# Patient Record
Sex: MALE | Race: WHITE | NOT HISPANIC OR LATINO | Employment: OTHER
[De-identification: names, ages, dates, MRNs, and addresses within clinical notes are randomized per-mention and may not be internally consistent; named-entity substitution may affect disease eponyms.]

---

## 2024-05-13 ENCOUNTER — OFFICE VISIT (OUTPATIENT)
Dept: MEDICAL GROUP | Facility: PHYSICIAN GROUP | Age: 69
End: 2024-05-13
Payer: MEDICARE

## 2024-05-13 VITALS
TEMPERATURE: 98.3 F | OXYGEN SATURATION: 96 % | SYSTOLIC BLOOD PRESSURE: 120 MMHG | HEIGHT: 68 IN | BODY MASS INDEX: 24.6 KG/M2 | HEART RATE: 65 BPM | DIASTOLIC BLOOD PRESSURE: 64 MMHG | WEIGHT: 162.3 LBS

## 2024-05-13 DIAGNOSIS — Z12.5 PROSTATE CANCER SCREENING: ICD-10-CM

## 2024-05-13 DIAGNOSIS — E78.5 DYSLIPIDEMIA: ICD-10-CM

## 2024-05-13 DIAGNOSIS — C85.90 NON-HODGKIN'S LYMPHOMA, UNSPECIFIED BODY REGION, UNSPECIFIED NON-HODGKIN LYMPHOMA TYPE (HCC): ICD-10-CM

## 2024-05-13 DIAGNOSIS — Z91.09 ENVIRONMENTAL ALLERGIES: ICD-10-CM

## 2024-05-13 DIAGNOSIS — H61.91 SKIN LESION OF RIGHT EAR: ICD-10-CM

## 2024-05-13 DIAGNOSIS — Z11.4 SCREENING FOR HIV (HUMAN IMMUNODEFICIENCY VIRUS): ICD-10-CM

## 2024-05-13 DIAGNOSIS — B00.2 ORAL HERPES: ICD-10-CM

## 2024-05-13 DIAGNOSIS — Z76.89 ENCOUNTER TO ESTABLISH CARE: ICD-10-CM

## 2024-05-13 DIAGNOSIS — I49.9 CARDIAC ARRHYTHMIA, UNSPECIFIED CARDIAC ARRHYTHMIA TYPE: ICD-10-CM

## 2024-05-13 DIAGNOSIS — Z87.442 HISTORY OF KIDNEY STONES: ICD-10-CM

## 2024-05-13 DIAGNOSIS — Z11.59 NEED FOR HEPATITIS C SCREENING TEST: ICD-10-CM

## 2024-05-13 PROCEDURE — 3078F DIAST BP <80 MM HG: CPT | Performed by: STUDENT IN AN ORGANIZED HEALTH CARE EDUCATION/TRAINING PROGRAM

## 2024-05-13 PROCEDURE — 99204 OFFICE O/P NEW MOD 45 MIN: CPT | Performed by: STUDENT IN AN ORGANIZED HEALTH CARE EDUCATION/TRAINING PROGRAM

## 2024-05-13 PROCEDURE — 3074F SYST BP LT 130 MM HG: CPT | Performed by: STUDENT IN AN ORGANIZED HEALTH CARE EDUCATION/TRAINING PROGRAM

## 2024-05-13 RX ORDER — ZOLPIDEM TARTRATE 10 MG/1
TABLET ORAL
COMMUNITY
Start: 2024-02-21 | End: 2024-05-13

## 2024-05-13 RX ORDER — ATORVASTATIN CALCIUM 10 MG/1
TABLET, FILM COATED ORAL
COMMUNITY
Start: 2024-03-18

## 2024-05-13 RX ORDER — ACYCLOVIR 800 MG/1
800 TABLET ORAL 2 TIMES DAILY
COMMUNITY

## 2024-05-13 RX ORDER — SCOLOPAMINE TRANSDERMAL SYSTEM 1 MG/1
PATCH, EXTENDED RELEASE TRANSDERMAL
COMMUNITY
Start: 2024-02-21

## 2024-05-13 RX ORDER — AZELASTINE HYDROCHLORIDE 137 UG/1
SPRAY, METERED NASAL
COMMUNITY
Start: 2024-03-04 | End: 2024-05-15 | Stop reason: SDUPTHER

## 2024-05-13 RX ORDER — MONTELUKAST SODIUM 10 MG/1
TABLET ORAL
COMMUNITY
Start: 2024-03-18

## 2024-05-13 ASSESSMENT — ENCOUNTER SYMPTOMS
NAUSEA: 0
DIZZINESS: 0
CHILLS: 0
COUGH: 0
HALLUCINATIONS: 0
WHEEZING: 0
SEIZURES: 0
BLOOD IN STOOL: 0
BRUISES/BLEEDS EASILY: 0
PALPITATIONS: 0
BLURRED VISION: 0
DOUBLE VISION: 0
ABDOMINAL PAIN: 0
HEMOPTYSIS: 0
SPUTUM PRODUCTION: 0
ORTHOPNEA: 0
HEADACHES: 0
DIARRHEA: 0
SHORTNESS OF BREATH: 0
FLANK PAIN: 0
SPEECH CHANGE: 0
DIAPHORESIS: 0
SENSORY CHANGE: 0
POLYDIPSIA: 0
DEPRESSION: 0
EYE DISCHARGE: 0
VOMITING: 0
CLAUDICATION: 0
TINGLING: 0
FALLS: 0
HEARTBURN: 0
MYALGIAS: 0
LOSS OF CONSCIOUSNESS: 0
WEIGHT LOSS: 0
WEAKNESS: 0
CONSTIPATION: 0
FOCAL WEAKNESS: 0
PND: 0
FEVER: 0
EYE PAIN: 0

## 2024-05-13 ASSESSMENT — PATIENT HEALTH QUESTIONNAIRE - PHQ9: CLINICAL INTERPRETATION OF PHQ2 SCORE: 0

## 2024-05-13 ASSESSMENT — LIFESTYLE VARIABLES: SUBSTANCE_ABUSE: 0

## 2024-05-13 NOTE — PATIENT INSTRUCTIONS
-Start taking baby aspirin; if you have any bleeding including black stools or blood stools, let me know.  Do labs (fasting) 1-2 weeks before next visit.   Placed referral to dermatology and oncology.  Get heart ultrasound done.

## 2024-05-13 NOTE — PROGRESS NOTES
CC:  Diagnoses of Non-Hodgkin's lymphoma, unspecified body region, unspecified non-Hodgkin lymphoma type (HCC), Cardiac arrhythmia, unspecified cardiac arrhythmia type, Prostate cancer screening, Dyslipidemia, Need for hepatitis C screening test, Screening for HIV (human immunodeficiency virus), History of kidney stones, Oral herpes, Environmental allergies, Skin lesion of right ear, and Encounter to establish care were pertinent to this visit.    HISTORY OF THE PRESENT ILLNESS: Patient is a 68 y.o. male. This pleasant patient is here today to establish care and discuss problems listed below.    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     Environmental Allergies    On singular as prevention, borderline asthma on pulmonary function      Skin Lesion of Right Ear    Had a precancerous lesion in R ear, s/p removal, pt request regular skin check by dermatology     Cardiac Arrhythmia    Had it twice over 4 years, just weird feeling of palpitation.  Noted twice on apple watch, concerning for afib.  Stress test was negative  before 4 years, due to tightness in chest.     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  -allegies, borderline asthma on pulmonary function   -s/p left hip arthroplasty, hand surgeries, appendectomy, lithotripsy    No family history of cancer  No history of cad or stroke  No melena or hematochezia    Health Maintenance: Completed    Live with . Was  to a woman before.  Moved from [place] to [place]. Has family here.  Received education in psychology and education. Worked in tech.  Likes to walk and hike.  Father had heart attack  age 56.    No past medical history on file.    Current Outpatient Medications   Medication Sig Dispense Refill    atorvastatin (LIPITOR) 10 MG Tab TAKE 1 TABLET BY MOUTH NIGHTLY      Azelastine (ASTELIN) 137 MCG/SPRAY Solution SPRAY TWO PUFFS INTO EACH NOSTRIL TWICE A DAY AS DIRECTED      montelukast (SINGULAIR) 10 MG Tab TAKE ONE TABLET BY MOUTH NIGHTLY      scopolamine (TRANSDERM-SCOP) 1 mg/72hr PATCH 72 HR APPLY ONE PATCH TO THE SKIN EVERY 72 HOURS ONTO THE SKIN. APPLY BEHIND THE EAR AT LEAST 4 HOURS PRIOR TO REQUIRED ANTIEMTIC EFFECT      acyclovir (ZOVIRAX) 800 MG Tab Take 800 mg by mouth 2 times a day.       No current facility-administered medications for this visit.       Allergies as of 05/13/2024    (Not on File)       Social History     Socioeconomic History    Marital status:      Spouse name: Not on file    Number of children: Not on file    Years of education: Not on file    Highest education level: Not on file   Occupational History    Not on file   Tobacco Use    Smoking status: Never    Smokeless tobacco: Never   Vaping Use    Vaping Use: Never used   Substance and Sexual Activity    Alcohol use: Yes     Comment: ocassional    Drug use: Never    Sexual activity: Not on file   Other Topics Concern    Not on file   Social History Narrative    Not on file     Social Determinants of Health     Financial Resource Strain: Not on file   Food Insecurity: Not on file   Transportation Needs: Not on file   Physical Activity: Not on file   Stress: Not on file   Social Connections: Not on file   Intimate Partner Violence: Not on file   Housing Stability: Not on file       Family History   Problem Relation Age of Onset    Heart Disease Father        No past surgical history on file.    ROS:   Review of Systems   Constitutional:  Negative for chills, diaphoresis, fever and weight loss.   HENT:  Negative for ear discharge, ear pain, hearing loss and tinnitus.    Eyes:  Negative for blurred vision, double  "vision, pain and discharge.   Respiratory:  Negative for cough, hemoptysis, sputum production, shortness of breath and wheezing.    Cardiovascular:  Negative for chest pain, palpitations, orthopnea, claudication, leg swelling and PND.   Gastrointestinal:  Negative for abdominal pain, blood in stool, constipation, diarrhea, heartburn, melena, nausea and vomiting.   Genitourinary:  Negative for dysuria, flank pain, hematuria and urgency.   Musculoskeletal:  Negative for falls and myalgias.   Skin:  Negative for itching and rash.   Neurological:  Negative for dizziness, tingling, sensory change, speech change, focal weakness, seizures, loss of consciousness, weakness and headaches.   Endo/Heme/Allergies:  Negative for polydipsia. Does not bruise/bleed easily.   Psychiatric/Behavioral:  Negative for depression, hallucinations, substance abuse and suicidal ideas.        /64 (BP Location: Left arm, Patient Position: Sitting, BP Cuff Size: Adult)   Pulse 65   Temp 36.8 °C (98.3 °F) (Temporal)   Ht 1.727 m (5' 8\")   Wt 73.6 kg (162 lb 4.8 oz)   SpO2 96%   BMI 24.68 kg/m² Body mass index is 24.68 kg/m².    Physical Exam  Constitutional:       Appearance: Normal appearance. He is not ill-appearing.   HENT:      Head: Normocephalic.      Right Ear: External ear normal.      Left Ear: External ear normal.      Nose: No rhinorrhea.      Mouth/Throat:      Pharynx: No oropharyngeal exudate.   Eyes:      General:         Right eye: No discharge.         Left eye: No discharge.      Extraocular Movements: Extraocular movements intact.   Cardiovascular:      Rate and Rhythm: Normal rate and regular rhythm.      Heart sounds: No murmur heard.  Pulmonary:      Effort: Pulmonary effort is normal. No respiratory distress.      Breath sounds: Normal breath sounds. No wheezing or rales.   Abdominal:      Palpations: Abdomen is soft.      Tenderness: There is no abdominal tenderness. There is no guarding.   Musculoskeletal:    "      General: No swelling or tenderness. Normal range of motion.      Cervical back: Normal range of motion and neck supple. No tenderness.      Right lower leg: No edema.      Left lower leg: No edema.   Skin:     Capillary Refill: Capillary refill takes less than 2 seconds.   Neurological:      General: No focal deficit present.      Mental Status: He is alert and oriented to person, place, and time.      Sensory: No sensory deficit.      Motor: No weakness.   Psychiatric:         Mood and Affect: Mood normal.         Behavior: Behavior normal.         Thought Content: Thought content normal.         Judgment: Judgment normal.             Note: I have reviewed all pertinent labs and diagnostic tests associated with this visit with specific comments listed under the assessment and plan below    Assessment and Plan  68 y.o. male with the following -  Established care with me. No acute issues. Hiv and hepc were ordered as discussed with patient. PSA ordered per patient preference.  Had EKG in clinic today and ordered echocardiogram, given history of arrhythmia    Rate: 61  Rhythm: sinus  Axis: normal to slightly left axis  WI interval: 176  QRS complex: 82  QT interval: 397  QTC interval:400  ST segment: No ST elevation or depression  T waves: T wave inversion in v1  Impression: unremarkable EKG without acute finding.  Plan:  proceed with echocardiogram.    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     Environmental Allergies    On singular as prevention, borderline asthma on pulmonary function      Skin Lesion of Right Ear    Had a precancerous lesion in R ear, s/p removal, pt request regular skin check by dermatology     Cardiac Arrhythmia    Had it twice over 4 years, just weird feeling of palpitation.  Noted twice  on apple watch, concerning for afib.  Stress test was negative  before 4 years, due to tightness in chest.     Dyslipidemia        1. Non-Hodgkin's lymphoma, unspecified body region, unspecified non-Hodgkin lymphoma type (HCC)  Referral to Hematology Oncology      2. Cardiac arrhythmia, unspecified cardiac arrhythmia type  EKG - Clinic Performed    EC-ECHOCARDIOGRAM COMPLETE W/O CONT    TSH WITH REFLEX TO FT4      3. Prostate cancer screening  PROSTATE SPECIFIC AG SCREENING      4. Dyslipidemia  CBC WITHOUT DIFFERENTIAL    Comp Metabolic Panel    Lipid Profile    TSH WITH REFLEX TO FT4      5. Need for hepatitis C screening test  HEP C VIRUS ANTIBODY      6. Screening for HIV (human immunodeficiency virus)  HIV AG/AB COMBO ASSAY SCREENING      7. History of kidney stones        8. Oral herpes        9. Environmental allergies        10. Skin lesion of right ear  Referral to Dermatology      11. Encounter to establish care             I spent a total of 29 minutes with record review, exam, communication with the patient, communication with other providers, and documentation of this encounter.    Return in about 4 weeks (around 6/10/2024) for wellness visit.    Please note that this dictation was created using voice recognition software. I have made every reasonable attempt to correct obvious errors, but I expect that there are errors of grammar and possibly content that I did not discover before finalizing the note.

## 2024-05-15 ENCOUNTER — PATIENT MESSAGE (OUTPATIENT)
Dept: MEDICAL GROUP | Facility: PHYSICIAN GROUP | Age: 69
End: 2024-05-15

## 2024-05-15 ENCOUNTER — HOSPITAL ENCOUNTER (OUTPATIENT)
Dept: CARDIOLOGY | Facility: MEDICAL CENTER | Age: 69
End: 2024-05-15
Attending: STUDENT IN AN ORGANIZED HEALTH CARE EDUCATION/TRAINING PROGRAM
Payer: MEDICARE

## 2024-05-15 DIAGNOSIS — I49.9 CARDIAC ARRHYTHMIA, UNSPECIFIED CARDIAC ARRHYTHMIA TYPE: ICD-10-CM

## 2024-05-15 LAB
LV EJECT FRACT  99904: 72
LV EJECT FRACT MOD 2C 99903: 71.81
LV EJECT FRACT MOD 4C 99902: 74.12
LV EJECT FRACT MOD BP 99901: 72.42

## 2024-05-15 PROCEDURE — 93306 TTE W/DOPPLER COMPLETE: CPT | Mod: 26 | Performed by: INTERNAL MEDICINE

## 2024-05-15 RX ORDER — AZELASTINE HYDROCHLORIDE 137 UG/1
SPRAY, METERED NASAL
Qty: 90 ML | Refills: 2 | Status: SHIPPED | OUTPATIENT
Start: 2024-05-15

## 2024-05-16 ENCOUNTER — OFFICE VISIT (OUTPATIENT)
Dept: DERMATOLOGY | Facility: IMAGING CENTER | Age: 69
End: 2024-05-16
Payer: MEDICARE

## 2024-05-16 DIAGNOSIS — L57.0 ACTINIC KERATOSIS: ICD-10-CM

## 2024-05-16 DIAGNOSIS — C85.90 NON-HODGKIN'S LYMPHOMA, UNSPECIFIED BODY REGION, UNSPECIFIED NON-HODGKIN LYMPHOMA TYPE (HCC): ICD-10-CM

## 2024-05-16 DIAGNOSIS — Z12.83 SKIN CANCER SCREENING: ICD-10-CM

## 2024-05-16 DIAGNOSIS — L90.8 SKIN AGING: ICD-10-CM

## 2024-05-16 DIAGNOSIS — L81.4 LENTIGO: ICD-10-CM

## 2024-05-16 DIAGNOSIS — L82.1 SEBORRHEIC KERATOSIS: ICD-10-CM

## 2024-05-16 DIAGNOSIS — D22.9 NEVUS: ICD-10-CM

## 2024-05-16 DIAGNOSIS — L82.0 INFLAMED SEBORRHEIC KERATOSIS: ICD-10-CM

## 2024-05-16 PROBLEM — I35.8 MILD AORTIC VALVE SCLEROSIS: Status: ACTIVE | Noted: 2024-05-16

## 2024-05-16 PROCEDURE — 17110 DESTRUCTION B9 LES UP TO 14: CPT | Performed by: DERMATOLOGY

## 2024-05-16 PROCEDURE — 99203 OFFICE O/P NEW LOW 30 MIN: CPT | Mod: 25 | Performed by: DERMATOLOGY

## 2024-05-16 PROCEDURE — 17000 DESTRUCT PREMALG LESION: CPT | Mod: 59 | Performed by: DERMATOLOGY

## 2024-05-16 NOTE — PROGRESS NOTES
"CC: Establish Care (ASHLEY)     Subjective: New patient here for ASHLEY.     HPI: Patient presents today for evaluation of multiple skin lesions to check for skin cancer.    History of skin cancer: No  History of precancers/actinic keratoses: Yes, Details: rt ear. Put some\"ointment on it for a while\" ( efudex maybe)   History of biopsies:Yes, Details: benign   History of blistering/severe sunburns:No  Family history of skin cancer:No  Family history of atypical moles:No    ROS: no fevers/chills. No itch.  No cough  Relevant PMH:NHL  Social: NS    PE: Gen:WDWN male in NAD. Skin: Scalp/face/eyes/lips/neck/chest/back/arms/legs/hands/feet/buttocks - without suspicious lesions noted.  Genitals exam declined  -thin flaking on pink base on right helix  -few thin waxy papules on right temple, right nares and right arm, appearing minimally irritated and benign  -scattered hyperpigmented macules/papules, appearing benign on torso and extremities    A/P: Nevi: benign appearing:  -Reviewed ABCDEs  -Reviewed skin cancer detection/prevention  -RTC PRN growth/changes/concerning features    Lentigos/SKs: benign  -reassurance  -reviewed skin cancer detection/prevention    ISK:    -LN2 25 sec X 2 cycles  X3 lesions  -f/u if persists prn    AKs: right ear  -counseled on diagnosis and treatment, including risks/benefits/alternatives of cyrotherapy  -LN2 25 sec X 2 cycles X 1lesions  -f/u if persists at 1 month      I have reviewed medications relevant to my specialty.          "

## 2024-05-30 ENCOUNTER — HOSPITAL ENCOUNTER (OUTPATIENT)
Dept: LAB | Facility: MEDICAL CENTER | Age: 69
End: 2024-05-30
Attending: STUDENT IN AN ORGANIZED HEALTH CARE EDUCATION/TRAINING PROGRAM
Payer: MEDICARE

## 2024-05-30 DIAGNOSIS — I49.9 CARDIAC ARRHYTHMIA, UNSPECIFIED CARDIAC ARRHYTHMIA TYPE: ICD-10-CM

## 2024-05-30 DIAGNOSIS — E78.5 DYSLIPIDEMIA: ICD-10-CM

## 2024-05-30 DIAGNOSIS — Z11.4 SCREENING FOR HIV (HUMAN IMMUNODEFICIENCY VIRUS): ICD-10-CM

## 2024-05-30 DIAGNOSIS — Z11.59 NEED FOR HEPATITIS C SCREENING TEST: ICD-10-CM

## 2024-05-30 DIAGNOSIS — Z12.5 PROSTATE CANCER SCREENING: ICD-10-CM

## 2024-05-30 LAB
ALBUMIN SERPL BCP-MCNC: 4.1 G/DL (ref 3.2–4.9)
ALBUMIN/GLOB SERPL: 1.4 G/DL
ALP SERPL-CCNC: 106 U/L (ref 30–99)
ALT SERPL-CCNC: 8 U/L (ref 2–50)
ANION GAP SERPL CALC-SCNC: 11 MMOL/L (ref 7–16)
AST SERPL-CCNC: 11 U/L (ref 12–45)
BILIRUB SERPL-MCNC: 0.6 MG/DL (ref 0.1–1.5)
BUN SERPL-MCNC: 14 MG/DL (ref 8–22)
CALCIUM ALBUM COR SERPL-MCNC: 9.4 MG/DL (ref 8.5–10.5)
CALCIUM SERPL-MCNC: 9.5 MG/DL (ref 8.5–10.5)
CHLORIDE SERPL-SCNC: 104 MMOL/L (ref 96–112)
CHOLEST SERPL-MCNC: 85 MG/DL (ref 100–199)
CO2 SERPL-SCNC: 26 MMOL/L (ref 20–33)
CREAT SERPL-MCNC: 0.85 MG/DL (ref 0.5–1.4)
ERYTHROCYTE [DISTWIDTH] IN BLOOD BY AUTOMATED COUNT: 41.3 FL (ref 35.9–50)
GFR SERPLBLD CREATININE-BSD FMLA CKD-EPI: 93 ML/MIN/1.73 M 2
GLOBULIN SER CALC-MCNC: 3 G/DL (ref 1.9–3.5)
GLUCOSE SERPL-MCNC: 95 MG/DL (ref 65–99)
HCT VFR BLD AUTO: 41.6 % (ref 42–52)
HCV AB SER QL: NORMAL
HDLC SERPL-MCNC: 39 MG/DL
HGB BLD-MCNC: 13.7 G/DL (ref 14–18)
HIV 1+2 AB+HIV1 P24 AG SERPL QL IA: NORMAL
LDLC SERPL CALC-MCNC: 36 MG/DL
MCH RBC QN AUTO: 30.6 PG (ref 27–33)
MCHC RBC AUTO-ENTMCNC: 32.9 G/DL (ref 32.3–36.5)
MCV RBC AUTO: 93.1 FL (ref 81.4–97.8)
PLATELET # BLD AUTO: 268 K/UL (ref 164–446)
PMV BLD AUTO: 8.9 FL (ref 9–12.9)
POTASSIUM SERPL-SCNC: 4.3 MMOL/L (ref 3.6–5.5)
PROT SERPL-MCNC: 7.1 G/DL (ref 6–8.2)
PSA SERPL-MCNC: 1.92 NG/ML (ref 0–4)
RBC # BLD AUTO: 4.47 M/UL (ref 4.7–6.1)
SODIUM SERPL-SCNC: 141 MMOL/L (ref 135–145)
TRIGL SERPL-MCNC: 50 MG/DL (ref 0–149)
TSH SERPL DL<=0.005 MIU/L-ACNC: 4.18 UIU/ML (ref 0.38–5.33)
WBC # BLD AUTO: 10.6 K/UL (ref 4.8–10.8)

## 2024-06-04 ENCOUNTER — TELEPHONE (OUTPATIENT)
Dept: HEALTH INFORMATION MANAGEMENT | Facility: OTHER | Age: 69
End: 2024-06-04
Payer: MEDICARE

## 2024-06-05 ENCOUNTER — HOSPITAL ENCOUNTER (OUTPATIENT)
Dept: HEMATOLOGY ONCOLOGY | Facility: MEDICAL CENTER | Age: 69
End: 2024-06-05
Attending: STUDENT IN AN ORGANIZED HEALTH CARE EDUCATION/TRAINING PROGRAM
Payer: MEDICARE

## 2024-06-05 VITALS
OXYGEN SATURATION: 98 % | TEMPERATURE: 97.2 F | HEART RATE: 68 BPM | HEIGHT: 68 IN | BODY MASS INDEX: 24.52 KG/M2 | RESPIRATION RATE: 16 BRPM | DIASTOLIC BLOOD PRESSURE: 58 MMHG | WEIGHT: 161.8 LBS | SYSTOLIC BLOOD PRESSURE: 104 MMHG

## 2024-06-05 DIAGNOSIS — C82.01 GRADE 1 FOLLICULAR LYMPHOMA OF LYMPH NODES OF NECK (HCC): ICD-10-CM

## 2024-06-05 PROCEDURE — 99212 OFFICE O/P EST SF 10 MIN: CPT | Performed by: STUDENT IN AN ORGANIZED HEALTH CARE EDUCATION/TRAINING PROGRAM

## 2024-06-05 PROCEDURE — 99203 OFFICE O/P NEW LOW 30 MIN: CPT | Performed by: STUDENT IN AN ORGANIZED HEALTH CARE EDUCATION/TRAINING PROGRAM

## 2024-06-05 ASSESSMENT — ENCOUNTER SYMPTOMS
INSOMNIA: 0
SHORTNESS OF BREATH: 0
NAUSEA: 0
CHILLS: 0
COUGH: 0
CONSTIPATION: 0
DIZZINESS: 0
VOMITING: 0
DOUBLE VISION: 0
WEAKNESS: 0
MYALGIAS: 0
DIAPHORESIS: 0
BRUISES/BLEEDS EASILY: 0
BLOOD IN STOOL: 0
BACK PAIN: 0
SINUS PAIN: 0
SORE THROAT: 0
HEARTBURN: 0
ABDOMINAL PAIN: 0
HEADACHES: 0
SPUTUM PRODUCTION: 0
ORTHOPNEA: 0
PALPITATIONS: 0
DIARRHEA: 0
WHEEZING: 0
WEIGHT LOSS: 0
FEVER: 0
TINGLING: 0
BLURRED VISION: 0
NERVOUS/ANXIOUS: 0

## 2024-06-05 ASSESSMENT — PAIN SCALES - GENERAL: PAINLEVEL: NO PAIN

## 2024-06-05 ASSESSMENT — FIBROSIS 4 INDEX: FIB4 SCORE: 1.02

## 2024-06-05 NOTE — ADDENDUM NOTE
Encounter addended by: Alexa Gonzales, Med Ass't on: 6/5/2024 10:31 AM   Actions taken: Charge Capture section accepted

## 2024-06-05 NOTE — PROGRESS NOTES
Consult:  Hematology/Oncology      Referring Physician: PCP  Primary Care:  Carlos Charles M.D.    Diagnosis: Follicular lymphoma    Chief Complaint:  Transfer of care, surveillance    History of Presenting Illness:  Jet Webb is a 68 y.o.  man who presents to the clinic for transfer of care for ongoing management for follicular lymphoma.  The patient was first diagnosed 5 and half years ago when he noticed a lymph node in his supraclavicular area on the right side, which has been enlarged for some time.  This was biopsied and found to be follicular lymphoma and he had continued follow-up with oncology in [place].  The patient never had any problems with B symptoms or endorgan damage, and was maintained on surveillance.  He has done well during this period of time and has recently retired and moved from [place] to [place].    Interval History:  Patient is here for consultation.  He states that he feels well and has no new complaints.    Past Medical History:   Diagnosis Date    Cancer (HCC)     Lymphoma (HCC)        Past Surgical History:   Procedure Laterality Date    APPENDECTOMY      ARTHROPLASTY         Social History     Socioeconomic History    Marital status:      Spouse name: Not on file    Number of children: Not on file    Years of education: Not on file    Highest education level: Not on file   Occupational History    Not on file   Tobacco Use    Smoking status: Never    Smokeless tobacco: Never   Vaping Use    Vaping status: Never Used   Substance and Sexual Activity    Alcohol use: Yes     Comment: ocassional    Drug use: Never    Sexual activity: Not on file   Other Topics Concern    Not on file   Social History Narrative     to his , recently moved from [place]. 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      Social Determinants of Health     Financial Resource Strain: Low Risk  (12/1/2023)    Received from [facility]    Overall Financial  Resource Strain (CARDIA)     Difficulty of Paying Living Expenses: Not hard at all   Food Insecurity: No Food Insecurity (12/1/2023)    Received from [facility]    Hunger Vital Sign     Worried About Running Out of Food in the Last Year: Never true     Ran Out of Food in the Last Year: Never true   Transportation Needs: No Transportation Needs (12/1/2023)    Received from [facility]    PRAPARE - Transportation     Lack of Transportation (Medical): No     Lack of Transportation (Non-Medical): No   Physical Activity: Sufficiently Active (12/1/2023)    Received from [facility]    Exercise Vital Sign     Days of Exercise per Week: 7 days     Minutes of Exercise per Session: 30 min   Stress: No Stress Concern Present (12/1/2023)    Received from [facility]    [place] [employer] - Occupational Stress Questionnaire     Feeling of Stress : Not at all   Social Connections: Moderately Integrated (12/1/2023)    Received from [facility]    Social Connection and Isolation Panel [NHANES]     Frequency of Communication with Friends and Family: More than three times a week     Frequency of Social Gatherings with Friends and Family: Three times a week     Attends Rastafarian Services: Never     Active Member of Clubs or Organizations: Yes     Attends Club or Organization Meetings: More than 4 times per year     Marital Status:    Intimate Partner Violence: Not At Risk (12/1/2023)    Received from [facility]    Humiliation, Afraid, Rape, and Kick questionnaire     Fear of Current or Ex-Partner: No     Emotionally Abused: No     Physically Abused: No     Sexually Abused: No   Housing Stability: Low Risk  (12/1/2023)    Received from [facility]    Housing Stability Vital Sign     Unable to Pay for Housing in the Last Year: No     Number of Places Lived in the Last Year: 1     Unstable Housing in the Last Year: No       Family History   Problem Relation Age of Onset    Heart Disease Father     Cancer  "Neg Hx        OB History   No obstetric history on file.       Allergies as of 06/05/2024    (No Known Allergies)         Current Outpatient Medications:     Azelastine (ASTELIN) 137 MCG/SPRAY Solution, SPRAY TWO PUFFS INTO EACH NOSTRIL TWICE A DAY AS DIRECTED, Disp: 90 mL, Rfl: 2    atorvastatin (LIPITOR) 10 MG Tab, TAKE 1 TABLET BY MOUTH NIGHTLY, Disp: , Rfl:     montelukast (SINGULAIR) 10 MG Tab, TAKE ONE TABLET BY MOUTH NIGHTLY, Disp: , Rfl:     scopolamine (TRANSDERM-SCOP) 1 mg/72hr PATCH 72 HR, APPLY ONE PATCH TO THE SKIN EVERY 72 HOURS ONTO THE SKIN. APPLY BEHIND THE EAR AT LEAST 4 HOURS PRIOR TO REQUIRED ANTIEMTIC EFFECT, Disp: , Rfl:     acyclovir (ZOVIRAX) 800 MG Tab, Take 800 mg by mouth 2 times a day., Disp: , Rfl:     Review of Systems:  Review of Systems   Constitutional:  Negative for chills, diaphoresis, fever, malaise/fatigue and weight loss.   HENT:  Negative for hearing loss, nosebleeds, sinus pain and sore throat.    Eyes:  Negative for blurred vision and double vision.   Respiratory:  Negative for cough, sputum production, shortness of breath and wheezing.    Cardiovascular:  Negative for chest pain, palpitations, orthopnea and leg swelling.   Gastrointestinal:  Negative for abdominal pain, blood in stool, constipation, diarrhea, heartburn, melena, nausea and vomiting.   Genitourinary:  Negative for dysuria, frequency, hematuria and urgency.   Musculoskeletal:  Negative for back pain, joint pain and myalgias.   Skin:  Negative for rash.   Neurological:  Negative for dizziness, tingling, weakness and headaches.   Endo/Heme/Allergies:  Does not bruise/bleed easily.   Psychiatric/Behavioral:  The patient is not nervous/anxious and does not have insomnia.           Physical Exam:  Vitals:    06/05/24 0833   BP: 104/58   Pulse: 68   Resp: 16   Temp: 36.2 °C (97.2 °F)   TempSrc: Temporal   SpO2: 98%   Weight: 73.4 kg (161 lb 12.8 oz)   Height: 1.727 m (5' 7.99\")       DESC; KARNOFSKY SCALE WITH ECOG " EQUIVALENT: 100, Fully active, able to carry on all pre-disease performed without restriction (ECOG equivalent 0)    DISTRESS LEVEL: no apparent distress    Physical Exam  Vitals and nursing note reviewed.   Constitutional:       General: He is awake. He is not in acute distress.     Appearance: Normal appearance. He is normal weight. He is not ill-appearing, toxic-appearing or diaphoretic.   HENT:      Head: Normocephalic and atraumatic.      Nose: Nose normal. No congestion.      Mouth/Throat:      Pharynx: Oropharynx is clear. No oropharyngeal exudate or posterior oropharyngeal erythema.   Eyes:      General: No scleral icterus.     Extraocular Movements: Extraocular movements intact.      Conjunctiva/sclera: Conjunctivae normal.      Pupils: Pupils are equal, round, and reactive to light.   Cardiovascular:      Rate and Rhythm: Normal rate and regular rhythm.      Pulses: Normal pulses.      Heart sounds: Normal heart sounds. No murmur heard.     No friction rub. No gallop.   Pulmonary:      Effort: Pulmonary effort is normal.      Breath sounds: Normal breath sounds. No decreased air movement. No wheezing, rhonchi or rales.   Abdominal:      General: Bowel sounds are normal. There is no distension.      Tenderness: There is no abdominal tenderness.   Musculoskeletal:         General: No deformity. Normal range of motion.      Cervical back: Normal range of motion and neck supple. No tenderness.      Right lower leg: No edema.      Left lower leg: No edema.   Lymphadenopathy:      Cervical: Cervical adenopathy present.      Left cervical: Superficial cervical adenopathy present.      Upper Body:      Right upper body: No axillary adenopathy.      Left upper body: No axillary adenopathy.      Lower Body: No right inguinal adenopathy. No left inguinal adenopathy.   Skin:     General: Skin is warm and dry.      Coloration: Skin is not jaundiced.      Findings: No erythema or rash.   Neurological:      General: No  focal deficit present.      Mental Status: He is alert and oriented to person, place, and time.      Sensory: Sensation is intact.      Motor: Motor function is intact. No weakness.      Gait: Gait is intact.   Psychiatric:         Attention and Perception: Attention normal.         Mood and Affect: Mood normal.         Behavior: Behavior normal. Behavior is cooperative.         Thought Content: Thought content normal.         Judgment: Judgment normal.          Depression Screening    Little interest or pleasure in doing things?      Feeling down, depressed , or hopeless?     Trouble falling or staying asleep, or sleeping too much?      Feeling tired or having little energy?      Poor appetite or overeating?      Feeling bad about yourself - or that you are a failure or have let yourself or your family down?     Trouble concentrating on things, such as reading the newspaper or watching television?     Moving or speaking so slowly that other people could have noticed.  Or the opposite - being so fidgety or restless that you have been moving around a lot more than usual?      Thoughts that you would be better off dead, or of hurting yourself?      Patient Health Questionnaire Score:         If depressive symptoms identified deferred to follow up visit unless specifically addressed in assesment and plan.    Interpretation of PHQ-9 Total Score   Score Severity   1-4 No Depression   5-9 Mild Depression   10-14 Moderate Depression   15-19 Moderately Severe Depression   20-27 Severe Depression    Labs:  Hospital Outpatient Visit on 05/30/2024   Component Date Value Ref Range Status    Prostatic Specific Antigen Tot 05/30/2024 1.92  0.00 - 4.00 ng/mL Final    Comment: Performed using Roche judson e immunoassay analyzer. tPSA values determined  on patient samples by different testing procedures cannot be directly  compared with one another and could be the cause of erroneous medical  interpretations. If there is a change in  the tPSA assay procedure used while  monitoring therapy, then new baselines may need to be established when  comparing previous results.      TSH 05/30/2024 4.180  0.380 - 5.330 uIU/mL Final    Comment: The 2011 American Thyroid Association (IONA) guidelines  recommended that the interpretation of thyroid function in  pregnancy be based on trimester specific reference ranges.    1st Trimester  0.100-2.500 mIU/L  2nd Trimester  0.200-3.000 mIU/L  3rd Trimester  0.300-3.500 mIU/L    These established reference ranges have not been validated  at [facility].      Cholesterol,Tot 05/30/2024 85 (L)  100 - 199 mg/dL Final    Triglycerides 05/30/2024 50  0 - 149 mg/dL Final    HDL 05/30/2024 39 (A)  >=40 mg/dL Final    LDL 05/30/2024 36  <100 mg/dL Final    Sodium 05/30/2024 141  135 - 145 mmol/L Final    Potassium 05/30/2024 4.3  3.6 - 5.5 mmol/L Final    Chloride 05/30/2024 104  96 - 112 mmol/L Final    Co2 05/30/2024 26  20 - 33 mmol/L Final    Anion Gap 05/30/2024 11.0  7.0 - 16.0 Final    Glucose 05/30/2024 95  65 - 99 mg/dL Final    Bun 05/30/2024 14  8 - 22 mg/dL Final    Creatinine 05/30/2024 0.85  0.50 - 1.40 mg/dL Final    Calcium 05/30/2024 9.5  8.5 - 10.5 mg/dL Final    Correct Calcium 05/30/2024 9.4  8.5 - 10.5 mg/dL Final    AST(SGOT) 05/30/2024 11 (L)  12 - 45 U/L Final    ALT(SGPT) 05/30/2024 8  2 - 50 U/L Final    Alkaline Phosphatase 05/30/2024 106 (H)  30 - 99 U/L Final    Total Bilirubin 05/30/2024 0.6  0.1 - 1.5 mg/dL Final    Albumin 05/30/2024 4.1  3.2 - 4.9 g/dL Final    Total Protein 05/30/2024 7.1  6.0 - 8.2 g/dL Final    Globulin 05/30/2024 3.0  1.9 - 3.5 g/dL Final    A-G Ratio 05/30/2024 1.4  g/dL Final    WBC 05/30/2024 10.6  4.8 - 10.8 K/uL Final    RBC 05/30/2024 4.47 (L)  4.70 - 6.10 M/uL Final    Hemoglobin 05/30/2024 13.7 (L)  14.0 - 18.0 g/dL Final    Hematocrit 05/30/2024 41.6 (L)  42.0 - 52.0 % Final    MCV 05/30/2024 93.1  81.4 - 97.8 fL Final    MCH 05/30/2024 30.6   27.0 - 33.0 pg Final    MCHC 05/30/2024 32.9  32.3 - 36.5 g/dL Final    RDW 05/30/2024 41.3  35.9 - 50.0 fL Final    Platelet Count 05/30/2024 268  164 - 446 K/uL Final    MPV 05/30/2024 8.9 (L)  9.0 - 12.9 fL Final    Hepatitis C Antibody 05/30/2024 Non-Reactive  Non-Reactive Final    Comment: Antibodies to HCV were not detected.  The Roche anti-HCV is a diagnostic test for the qualitative determination of  antibodies to the hepatitis C virus in human serum and plasma. The results  should be used and interpreted only in the context of the overall clinical  picture. A negative test result does not exclude the possibility of exposure  to hepatitis C virus.  Note: Assay performance characteristics have not been established in  populations of immunocompromised or immunosuppressed patients.      HIV Ag/Ab Combo Assay 05/30/2024 Non-Reactive  Non Reactive Final    Comment: Roche HIV is a diagnostic test for the qualitative determination of HIV-1  p24 antigen and antibodies to HIV-1 (HIV-1 groups M and O) and HIV-2 in  human serum and plasma. A negative screen does not preclude the possibility  of exposure or infection. Consider retesting in high-risk patients, if  clinically indicated.      GFR (CKD-EPI) 05/30/2024 93  >60 mL/min/1.73 m 2 Final    Comment: Estimated Glomerular Filtration Rate is calculated using  race neutral CKD-EPI 2021 equation per NKF-ASN recommendations.         Imaging:   All listed images below have been independently reviewed by me. I agree with the findings as summarized below:    EC-ECHOCARDIOGRAM COMPLETE W/O CONT    Result Date: 5/15/2024  Transthoracic Echo Report Echocardiography Laboratory CONCLUSIONS Normal left ventricular size, wall thickness, systolic, and diastolic function. The right ventricle is mildly dilated with preserved systolic function. Mildly dilated left atrium. Mild aortic insufficiency. No pericardial effusion. No prior study is available for comparison. NORBERTO NARANJO  Exam Date:         05/15/2024                    15:06 Exam Location:     Out Patient Priority:          Routine Ordering Physician:        CELIO SWANSON Referring Physician:       [id number] Mayo Sonographer:               Samson Calvin RDCS, RVT Age:    68     Gender:    M MRN:    [id number] :    1955 BSA:    1.87   Ht (in):    68     Wt (lb):    162 Exam Type:     Complete Indications:     Other specified cardiac arrhythmias ICD Codes:       I49.8 CPT Codes:       05502 BP:   120    /   64     HR:   66 Technical Quality:       Fair MEASUREMENTS  (Male / Female) Normal Values 2D ECHO LV Diastolic Diameter PLAX        4.5 cm                4.2 - 5.9 / 3.9 - 5.3 cm LV Systolic Diameter PLAX         2 cm                  2.1 - 4.0 cm IVS Diastolic Thickness           1.4 cm                LVPW Diastolic Thickness          0.88 cm               LVOT Diameter                     2.1 cm                Estimated LV Ejection Fraction    72 %                  LV Ejection Fraction MOD BP       72.4 %                >= 55  % LV Ejection Fraction MOD 4C       74.1 %                LV Ejection Fraction MOD 2C       71.8 %                IVC Diameter                      2.3 cm                DOPPLER AV Peak Velocity                  1.4 m/s               AV Peak Gradient                  7.9 mmHg              AV Mean Gradient                  4.4 mmHg              AI Pressure Half Time             599 ms                LVOT Peak Velocity                1.2 m/s               AV Area Cont Eq vti               3.1 cm2               MV Velocity Time Integral         26.6 cm               Mitral E Point Velocity           0.64 m/s              Mitral E to A Ratio               0.84                  Mitral A Duration                 118 ms                MV Pressure Half Time             76.2 ms               MV Area PHT                       2.9 cm2               MV Deceleration Time              263  ms                Pulmonary Vein Systolic Velocity  0.74 m/s              Pulmonary Vein Diastolic Velocit  0.46 m/s              Pulmonary Vein S/D Ratio          1.6                   Pulmonary Vein A Velocity         0.37 m/s              Pulmonary Vein A Duration         125 ms                Pulm Vein-MV A Duration           6.9 ms                TR Peak Velocity                  217 cm/s              PV Peak Velocity                  1.1 m/s               PV Peak Gradient                  5 mmHg                PV Mean Gradient                  2.4 mmHg              RVOT Peak Velocity                0.68 m/s              * Indicates values subject to auto-interpretation LV EF:  72    % FINDINGS Left Ventricle Normal left ventricular chamber size. Normal left ventricular wall thickness. Normal left ventricular systolic function. The ejection fraction is measured to be 72 % by Hernandez's biplane. No regional wall motion abnormalities. Normal diastolic function. Right Ventricle The right ventricle is mildly dilated. Normal right ventricular systolic function. Right Atrium Enlarged right atrium. Dilated inferior vena cava with inspiratory collapse. Left Atrium Mildly dilated left atrium. Left atrial volume index is 35 mL/sq m. Mitral Valve Thickened mitral valve leaflets. No mitral stenosis. Trace mitral regurgitation. Aortic Valve Aortic valve sclerosis without significant stenosis. Mild aortic insufficiency. Pressure half time is 614 msec. Tricuspid Valve The tricuspid valve is not well visualized. No tricuspid stenosis. Trace tricuspid regurgitation. Right atrial pressure is estimated to be 8 mmHg. Estimated right ventricular systolic pressure is 22 mmHg. Pulmonic Valve The pulmonic valve is not well visualized. No stenosis or regurgitation seen. Pericardium No pericardial effusion. Aorta The aortic root with a diameter of 4.1 cm. The ascending aorta diameter is 3.5 cm. Rk Ayala MD (Electronically Signed)  Final Date:     15 May 2024 18:14       Pathology:  Reported follicular lymphoma    Assessment & Plan:  1. 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 He has been on surveillance for many years and presents for transfer of care and management.     Current Diagnosis and Staging: Follicular lymphoma, stage II    Treatment Plan: Surveillance    Treatment Citation: NCCN    Plan of Care:    Primary Therapy: NA  Supportive Therapy: NA  Toxicity: NA  Labs: CBC with diff, CMP, LDH monitoring  Imaging: CT scans as clinically indicated  Treatment Planning: Patient has low grade indolent follicular lymphoma. Continue with surveillance with labs and exam q 6 months.   Consultations: NA  Code Status: Full  Miscellaneous: NA  Return for Follow Up: 6 months    Any questions and concerns raised by the patient were answered to the best of my ability. Thank you for allowing me to participate in the care for this patient. Please feel free to contact me for any questions or concerns.     Total time spent on chart review, clinic encounter, and documentation: 38 minutes.

## 2024-06-12 ENCOUNTER — OFFICE VISIT (OUTPATIENT)
Dept: MEDICAL GROUP | Facility: PHYSICIAN GROUP | Age: 69
End: 2024-06-12
Payer: MEDICARE

## 2024-06-12 VITALS
HEART RATE: 69 BPM | TEMPERATURE: 98.7 F | HEIGHT: 67 IN | DIASTOLIC BLOOD PRESSURE: 60 MMHG | OXYGEN SATURATION: 96 % | SYSTOLIC BLOOD PRESSURE: 118 MMHG | WEIGHT: 161.1 LBS | RESPIRATION RATE: 16 BRPM | BODY MASS INDEX: 25.28 KG/M2

## 2024-06-12 DIAGNOSIS — C82.01 GRADE 1 FOLLICULAR LYMPHOMA OF LYMPH NODES OF NECK (HCC): ICD-10-CM

## 2024-06-12 DIAGNOSIS — I49.9 CARDIAC ARRHYTHMIA, UNSPECIFIED CARDIAC ARRHYTHMIA TYPE: ICD-10-CM

## 2024-06-12 DIAGNOSIS — E78.5 DYSLIPIDEMIA: ICD-10-CM

## 2024-06-12 DIAGNOSIS — R74.8 ELEVATED ALKALINE PHOSPHATASE LEVEL: ICD-10-CM

## 2024-06-12 DIAGNOSIS — Z91.09 ENVIRONMENTAL ALLERGIES: ICD-10-CM

## 2024-06-12 DIAGNOSIS — D64.9 NORMOCYTIC ANEMIA: ICD-10-CM

## 2024-06-12 DIAGNOSIS — R09.89 THROAT CLEARING: ICD-10-CM

## 2024-06-12 PROCEDURE — 3074F SYST BP LT 130 MM HG: CPT | Performed by: STUDENT IN AN ORGANIZED HEALTH CARE EDUCATION/TRAINING PROGRAM

## 2024-06-12 PROCEDURE — 99214 OFFICE O/P EST MOD 30 MIN: CPT | Performed by: STUDENT IN AN ORGANIZED HEALTH CARE EDUCATION/TRAINING PROGRAM

## 2024-06-12 PROCEDURE — 3078F DIAST BP <80 MM HG: CPT | Performed by: STUDENT IN AN ORGANIZED HEALTH CARE EDUCATION/TRAINING PROGRAM

## 2024-06-12 RX ORDER — ACYCLOVIR 800 MG/1
800 TABLET ORAL 2 TIMES DAILY
Qty: 200 TABLET | Refills: 2 | Status: SHIPPED | OUTPATIENT
Start: 2024-06-12

## 2024-06-12 RX ORDER — ATORVASTATIN CALCIUM 10 MG/1
10 TABLET, FILM COATED ORAL NIGHTLY
Qty: 100 TABLET | Refills: 2 | Status: SHIPPED | OUTPATIENT
Start: 2024-06-12

## 2024-06-12 RX ORDER — MONTELUKAST SODIUM 10 MG/1
10 TABLET ORAL DAILY
Qty: 100 TABLET | Refills: 2 | Status: SHIPPED | OUTPATIENT
Start: 2024-06-12

## 2024-06-12 RX ORDER — AZELASTINE HYDROCHLORIDE 137 UG/1
SPRAY, METERED NASAL
Qty: 90 ML | Refills: 2 | Status: SHIPPED | OUTPATIENT
Start: 2024-06-12

## 2024-06-12 ASSESSMENT — ENCOUNTER SYMPTOMS
SPEECH CHANGE: 0
WEAKNESS: 0
EYE DISCHARGE: 0
POLYDIPSIA: 0
PND: 0
DIAPHORESIS: 0
HEARTBURN: 0
ABDOMINAL PAIN: 0
HEMOPTYSIS: 0
LOSS OF CONSCIOUSNESS: 0
DIZZINESS: 0
BRUISES/BLEEDS EASILY: 0
FLANK PAIN: 0
COUGH: 0
ORTHOPNEA: 0
NAUSEA: 0
SEIZURES: 0
MYALGIAS: 0
SENSORY CHANGE: 0
CHILLS: 0
PALPITATIONS: 0
EYE PAIN: 0
WHEEZING: 0
FEVER: 0
SPUTUM PRODUCTION: 0
SHORTNESS OF BREATH: 0
VOMITING: 0
FALLS: 0
FOCAL WEAKNESS: 0

## 2024-06-12 ASSESSMENT — FIBROSIS 4 INDEX: FIB4 SCORE: 1.02

## 2024-06-12 NOTE — PROGRESS NOTES
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    HISTORY OF THE PRESENT ILLNESS: Patient is a 68 y.o. male. This pleasant patient is here today for follow up visit.    Problem   Bmi 25.0-25.9,Adult    Patient states that his weight went down unintentionally a little bit over the years so I instructed him to work on maintaining muscles by potential lifting weights or doing resistance exercises     Throat Clearing    Chronic, has been going on for more than a year.  He states that it occurs after eating meals.  He denies bitter taste or heartburn but feels mucus in throat after eating meals.  Denies feeling of food getting stuck in throat or esophagus.  This may be related to environmental allergy but I ordered a swallow study to rule out reflux or stenosis.     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  Followed by oncology, , continuing surveillance     Environmental Allergies    On singulair as prevention, borderline asthma on pulmonary function   Allergy has been better.     Cardiac Arrhythmia    Had it twice over 4 years, just weird feeling of palpitation.  Noted twice on apple watch, concerning for afib.  Stress test was negative  before 4 years, due to tightness in chest.  Echocardiogram May 2024, showed mildly dilated atrium, so concern for rare afib or svt/ectopy, we will keep a close eye on it. Instructed to keep wearing apple watch and keep an  eye on the rate and rhythm, due to very rare frequency patient does not qualify for wearing Holter or Zio patch; due to very rare frequency patient does not need anticoagulation anyway.     Dyslipidemia    Chronic, LDL 36  Hdl slightly low at 39, so encouraged exercise.  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  -allegies, borderline asthma on pulmonary function   -s/p left hip arthroplasty, hand surgeries, appendectomy, lithotripsy    No family history of cancer  No history of cad or stroke  No melena or hematochezia    Health Maintenance: Completed    Live with . Was  to a woman before.  Moved from [place] to [place]. Has family here.  Received education in psychology and education. Worked in tech.  Likes to walk and hike.  Father had heart attack age 56.    Past Medical History:   Diagnosis Date    Cancer (HCC)     Lymphoma (HCC)        Current Outpatient Medications   Medication Sig Dispense Refill    acyclovir (ZOVIRAX) 800 MG Tab Take 1 Tablet by mouth 2 times a day. 200 Tablet 2    atorvastatin (LIPITOR) 10 MG Tab Take 1 Tablet by mouth every evening. 100 Tablet 2    Azelastine (ASTELIN) 137 MCG/SPRAY Solution SPRAY TWO PUFFS INTO EACH NOSTRIL TWICE A DAY AS DIRECTED 90 mL 2    montelukast (SINGULAIR) 10 MG Tab Take 1 Tablet by mouth every day. 100 Tablet 2    scopolamine (TRANSDERM-SCOP) 1 mg/72hr PATCH 72 HR APPLY ONE PATCH TO THE SKIN EVERY 72 HOURS ONTO THE SKIN. APPLY BEHIND THE EAR AT LEAST 4 HOURS PRIOR TO REQUIRED ANTIEMTIC EFFECT       No current facility-administered medications for this visit.       Allergies as of 06/12/2024    (No Known Allergies)       Social History      Socioeconomic History    Marital status:      Spouse name: Not on file    Number of children: Not on file    Years of education: Not on file    Highest education level: Not on file   Occupational History    Not on file   Tobacco Use    Smoking status: Never    Smokeless tobacco: Never   Vaping Use    Vaping status: Never Used   Substance and Sexual Activity    Alcohol use: Yes     Comment: ocassional    Drug use: Never    Sexual activity: Not on file   Other Topics Concern    Not on file   Social History Narrative     to his , recently moved from [place]. 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      Social Determinants of Health     Financial Resource Strain: Low Risk  (12/1/2023)    Received from [facility]    Overall Financial Resource Strain (CARDIA)     Difficulty of Paying Living Expenses: Not hard at all   Food Insecurity: No Food Insecurity (12/1/2023)    Received from [facility]    Hunger Vital Sign     Worried About Running Out of Food in the Last Year: Never true     Ran Out of Food in the Last Year: Never true   Transportation Needs: No Transportation Needs (12/1/2023)    Received from [facility]    PRAPARE - Transportation     Lack of Transportation (Medical): No     Lack of Transportation (Non-Medical): No   Physical Activity: Sufficiently Active (12/1/2023)    Received from [facility]    Exercise Vital Sign     Days of Exercise per Week: 7 days     Minutes of Exercise per Session: 30 min   Stress: No Stress Concern Present (12/1/2023)    Received from [facility]    [facility] [place] [facility] [employer] - Occupational Stress Questionnaire     Feeling of Stress : Not at all   Social Connections: Moderately Integrated (12/1/2023)    Received from [facility]    Social Connection and Isolation Panel [NHANES]     Frequency of Communication with Friends and Family: More than three times a week     Frequency of Social Gatherings with Friends and Family:  "Three times a week     Attends Mosque Services: Never     Active Member of Clubs or Organizations: Yes     Attends Club or Organization Meetings: More than 4 times per year     Marital Status:    Intimate Partner Violence: Not At Risk (12/1/2023)    Received from [facility]    Humiliation, Afraid, Rape, and Kick questionnaire     Fear of Current or Ex-Partner: No     Emotionally Abused: No     Physically Abused: No     Sexually Abused: No   Housing Stability: Low Risk  (12/1/2023)    Received from [facility]    Housing Stability Vital Sign     Unable to Pay for Housing in the Last Year: No     Number of Places Lived in the Last Year: 1     Unstable Housing in the Last Year: No       Family History   Problem Relation Age of Onset    Heart Disease Father     Cancer Neg Hx        Past Surgical History:   Procedure Laterality Date    APPENDECTOMY      ARTHROPLASTY         ROS:   Review of Systems   Constitutional:  Negative for chills, diaphoresis and fever.   HENT:  Negative for ear discharge and ear pain.    Eyes:  Negative for pain and discharge.   Respiratory:  Negative for cough, hemoptysis, sputum production, shortness of breath and wheezing.    Cardiovascular:  Negative for chest pain, palpitations, orthopnea, leg swelling and PND.   Gastrointestinal:  Negative for abdominal pain, heartburn, nausea and vomiting.   Genitourinary:  Negative for dysuria, flank pain, hematuria and urgency.   Musculoskeletal:  Negative for falls and myalgias.   Neurological:  Negative for dizziness, sensory change, speech change, focal weakness, seizures, loss of consciousness and weakness.   Endo/Heme/Allergies:  Positive for environmental allergies. Negative for polydipsia. Does not bruise/bleed easily.       /60 (BP Location: Left arm, Patient Position: Sitting)   Pulse 69   Temp 37.1 °C (98.7 °F) (Temporal)   Resp 16   Ht 1.702 m (5' 7\")   Wt 73.1 kg (161 lb 1.6 oz)   SpO2 96%   BMI 25.23 kg/m² Body mass " index is 25.23 kg/m².    Physical Exam  Constitutional:       Appearance: Normal appearance. He is not ill-appearing.   HENT:      Head: Normocephalic.      Right Ear: External ear normal.      Left Ear: External ear normal.      Nose: No rhinorrhea.      Mouth/Throat:      Pharynx: No oropharyngeal exudate.   Eyes:      General:         Right eye: No discharge.         Left eye: No discharge.      Extraocular Movements: Extraocular movements intact.   Cardiovascular:      Rate and Rhythm: Normal rate and regular rhythm.      Heart sounds: No murmur heard.  Pulmonary:      Effort: Pulmonary effort is normal. No respiratory distress.      Breath sounds: Normal breath sounds. No wheezing or rales.   Abdominal:      Palpations: Abdomen is soft.      Tenderness: There is no abdominal tenderness. There is no guarding.   Musculoskeletal:         General: No swelling or tenderness. Normal range of motion.      Cervical back: Normal range of motion and neck supple. No tenderness.      Right lower leg: No edema.      Left lower leg: No edema.   Skin:     Capillary Refill: Capillary refill takes less than 2 seconds.   Neurological:      General: No focal deficit present.      Mental Status: He is alert and oriented to person, place, and time.      Sensory: No sensory deficit.      Motor: No weakness.   Psychiatric:         Mood and Affect: Mood normal.         Behavior: Behavior normal.         Thought Content: Thought content normal.         Judgment: Judgment normal.         Lab Results   Component Value Date/Time    CHOLSTRLTOT 85 (L) 05/30/2024 08:15 AM    LDL 36 05/30/2024 08:15 AM    HDL 39 (A) 05/30/2024 08:15 AM    TRIGLYCERIDE 50 05/30/2024 08:15 AM       Lab Results   Component Value Date/Time    SODIUM 141 05/30/2024 08:15 AM    POTASSIUM 4.3 05/30/2024 08:15 AM    CHLORIDE 104 05/30/2024 08:15 AM    CO2 26 05/30/2024 08:15 AM    GLUCOSE 95 05/30/2024 08:15 AM    BUN 14 05/30/2024 08:15 AM    CREATININE 0.85  05/30/2024 08:15 AM     Lab Results   Component Value Date/Time    ALKPHOSPHAT 106 (H) 05/30/2024 08:15 AM    ASTSGOT 11 (L) 05/30/2024 08:15 AM    ALTSGPT 8 05/30/2024 08:15 AM    TBILIRUBIN 0.6 05/30/2024 08:15 AM       Lab Results   Component Value Date/Time    HBA1C 5.8 01/08/2019 06:00 AM        Note: I have reviewed all pertinent labs and diagnostic tests associated with this visit with specific comments listed under the assessment and plan below    Assessment and Plan  68 y.o. male with the following -    Problem   Bmi 25.0-25.9,Adult    Patient states that his weight went down unintentionally a little bit over the years so I instructed him to work on maintaining muscles by potential lifting weights or doing resistance exercises     Throat Clearing    Chronic, has been going on for more than a year.  He states that it occurs after eating meals.  He denies bitter taste or heartburn but feels mucus in throat after eating meals.  Denies feeling of food getting stuck in throat or esophagus.  This may be related to environmental allergy but I ordered a swallow study to rule out reflux or stenosis.     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  Followed by oncology, , continuing surveillance     Environmental Allergies    On singulair as prevention, borderline asthma on pulmonary function   Allergy has been better.     Cardiac Arrhythmia    Had it twice over 4 years, just weird feeling of palpitation.  Noted twice on apple watch, concerning for afib.  Stress test was negative  before 4 years, due to tightness in chest.  Echocardiogram May 2024, showed mildly dilated atrium, so concern for rare afib or  svt/ectopy, we will keep a close eye on it. Instructed to keep wearing apple watch and keep an eye on the rate and rhythm, due to very rare frequency patient does not qualify for wearing Holter or Zio patch; due to very rare frequency patient does not need anticoagulation anyway.     Dyslipidemia    Chronic, LDL 36  Hdl slightly low at 39, so encouraged exercise.  Current regimen: Lipitor 10 mg nightly          1. Throat clearing  DX-ESOPHAGUS BARIUM SWALLOW SINGLE CONTRAST    Chronic, stable      2. Environmental allergies      Chronic, stable      3. Elevated alkaline phosphatase level  ALKALINE PHOSPHATASE ISOENZYMES    VITAMIN D,25 HYDROXY (DEFICIENCY)    Chronic, stable      4. Dyslipidemia      Chronic, stable      5. BMI 25.0-25.9,adult      Chronic, stable      6. 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 Cardiac arrhythmia, unspecified cardiac arrhythmia type      Chronic, stable      8. Normocytic anemia      Chronic, stable           I spent a total of 33 minutes with record review, exam, communication with the patient, communication with other providers, and documentation of this encounter.    Return in about 6 months (around 12/12/2024).    Please note that this dictation was created using voice recognition software. I have made every reasonable attempt to correct obvious errors, but I expect that there are errors of grammar and possibly content that I did not discover before finalizing the note.

## 2024-07-15 ASSESSMENT — PATIENT HEALTH QUESTIONNAIRE - PHQ9
2. FEELING DOWN, DEPRESSED, IRRITABLE, OR HOPELESS: NOT AT ALL
1. LITTLE INTEREST OR PLEASURE IN DOING THINGS: NOT AT ALL

## 2024-07-15 ASSESSMENT — ENCOUNTER SYMPTOMS: GENERAL WELL-BEING: EXCELLENT

## 2024-07-15 ASSESSMENT — ACTIVITIES OF DAILY LIVING (ADL): BATHING_REQUIRES_ASSISTANCE: 0

## 2024-07-17 ENCOUNTER — HOSPITAL ENCOUNTER (OUTPATIENT)
Dept: RADIOLOGY | Facility: MEDICAL CENTER | Age: 69
End: 2024-07-17
Attending: STUDENT IN AN ORGANIZED HEALTH CARE EDUCATION/TRAINING PROGRAM
Payer: MEDICARE

## 2024-07-17 DIAGNOSIS — R09.89 THROAT CLEARING: ICD-10-CM

## 2024-07-17 PROCEDURE — 74220 X-RAY XM ESOPHAGUS 1CNTRST: CPT

## 2024-07-18 ENCOUNTER — OFFICE VISIT (OUTPATIENT)
Dept: FAMILY PLANNING/WOMEN'S HEALTH CLINIC | Facility: PHYSICIAN GROUP | Age: 69
End: 2024-07-18
Payer: MEDICARE

## 2024-07-18 VITALS
BODY MASS INDEX: 24.14 KG/M2 | SYSTOLIC BLOOD PRESSURE: 120 MMHG | HEIGHT: 69 IN | WEIGHT: 163 LBS | DIASTOLIC BLOOD PRESSURE: 64 MMHG

## 2024-07-18 DIAGNOSIS — C82.01 GRADE 1 FOLLICULAR LYMPHOMA OF LYMPH NODES OF NECK (HCC): ICD-10-CM

## 2024-07-18 DIAGNOSIS — E78.5 DYSLIPIDEMIA: ICD-10-CM

## 2024-07-18 PROCEDURE — G0439 PPPS, SUBSEQ VISIT: HCPCS

## 2024-07-18 PROCEDURE — 1126F AMNT PAIN NOTED NONE PRSNT: CPT

## 2024-07-18 SDOH — ECONOMIC STABILITY: TRANSPORTATION INSECURITY
IN THE PAST 12 MONTHS, HAS LACK OF TRANSPORTATION KEPT YOU FROM MEETINGS, WORK, OR FROM GETTING THINGS NEEDED FOR DAILY LIVING?: NO

## 2024-07-18 SDOH — ECONOMIC STABILITY: INCOME INSECURITY: IN THE LAST 12 MONTHS, WAS THERE A TIME WHEN YOU WERE NOT ABLE TO PAY THE MORTGAGE OR RENT ON TIME?: NO

## 2024-07-18 SDOH — ECONOMIC STABILITY: HOUSING INSECURITY: IN THE LAST 12 MONTHS, HOW MANY PLACES HAVE YOU LIVED?: 1

## 2024-07-18 SDOH — ECONOMIC STABILITY: FOOD INSECURITY: WITHIN THE PAST 12 MONTHS, YOU WORRIED THAT YOUR FOOD WOULD RUN OUT BEFORE YOU GOT MONEY TO BUY MORE.: NEVER TRUE

## 2024-07-18 SDOH — ECONOMIC STABILITY: FOOD INSECURITY: WITHIN THE PAST 12 MONTHS, THE FOOD YOU BOUGHT JUST DIDN'T LAST AND YOU DIDN'T HAVE MONEY TO GET MORE.: NEVER TRUE

## 2024-07-18 SDOH — ECONOMIC STABILITY: HOUSING INSECURITY
IN THE LAST 12 MONTHS, WAS THERE A TIME WHEN YOU DID NOT HAVE A STEADY PLACE TO SLEEP OR SLEPT IN A SHELTER (INCLUDING NOW)?: NO

## 2024-07-18 SDOH — ECONOMIC STABILITY: INCOME INSECURITY: HOW HARD IS IT FOR YOU TO PAY FOR THE VERY BASICS LIKE FOOD, HOUSING, MEDICAL CARE, AND HEATING?: NOT HARD AT ALL

## 2024-07-18 SDOH — ECONOMIC STABILITY: TRANSPORTATION INSECURITY
IN THE PAST 12 MONTHS, HAS THE LACK OF TRANSPORTATION KEPT YOU FROM MEDICAL APPOINTMENTS OR FROM GETTING MEDICATIONS?: NO

## 2024-07-18 ASSESSMENT — FIBROSIS 4 INDEX: FIB4 SCORE: 1.02

## 2024-07-18 ASSESSMENT — PAIN SCALES - GENERAL: PAINLEVEL: NO PAIN

## 2024-07-18 ASSESSMENT — PATIENT HEALTH QUESTIONNAIRE - PHQ9: CLINICAL INTERPRETATION OF PHQ2 SCORE: 0

## 2024-08-31 ENCOUNTER — OFFICE VISIT (OUTPATIENT)
Dept: URGENT CARE | Facility: CLINIC | Age: 69
End: 2024-08-31
Payer: MEDICARE

## 2024-08-31 VITALS
SYSTOLIC BLOOD PRESSURE: 138 MMHG | HEART RATE: 74 BPM | WEIGHT: 161.2 LBS | OXYGEN SATURATION: 98 % | HEIGHT: 68 IN | RESPIRATION RATE: 14 BRPM | DIASTOLIC BLOOD PRESSURE: 64 MMHG | BODY MASS INDEX: 24.43 KG/M2 | TEMPERATURE: 97.9 F

## 2024-08-31 DIAGNOSIS — J01.90 ACUTE BACTERIAL SINUSITIS: Primary | ICD-10-CM

## 2024-08-31 DIAGNOSIS — B96.89 ACUTE BACTERIAL SINUSITIS: Primary | ICD-10-CM

## 2024-08-31 DIAGNOSIS — H10.9 BACTERIAL CONJUNCTIVITIS OF LEFT EYE: ICD-10-CM

## 2024-08-31 RX ORDER — POLYMYXIN B SULFATE AND TRIMETHOPRIM 1; 10000 MG/ML; [USP'U]/ML
1 SOLUTION OPHTHALMIC EVERY 4 HOURS
Qty: 4 ML | Refills: 0 | Status: SHIPPED | OUTPATIENT
Start: 2024-08-31 | End: 2024-09-10

## 2024-08-31 ASSESSMENT — ENCOUNTER SYMPTOMS
SORE THROAT: 0
FEVER: 0
CHILLS: 1
EYE REDNESS: 1
VOMITING: 0
DOUBLE VISION: 0
WHEEZING: 0
PALPITATIONS: 0
HEARTBURN: 0
NAUSEA: 0
MYALGIAS: 0
SINUS PAIN: 1
BLURRED VISION: 0
EYE PAIN: 0
COUGH: 1
STRIDOR: 0
SPUTUM PRODUCTION: 1
DEPRESSION: 0
EYE DISCHARGE: 1
PHOTOPHOBIA: 0
SHORTNESS OF BREATH: 0
DIZZINESS: 0
HEMOPTYSIS: 0
HEADACHES: 1

## 2024-08-31 ASSESSMENT — VISUAL ACUITY
OD_CC: 20/25
OS_CC: 20/25

## 2024-08-31 ASSESSMENT — FIBROSIS 4 INDEX: FIB4 SCORE: 1.02

## 2024-08-31 NOTE — PROGRESS NOTES
Subjective:   eJt Webb is a 68 y.o. male who presents for Eye Problem (My left eye likely has an infection as it is has discharge and is congested )          I introduced myself to the patient and informed them that I am a Family Nurse Practitioner.    HPI: Jet is a 68-year-old male who comes in today c/o 1. who comes in today with c/o sinus pain and pressure, thick green nasal drainage, tactile fever, headache, malaise. Onset was over a week ago.  Patient describes symptoms as constant. They describe the pain as headache, aching and pressure in the area of the sinuses. Aggravating factors include leaning forward, blowing their nose. Relieving factors include none. Treatments tried at home include  Advil , Astelin nasal spray with poor relief . They describe their symptoms as moderate.  Patient states that they have had several bacterial sinus infections in the past and this appears consistent.  2.  L eye is red, irritated, thick yellow mucopurulent discharge this morning and crusting of eyelashes. Onset was last evening, woke up with it this morning.  Patient describes symptoms as constant. They describe the pain as none. Aggravating factors include rubbing the eye. Relieving factors include bathing the eye. Treatments tried at home include none. They describe their symptoms as moderate.  Denies anybody else at home has similar symptoms, denies any contact with anyone with pinkeye. They deny wearing contact lenses. Most recent labs of 05/30/2024 shows good renal and hepatic function      Review of Systems   Constitutional:  Positive for chills and malaise/fatigue. Negative for fever.   HENT:  Positive for congestion and sinus pain. Negative for ear discharge, ear pain, hearing loss and sore throat.    Eyes:  Positive for discharge and redness. Negative for blurred vision, double vision, photophobia and pain.   Respiratory:  Positive for cough and sputum production. Negative for hemoptysis, shortness  "of breath, wheezing and stridor.    Cardiovascular:  Negative for chest pain and palpitations.   Gastrointestinal:  Negative for heartburn, nausea and vomiting.   Genitourinary:  Negative for dysuria.   Musculoskeletal:  Negative for myalgias.   Skin:  Negative for rash.   Neurological:  Positive for headaches. Negative for dizziness.   Psychiatric/Behavioral:  Negative for depression.    All other systems reviewed and are negative.      Medications: acyclovir Tabs  atorvastatin Tabs  Azelastine Soln  montelukast Tabs  scopolamine Pt72     Allergies: Patient has no known allergies.    Problem List: does not have any pertinent problems on file.    Surgical History:  Past Surgical History:   Procedure Laterality Date    APPENDECTOMY      ARTHROPLASTY         Past Social Hx:   reports that he has never smoked. He has never used smokeless tobacco. He reports current alcohol use. He reports that he does not use drugs.     Past Family Hx:   family history includes Heart Disease in his father.     Problem list, medications, and allergies reviewed by myself today in Epic.   I have documented what I find to be significant in regards to past medical, social, family and surgical history  in my HPI or under PMH/PSH/FH review section, otherwise it is noncontributory     Objective:     /64 (BP Location: Left arm, Patient Position: Sitting)   Pulse 74   Temp 36.6 °C (97.9 °F) (Temporal)   Resp 14   Ht 1.727 m (5' 8\")   Wt 73.1 kg (161 lb 3.2 oz)   SpO2 98%   BMI 24.51 kg/m²     During this visit, appropriate PPE was worn, and hand hygiene was performed.    Physical Exam  Vitals reviewed.   Constitutional:       General: He is not in acute distress.     Appearance: Normal appearance. He is not ill-appearing, toxic-appearing or diaphoretic.   HENT:      Head: Normocephalic and atraumatic.      Right Ear: Tympanic membrane, ear canal and external ear normal. There is no impacted cerumen.      Left Ear: Tympanic membrane, " ear canal and external ear normal. There is no impacted cerumen.      Nose: Congestion and rhinorrhea present. Rhinorrhea is purulent.      Right Turbinates: Swollen.      Left Turbinates: Swollen.      Right Sinus: Maxillary sinus tenderness and frontal sinus tenderness present.      Left Sinus: Maxillary sinus tenderness and frontal sinus tenderness present.      Mouth/Throat:      Mouth: Mucous membranes are moist.      Pharynx: No oropharyngeal exudate or posterior oropharyngeal erythema.   Eyes:      General: No scleral icterus.        Right eye: No discharge.         Left eye: Discharge present.     Extraocular Movements: Extraocular movements intact.      Conjunctiva/sclera: Conjunctivae normal.      Pupils: Pupils are equal, round, and reactive to light.      Comments: L eye exam shows no penetrative injury or foreign object noted.  There is injection and erythema of the conjunctivae, signs of thick yellow mucopurulent drainage and crusting of the eyelashes present.  No signs of uveitis, hyphema, proptosis, or chemosis.  EOM intact without pain, no periorbital swelling or cellulitis.  Visual acuity is grossly intact. CN II - IV and CN VI grossly intact by visual exam. 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       Cardiovascular:      Rate and Rhythm: Normal rate and regular rhythm.      Heart sounds: Normal heart sounds.   Pulmonary:      Effort: No respiratory distress.      Breath sounds: Normal breath sounds. No stridor. No wheezing, rhonchi or rales.   Chest:      Chest wall: No tenderness.   Abdominal:      General: There is no distension.      Palpations: Abdomen is soft.   Genitourinary:     Comments:  Deferred  Musculoskeletal:         General: Normal range of motion.      Cervical back: Normal range of motion. No rigidity or tenderness.   Lymphadenopathy:      Cervical: No cervical adenopathy.   Skin:     General: Skin is warm and dry.   Neurological:      General: No focal deficit present.      Mental Status: He is alert and oriented to person, place, and time. Mental status is at baseline.   Psychiatric:         Mood and Affect: Mood normal.         Behavior: Behavior normal.         Assessment/Plan:     Diagnosis and associated orders:     1. Acute bacterial sinusitis  amoxicillin-clavulanate (AUGMENTIN) 875-125 MG Tab      2. Bacterial conjunctivitis of left eye  amoxicillin-clavulanate (AUGMENTIN) 875-125 MG Tab    polymixin-trimethoprim (POLYTRIM) 53860-1.1 UNIT/ML-% Solution         Comments/MDM:     1. Acute bacterial sinusitis  Patient meets Infectious Disease Society of [place] (IDSA)  guidelines for ABRS given duration of symptoms, worsening severity, clinical history and physical exam   We discussed management of sinus infection including -  - supportive care measures such as drinking plenty of fluids, use a humidifier in room at night to keep mucous membranes moist, applying warm compresses to face and forehead may be useful in relieving sinus pain and pressure, using a sinus wash such as the NeilMed Neti bottle several times a day as needed, Flonase daily, OTC second-generation non-sedating antihistamine such as Zyrtec, Allegra, or Loratadine daily, alternate Tylenol with ibuprofen (unless contraindicated) for pain and fever.  OTC decongestant of choice. Follow manufacturers guidelines for dosing and safety precautions.   -We did discuss red flags and reasons to return to urgent care versus when to go to the ER.    Discussed DDx, management options (risks,benefits, and alternatives to planned treatment), natural progression.  Questions were encouraged and answered. Written information was provided  and I did go over this with the patient in clinic today.  Instructed patient regarding red flags and to return to urgent care prn if new or worsening sx or if there is no improvement in condition prn.    Advised the patient to follow-up with the primary care physician for recheck, reevaluation, and consideration of further management.  I did instruct patient regarding medications prescribed, purpose, side effects, cautions.  Instructed patient to get a pharmacy consult when picking up any prescribed medications.  Strict ER precautions discussed for any mastoid pain, swelling of the face or around the eyes, visual disturbances, eye pain, chest pain, difficulty breathing, difficulty swallowing, wheezing, stridor, or drooling, fever that does not respond to ibuprofen and Tylenol, inability to tolerate fluids, dehydration, especially in the setting of fever, chills, nausea or vomiting, lethargy.     Patient states they have good understanding and they are agreeable with the plan of care.    - amoxicillin-clavulanate (AUGMENTIN) 875-125 MG Tab; Take 1 Tablet by mouth 2 times a day for 7 days.  Dispense: 14 Tablet; Refill: 0    2. Bacterial conjunctivitis of left eye    Discussed with patient that conjunctivitis can be bacterial, viral or allergic, and treatment approach is different for bacterial versus viral or allergic.  Discussed that his presentation and symptoms are consistent with bacterial conjunctivitis and this is treated with antibiotic drops.   To ease discomfort, apply a cool, clean wash cloth to your eye for 10 to 20 minutes, 3 to 4 times a day.  Gently wipe away any drainage from the eye with a warm, wet washcloth or a cotton ball.  Wash your hands often with soap and use paper towels to dry.  Do not share towels or washcloths. This may spread the infection.  Change or wash your pillowcase every day.  You should not use eye make-up until the infection is gone.  Stop using contacts lenses. Ask your eye  professional how to sterilize or replace them before using again. This depends on the type of contact lenses used.  Do not touch the edge of the eyelid with the eye drop bottle or ointment tube when applying medications to the affected eye. This will stop you from spreading the infection to the other eye or to others.  Report any problems that may be related to the prescribed medicine should they develop.   Discussed red flags and when to return to urgent care/see PCP.   Strict ER precautions discussed for any eye pain, severe headache, swelling/warmth/pain/redness of the orbit around the eye, vision changes, fever, chills, nausea, vomiting, lethargy.    - amoxicillin-clavulanate (AUGMENTIN) 875-125 MG Tab; Take 1 Tablet by mouth 2 times a day for 7 days.  Dispense: 14 Tablet; Refill: 0  - polymixin-trimethoprim (POLYTRIM) 10695-4.1 UNIT/ML-% Solution; Administer 1 Drop into both eyes every 4 hours for 10 days.  Dispense: 4 mL; Refill: 0         Pt is clinically stable at today's acute urgent care visit. Vital signs are normal and reassuring.  No acute distress noted. Appropriate for outpatient management at this time.        I personally reviewed prior external notes and test results pertinent to today's visit.  I have independently reviewed and interpreted all diagnostics ordered during this urgent care acute visit.        Please note that this dictation was created using voice recognition software. I have made a reasonable attempt to correct obvious errors, but I expect that there are errors of grammar and possibly content that I did not discover before finalizing the note.    This note was electronically signed by Haroldo MARTINEZ, SHAY, RENEE, CORNELIA

## 2024-09-11 ENCOUNTER — OFFICE VISIT (OUTPATIENT)
Dept: INTERNAL MEDICINE | Facility: OTHER | Age: 69
End: 2024-09-11
Payer: MEDICARE

## 2024-09-11 VITALS
HEIGHT: 68 IN | BODY MASS INDEX: 24.43 KG/M2 | DIASTOLIC BLOOD PRESSURE: 73 MMHG | OXYGEN SATURATION: 97 % | WEIGHT: 161.2 LBS | TEMPERATURE: 98.2 F | SYSTOLIC BLOOD PRESSURE: 136 MMHG | HEART RATE: 66 BPM

## 2024-09-11 DIAGNOSIS — B00.2 ORAL HERPES: ICD-10-CM

## 2024-09-11 DIAGNOSIS — J32.9 BACTERIAL SINUSITIS: ICD-10-CM

## 2024-09-11 DIAGNOSIS — B96.89 BACTERIAL SINUSITIS: ICD-10-CM

## 2024-09-11 PROBLEM — R09.89 THROAT CLEARING: Status: RESOLVED | Noted: 2024-06-12 | Resolved: 2024-09-11

## 2024-09-11 PROBLEM — H61.91 SKIN LESION OF RIGHT EAR: Status: RESOLVED | Noted: 2024-05-13 | Resolved: 2024-09-11

## 2024-09-11 PROCEDURE — 99214 OFFICE O/P EST MOD 30 MIN: CPT | Mod: GC

## 2024-09-11 RX ORDER — DOXYCYCLINE HYCLATE 100 MG
100 TABLET ORAL 2 TIMES DAILY
Qty: 14 TABLET | Refills: 0 | Status: SHIPPED | OUTPATIENT
Start: 2024-09-11 | End: 2024-09-18

## 2024-09-11 ASSESSMENT — ENCOUNTER SYMPTOMS
TREMORS: 0
SPUTUM PRODUCTION: 0
CONSTITUTIONAL NEGATIVE: 1
PSYCHIATRIC NEGATIVE: 1
PHOTOPHOBIA: 0
FEVER: 0
PALPITATIONS: 0
HEADACHES: 1
MUSCULOSKELETAL NEGATIVE: 1
ORTHOPNEA: 0
CARDIOVASCULAR NEGATIVE: 1
DOUBLE VISION: 0
COUGH: 1
SPEECH CHANGE: 0
CLAUDICATION: 0
MYALGIAS: 0
BLURRED VISION: 0
DEPRESSION: 0
HEMOPTYSIS: 0
ABDOMINAL PAIN: 0
HEARTBURN: 0
SHORTNESS OF BREATH: 0
HALLUCINATIONS: 0
NAUSEA: 0
VOMITING: 0
TINGLING: 0
BRUISES/BLEEDS EASILY: 0
GASTROINTESTINAL NEGATIVE: 1
EYE PAIN: 0
WEAKNESS: 1
BACK PAIN: 0
CHILLS: 0
EYES NEGATIVE: 1

## 2024-09-11 ASSESSMENT — LIFESTYLE VARIABLES: SUBSTANCE_ABUSE: 0

## 2024-09-11 ASSESSMENT — FIBROSIS 4 INDEX: FIB4 SCORE: 1.03

## 2024-09-11 NOTE — PROGRESS NOTES
"    NEW PATIENT VISIT      Patient ID:  Name: Jet Webb  YOB: 1955    Chief Complaint(s):      Establish Care with Primary Care Physician  New Patient and Sinus Problem      History of Present Illness:    This is a pleasant 69 y.o. male here due to persistent tract infection.  He has an appointment with Dr. Rahman in October to establish care with him as PCP.  He has a past medical history of non-Hodgkin lymphoma being monitored by oncology, normocytic anemia, allergies, asthma, paroxysmal atrial fibrillation ([id number] 1), recurrent cold sores being managed by acyclovir, mild aortic stenosis. hyperlipidemia managed by atorvastatin 10 mg.  He takes montelukast 10 mg daily and azelastine nasal spray for allergies and congestion.    He went to urgent care on 08/31/2024 due to feeling stickiness and discharge from his right eye along with some stuffiness in his nose.  Was worked up and diagnosed with bacterial conjunctivitis and acute rhino sinusitis.  He was started on Augmentin for 7 days and Polytrim eyedrops for 10 days.  He reported that his symptoms were significantly better once he finishes antibiotic course however 2 days later they started worsening and he started experiencing symptoms of congestion again.  He notes that he has green-colored mucus discharge that is sometimes streaked with blood whenever he blows his nose.  He is also experiencing symptoms of postnasal drip which caused him to cough periodically.  He denies chest pain, shortness of breath, fever, malaise, sinus or nasal tenderness.  He states that he overall feels fine except that he is very congested and slightly fatigued.  He also feels like he has a \"clogged ear sensation\". His vitals were stable upon visit today, he does not have a fever.  He is inquire about starting on a new antibiotic as he has an upcoming family visit, for which she says he needs to feel better by.  He also complains of having a sore on his " gum, he has a history of cold sore breakouts for which he takes acyclovir,, recently self reduced  to once a day instead of twice.    Review of Systems   Constitutional: Negative.  Negative for chills and fever.   HENT:  Positive for congestion.    Eyes: Negative.  Negative for blurred vision, double vision, photophobia and pain.   Respiratory:  Positive for cough. Negative for hemoptysis, sputum production and shortness of breath.    Cardiovascular: Negative.  Negative for chest pain, palpitations, orthopnea and claudication.   Gastrointestinal: Negative.  Negative for abdominal pain, heartburn, nausea and vomiting.   Genitourinary: Negative.  Negative for dysuria, frequency and urgency.   Musculoskeletal: Negative.  Negative for back pain, joint pain and myalgias.   Skin: Negative.    Neurological:  Positive for weakness and headaches. Negative for tingling, tremors and speech change.   Endo/Heme/Allergies: Negative.  Does not bruise/bleed easily.   Psychiatric/Behavioral: Negative.  Negative for depression, hallucinations, substance abuse and suicidal ideas.        Past Medical History:   Diagnosis Date    Cancer (HCC)     Lymphoma (HCC)      Past Surgical History:   Procedure Laterality Date    APPENDECTOMY      ARTHROPLASTY       Family History   Problem Relation Age of Onset    Heart Disease Father     Cancer Neg Hx      Social History     Tobacco Use    Smoking status: Never    Smokeless tobacco: Never   Vaping Use    Vaping status: Never Used   Substance Use Topics    Alcohol use: Yes     Comment: ocassional    Drug use: Never     Current Outpatient Medications   Medication Sig Dispense Refill    doxycycline (VIBRAMYCIN) 100 MG Tab Take 1 Tablet by mouth 2 times a day for 7 days. 14 Tablet 0    acyclovir (ZOVIRAX) 800 MG Tab Take 1 Tablet by mouth 2 times a day. 200 Tablet 2    atorvastatin (LIPITOR) 10 MG Tab Take 1 Tablet by mouth every evening. 100 Tablet 2    Azelastine (ASTELIN) 137 MCG/SPRAY Solution  "SPRAY TWO PUFFS INTO EACH NOSTRIL TWICE A DAY AS DIRECTED 90 mL 2    montelukast (SINGULAIR) 10 MG Tab Take 1 Tablet by mouth every day. 100 Tablet 2     No current facility-administered medications for this visit.       Objective:    /73 (BP Location: Right arm, Patient Position: Sitting, BP Cuff Size: Adult)   Pulse 66   Temp 36.8 °C (98.2 °F) (Temporal)   Ht 1.727 m (5' 8\")   Wt 73.1 kg (161 lb 3.2 oz)   SpO2 97%   BMI 24.51 kg/m² Body mass index is 24.51 kg/m².  Physical Exam  Constitutional:       Appearance: Normal appearance. He is normal weight.   HENT:      Head: Normocephalic.      Right Ear: External ear normal.      Left Ear: External ear normal.      Nose: Nose normal.      Mouth/Throat:      Mouth: Mucous membranes are moist.   Cardiovascular:      Rate and Rhythm: Normal rate and regular rhythm.      Pulses: Normal pulses.      Heart sounds: Murmur (2 out of 6 systolic murmur at the right upper sternal border) heard.   Pulmonary:      Effort: Pulmonary effort is normal. No respiratory distress.      Breath sounds: Normal breath sounds. No stridor. No wheezing, rhonchi or rales.   Chest:      Chest wall: No tenderness.   Abdominal:      General: Abdomen is flat. Bowel sounds are normal. There is no distension.      Tenderness: There is no abdominal tenderness.   Musculoskeletal:         General: No deformity or signs of injury. Normal range of motion.      Right lower leg: No edema.      Left lower leg: No edema.   Skin:     General: Skin is warm.      Capillary Refill: Capillary refill takes less than 2 seconds.      Findings: No bruising or rash.   Neurological:      General: No focal deficit present.      Mental Status: He is alert and oriented to person, place, and time.   Psychiatric:         Mood and Affect: Mood normal.         Behavior: Behavior normal.         Thought Content: Thought content normal.         Judgment: Judgment normal.         Assessment and Plan:   1. Bacterial " sinusitis  -Patient reports worsening of congestion symptoms, previously given a course of Augmentin for 7 days.  Reports that his condition got better, however worsened as soon as he finished antibiotics.  -Start doxycycline 100 mg twice a day, for 7 days.  -Instructed to sit upright and have pills with full glass of water.    2. Oral herpes  -Takes acyclovir for breakouts.  Reduce dose to once a day however is prescribed to take twice a day.    -Instructed to continue take acyclovir as prescribed.      Problem List Items Addressed This Visit       Normocytic anemia    Oral herpes     Other Visit Diagnoses       Bacterial sinusitis        Screening for metabolic disorder                Orders Placed This Encounter    doxycycline (VIBRAMYCIN) 100 MG Tab       Return in about 3 weeks (around 10/2/2024) for follow up establishing visit with Dr. marie in october.        Jac Thompson M.D. PGY-1  [facility] Internal [facility] Resident

## 2024-09-11 NOTE — PATIENT INSTRUCTIONS
Nice meeting you today!    - continue to rinse your nose/sinuses with a normal saline nasal spray  - continue allergy pill (montelukast) to help with congestion  - start doxycycline 100mg every 12 hours for 7 days, take the pill up right with a large glass of water sitting upright.

## 2024-10-02 ENCOUNTER — TELEPHONE (OUTPATIENT)
Dept: INTERNAL MEDICINE | Facility: OTHER | Age: 69
End: 2024-10-02
Payer: MEDICARE

## 2024-10-03 ENCOUNTER — APPOINTMENT (OUTPATIENT)
Dept: INTERNAL MEDICINE | Facility: OTHER | Age: 69
End: 2024-10-03
Payer: MEDICARE

## 2024-11-06 ENCOUNTER — TELEPHONE (OUTPATIENT)
Dept: INTERNAL MEDICINE | Facility: OTHER | Age: 69
End: 2024-11-06
Payer: MEDICARE

## 2024-11-08 ENCOUNTER — OFFICE VISIT (OUTPATIENT)
Dept: URGENT CARE | Facility: CLINIC | Age: 69
End: 2024-11-08
Payer: MEDICARE

## 2024-11-08 VITALS
RESPIRATION RATE: 16 BRPM | TEMPERATURE: 97.3 F | SYSTOLIC BLOOD PRESSURE: 120 MMHG | BODY MASS INDEX: 31.96 KG/M2 | DIASTOLIC BLOOD PRESSURE: 62 MMHG | HEART RATE: 71 BPM | WEIGHT: 162.8 LBS | OXYGEN SATURATION: 97 % | HEIGHT: 60 IN

## 2024-11-08 DIAGNOSIS — S01.81XA CHIN LACERATION, INITIAL ENCOUNTER: ICD-10-CM

## 2024-11-08 PROCEDURE — 12011 RPR F/E/E/N/L/M 2.5 CM/<: CPT | Performed by: NURSE PRACTITIONER

## 2024-11-08 PROCEDURE — 99213 OFFICE O/P EST LOW 20 MIN: CPT | Mod: 25 | Performed by: NURSE PRACTITIONER

## 2024-11-08 ASSESSMENT — ENCOUNTER SYMPTOMS
CONSTITUTIONAL NEGATIVE: 1
NEUROLOGICAL NEGATIVE: 1
SHORTNESS OF BREATH: 0
ABDOMINAL PAIN: 0
RESPIRATORY NEGATIVE: 1

## 2024-11-08 ASSESSMENT — VISUAL ACUITY: OU: 1

## 2024-11-08 ASSESSMENT — FIBROSIS 4 INDEX: FIB4 SCORE: 1.03

## 2024-11-08 NOTE — PROGRESS NOTES
Subjective:     Jet Webb is a 69 y.o. male who presents for Fall (Yesterday, (R) knee pain, Lac on chin, won't stop bleeding sore in chest )       Laceration   The laceration is 1 cm in size. His tetanus status is UTD.     Tdap received 8/9/2021 per chart review.    History of Present Illness  The patient is a 69-year-old male who presents for evaluation of a fall.    He experienced a fall while running with his dog he rescued on Tuesday, resulting in injuries to small abrasions to the right hand and laceration to chin. He also reports pain in his knee, which he believes may have been impacted during the fall. Despite these injuries, he is able to walk normally. He cleaned his chin laceration wound with water and applied Neosporin. The wound bled significantly at the time of the fall.    He has a history of right hip and thigh muscle issues, for which he is scheduled to see a specialist on Monday. He is uncertain if his leg gave out during the fall.    He has no issues with his temporomandibular joint (TMJ) and is able to open and close his mouth without difficulty. He has no missing teeth and his airway is clear.    He also reports mild soreness to the left anterior chest wall. Otherwise, no bruising or shortness of breath. He reports no abdominal pain or blood in his urine. He is not diabetic.    IMMUNIZATIONS  He is up to date on tetanus vaccine.    GENEVIEVE Copilot used during this encounter with the consent of the patient.     Review of Systems   Constitutional: Negative.    Respiratory: Negative.  Negative for shortness of breath.    Gastrointestinal:  Negative for abdominal pain.   Genitourinary:  Negative for hematuria.   Musculoskeletal:         Minimal right hand pain, left chest wall pain, right knee pain   Skin:         Chin lac   Neurological: Negative.    All other systems reviewed and are negative.    Refer to HPI for additional details.    During this visit, appropriate PPE was worn, and hand  "hygiene was performed.    PMH:  has a past medical history of Cancer (HCC) and Lymphoma (HCC).    MEDS:   Current Outpatient Medications:     acyclovir (ZOVIRAX) 800 MG Tab, Take 1 Tablet by mouth 2 times a day., Disp: 200 Tablet, Rfl: 2    atorvastatin (LIPITOR) 10 MG Tab, Take 1 Tablet by mouth every evening., Disp: 100 Tablet, Rfl: 2    Azelastine (ASTELIN) 137 MCG/SPRAY Solution, SPRAY TWO PUFFS INTO EACH NOSTRIL TWICE A DAY AS DIRECTED, Disp: 90 mL, Rfl: 2    montelukast (SINGULAIR) 10 MG Tab, Take 1 Tablet by mouth every day., Disp: 100 Tablet, Rfl: 2    ALLERGIES: No Known Allergies  SURGHX:   Past Surgical History:   Procedure Laterality Date    APPENDECTOMY      ARTHROPLASTY       SOCHX:  reports that he has never smoked. He has never used smokeless tobacco. He reports current alcohol use. He reports that he does not use drugs.    FH: Per HPI as applicable/pertinent.      Objective:     /62 (BP Location: Left arm, Patient Position: Sitting, BP Cuff Size: Adult long)   Pulse 71   Temp 36.3 °C (97.3 °F) (Temporal)   Resp 16   Ht 1.473 m (4' 10\")   Wt 73.8 kg (162 lb 12.8 oz)   SpO2 97%   BMI 34.03 kg/m²     Physical Exam  Nursing note reviewed.   Constitutional:       General: He is not in acute distress.     Appearance: He is well-developed. He is not ill-appearing or toxic-appearing.   HENT:      Head:      Jaw: No tenderness, swelling, pain on movement or malocclusion.        Comments: 1 cm straight, partial thickness laceration to chin, surrounding abrasion     Mouth/Throat:      Mouth: No injury.   Eyes:      General: Vision grossly intact.      Extraocular Movements: Extraocular movements intact.   Cardiovascular:      Rate and Rhythm: Normal rate and regular rhythm.      Heart sounds: Normal heart sounds.   Pulmonary:      Effort: Pulmonary effort is normal. No respiratory distress.      Breath sounds: Normal breath sounds. No decreased breath sounds.      Comments: Lung expansion equal " bilaterally  Chest:      Chest wall: Tenderness (Mlid to left anterior chest wall, no deformity or bruising, no rash, no crepitus) present.   Abdominal:      General: There is no distension.      Palpations: Abdomen is soft.      Tenderness: There is no abdominal tenderness.   Musculoskeletal:         General: No deformity. Normal range of motion.      Right hand: Laceration (Few abrasions) present. No swelling, deformity or tenderness. Normal range of motion. Normal strength. Normal sensation.      Cervical back: Normal range of motion.      Right knee: No deformity.   Skin:     General: Skin is warm and dry.      Coloration: Skin is not pale.      Findings: No bruising or rash.   Neurological:      Mental Status: He is alert and oriented to person, place, and time.      Motor: No weakness.      Gait: Gait normal.   Psychiatric:         Behavior: Behavior normal. Behavior is cooperative.       Assessment/Plan:     1. Chin laceration, initial encounter    Procedure: Laceration Repair of Chin  - Risks, benefits, methods, and alternatives of primary wound closure reviewed.  - Verbal consent received from patient to proceed with laceration repair with sutures.  - Wound length 1 cm, location chin as described above, no visible bone.  - Area copiously irrigated with NS; no foreign bodies identified.  - Site prepared with Betadine.  - Sterile technique with sterile instruments and gloves.  - Local anesthesia achieved with 2 mL of 1% lidocaine without epinephrine.  - Applied #3 interrupted sutures with 4.0 Ethilon; good wound edge approximation achieved.  - Irrigated copiously with NS.  - Minimal bleeding with good hemostasis achieved.   - Antibiotic ointment and non-adhesive dressing applied.  - There were no procedural complications.  - Patient tolerated procedure well.  - Tetanus UTD.    Advised basic wound care with mild soapy water.  Use OTC antibiotic ointment such as Neosporin.  Apply clean dressing as needed and  replace at least daily.  Monitor for signs/symptoms of infection.  May take OTC Tylenol or ibuprofen as needed for pain.  Follow-up in 5 to 7 days for suture removal.    Patient will defer x-rays at this time as left chest wall pain, right hand pain, and right knee pain likely related to minor strain vs contusion.    Monitor. Warning signs reviewed. Return precautions advised.     Differential diagnosis, natural history, supportive care, over-the-counter symptom management per 's instructions, close monitoring, and indications for immediate follow-up discussed.     All questions answered. Patient agrees with the plan of care.    Discharge summary provided via [facility].

## 2024-11-11 ENCOUNTER — OFFICE VISIT (OUTPATIENT)
Dept: INTERNAL MEDICINE | Facility: OTHER | Age: 69
End: 2024-11-11
Payer: MEDICARE

## 2024-11-11 VITALS
BODY MASS INDEX: 24.86 KG/M2 | SYSTOLIC BLOOD PRESSURE: 144 MMHG | HEIGHT: 68 IN | TEMPERATURE: 98.8 F | DIASTOLIC BLOOD PRESSURE: 75 MMHG | HEART RATE: 75 BPM | OXYGEN SATURATION: 95 % | WEIGHT: 164 LBS

## 2024-11-11 DIAGNOSIS — M25.551 RIGHT HIP PAIN: ICD-10-CM
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
## 2024-11-11 PROCEDURE — 3078F DIAST BP <80 MM HG: CPT | Mod: GC

## 2024-11-11 PROCEDURE — 3077F SYST BP >= 140 MM HG: CPT | Mod: GC

## 2024-11-11 PROCEDURE — 99214 OFFICE O/P EST MOD 30 MIN: CPT | Mod: GC

## 2024-11-11 ASSESSMENT — ENCOUNTER SYMPTOMS
FEVER: 0
SORE THROAT: 0
CONSTIPATION: 0
DIAPHORESIS: 0
CHILLS: 0
DIARRHEA: 0
NAUSEA: 0
DIZZINESS: 0
PHOTOPHOBIA: 0
WEAKNESS: 0
VOMITING: 0
MYALGIAS: 1
INSOMNIA: 0
COUGH: 0
EYE PAIN: 0
NERVOUS/ANXIOUS: 0
HEADACHES: 0
BLURRED VISION: 0
WHEEZING: 0
PALPITATIONS: 0
SHORTNESS OF BREATH: 0
DEPRESSION: 0
HEARTBURN: 0
ABDOMINAL PAIN: 0

## 2024-11-11 ASSESSMENT — FIBROSIS 4 INDEX: FIB4 SCORE: 1.03

## 2024-11-11 NOTE — PATIENT INSTRUCTIONS
Thank you for visiting [facility]!    A referral with physical therapy was made.    Try Tylenol to help mitigate the pain. Do not exceed 3 grams over a 24 hour period.     Please do not hesitate to contact me on MyChart should you need anything else!

## 2024-11-11 NOTE — PROGRESS NOTES
OFFICE VISIT    Jet Webb is a 69 y.o. male who presents today with the following:    Reason for visit: Right hip pain    HPI:    Jet Webb is a 68 y/o male with a past medical history of non-hodgkin's lymphoma, dyslipidemia who presents to discuss right hip pain and PT.    Right hip pain for the past year. He saw his orthopedic surgeon last December who did not recommend surgery at that time. XR in 2023 showed moderate degenerative changes of the right hip. Pain has been getting worse over time. Constant, waxes and wanes. Worse as the day progresses. Pain worsens with movement. It is localized and does not radiate. Occasionally, has low back pain. Pain worsens when he lays on his back or his right side in bed. Does wake him up at night due to the pain. He rates the pain as a 4-5/10 with some episodes where it can go to 8/10. Patient states the right hip pain would effect his gait where he would take shallower steps. Also endorses right thigh muscle pain that radiates down his RLE. Denies trauma or injuries. Patient reports he has been Advil, Tylenol have been helpful but not able to fully give him relief. He also does not want to be dependent on medications. Patient has not tried heating pad or ice. He states he has been more active lately after adopting a dog. Patient is looking to get a referral to PT because of this and perhaps eventually surgery.     He fell after his dog pulled him down 3 days ago. Patient had to go to urgent care where they placed 3 sutures on his chin. He also had a few scratches on his hand which are now healed. Patient was advised to have the sutures removed in 5 days.     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     Review of Systems   Constitutional:  Negative for chills, diaphoresis, fever and malaise/fatigue.   HENT:  Negative for congestion, ear pain, hearing loss and sore throat.    Eyes:  Negative for blurred vision, photophobia and pain.  "  Respiratory:  Negative for cough, shortness of breath and wheezing.    Cardiovascular:  Negative for chest pain, palpitations and leg swelling.   Gastrointestinal:  Negative for abdominal pain, constipation, diarrhea, heartburn, nausea and vomiting.   Genitourinary:  Negative for dysuria and frequency.   Musculoskeletal:  Positive for joint pain (right hip) and myalgias (right thigh).   Skin:  Negative for rash.   Neurological:  Negative for dizziness, weakness and headaches.   Psychiatric/Behavioral:  Negative for depression. The patient is not nervous/anxious and does not have insomnia.        Vitals:   BP (!) 144/75 (BP Location: Left arm, Patient Position: Sitting, BP Cuff Size: Adult)   Pulse 75   Temp 37.1 °C (98.8 °F) (Temporal)   Ht 1.727 m (5' 8\")   Wt 74.4 kg (164 lb)   SpO2 95%   BMI 24.94 kg/m²     Physical Exam  Vitals and nursing note reviewed.   Constitutional:       General: He is not in acute distress.     Appearance: He is not diaphoretic.   HENT:      Head: Normocephalic and atraumatic.      Mouth/Throat:      Mouth: Mucous membranes are moist.      Pharynx: Oropharynx is clear. No oropharyngeal exudate or posterior oropharyngeal erythema.   Cardiovascular:      Rate and Rhythm: Normal rate and regular rhythm.      Pulses: Normal pulses.      Heart sounds: Normal heart sounds. No murmur heard.     No gallop.   Pulmonary:      Effort: Pulmonary effort is normal. No respiratory distress.      Breath sounds: Normal breath sounds. No wheezing, rhonchi or rales.   Abdominal:      General: Bowel sounds are normal. There is no distension.      Palpations: Abdomen is soft.      Tenderness: There is no abdominal tenderness.   Musculoskeletal:         General: Tenderness (Right trochanter) present. Normal range of motion.      Comments: MS 5/5 with hip flexion and extension bilaterally  Full ROM with hip flexion, extension, abduction, and adduction bilaterally.  Pain with abduction and extension of " the right hip  Positive Sheryl on the right  Negative straight leg raise bilaterally   Skin:     General: Skin is warm.      Findings: No rash.      Comments: Sutures x3 on the chin without surrounding erythema or purulence.    Neurological:      General: No focal deficit present.      Mental Status: He is alert and oriented to person, place, and time.   Psychiatric:         Mood and Affect: Mood normal.         Behavior: Behavior normal.         Assessment and Plan:     Right hip pain  Given history and physical exam, he likely has osteoarthritis of the right hip and iliopsoas tightness.   -Will refer to PT  -Recommend Tylenol 325mg q6hrs, advised not to exceed 3 gms daily    Accident due to mechanical fall without injury  Had a fall 3 days ago after his dog pulled him down. Now s/p 3 sutures.  -Will follow up on 11/14 for suture removal      Orders Placed This Encounter    Referral to Physical Therapy       Return in about 3 days (around 11/14/2024) for for suture removal with me.      Patient case was seen/ assessed/ discussed with Dr. Gamble      Please note that this dictation was created using voice recognition software. I have made every reasonable attempt to correct obvious errors, but I expect that there are errors of grammar and possibly content that I did not discover before finalizing the note.       Signed by:    Sukumar Bunn DO    PGY-1 Internal Medicine Resident

## 2024-11-12 NOTE — ASSESSMENT & PLAN NOTE
Given history and physical exam, he likely has osteoarthritis of the right hip and iliopsoas tightness.   -Will refer to PT  -Recommend Tylenol 325mg q6hrs, advised not to exceed 3 gms daily

## 2024-11-12 NOTE — ASSESSMENT & PLAN NOTE
Had a fall 3 days ago after his dog pulled him down. Now s/p 3 sutures.  -Will follow up on 11/14 for suture removal

## 2024-11-14 ENCOUNTER — OFFICE VISIT (OUTPATIENT)
Dept: INTERNAL MEDICINE | Facility: OTHER | Age: 69
End: 2024-11-14
Payer: MEDICARE

## 2024-11-14 VITALS
HEIGHT: 68 IN | DIASTOLIC BLOOD PRESSURE: 79 MMHG | TEMPERATURE: 97.6 F | OXYGEN SATURATION: 96 % | HEART RATE: 70 BPM | SYSTOLIC BLOOD PRESSURE: 155 MMHG | BODY MASS INDEX: 24.64 KG/M2 | WEIGHT: 162.6 LBS

## 2024-11-14 DIAGNOSIS — S01.81XD CHIN LACERATION, SUBSEQUENT ENCOUNTER: ICD-10-CM

## 2024-11-14 DIAGNOSIS — R03.0 ELEVATED BLOOD PRESSURE READING: ICD-10-CM

## 2024-11-14 PROBLEM — S01.81XA CHIN LACERATION: Status: ACTIVE | Noted: 2024-11-14

## 2024-11-14 PROCEDURE — 99214 OFFICE O/P EST MOD 30 MIN: CPT | Mod: GC

## 2024-11-14 PROCEDURE — 3078F DIAST BP <80 MM HG: CPT | Mod: GC

## 2024-11-14 PROCEDURE — 3077F SYST BP >= 140 MM HG: CPT | Mod: GC

## 2024-11-14 ASSESSMENT — ENCOUNTER SYMPTOMS
HEARTBURN: 0
DIAPHORESIS: 0
HEADACHES: 0
CHILLS: 0
ABDOMINAL PAIN: 0
DIZZINESS: 0
WHEEZING: 0
EYE PAIN: 0
SHORTNESS OF BREATH: 0
COUGH: 0
FEVER: 0
NERVOUS/ANXIOUS: 0
PALPITATIONS: 0
BLURRED VISION: 0
CONSTIPATION: 0
NAUSEA: 0
DEPRESSION: 0
DIARRHEA: 0
INSOMNIA: 0
PHOTOPHOBIA: 0
VOMITING: 0
SORE THROAT: 0
WEAKNESS: 0

## 2024-11-14 ASSESSMENT — FIBROSIS 4 INDEX: FIB4 SCORE: 1.03

## 2024-11-14 NOTE — PROGRESS NOTES
"    OFFICE VISIT    Jet Webb is a 69 y.o. male who presents today with the following:    Reason for visit: Suture removal    HPI:    Patient presents for suture removal. He had a fall on 11/8 when he was running with his dog. This resulted in small abrasions and a laceration on his chin. At the time, his chin was bleeding profusely which prompted him to go to . 3 sutures were placed with instructions to have them removed in 5-7 days. No reported infection or complications with healing. No pus or drainage reported.     Review of Systems   Constitutional:  Negative for chills, diaphoresis, fever and malaise/fatigue.   HENT:  Negative for congestion, ear pain, hearing loss and sore throat.    Eyes:  Negative for blurred vision, photophobia and pain.   Respiratory:  Negative for cough, shortness of breath and wheezing.    Cardiovascular:  Negative for chest pain, palpitations and leg swelling.   Gastrointestinal:  Negative for abdominal pain, constipation, diarrhea, heartburn, nausea and vomiting.   Genitourinary:  Negative for dysuria and frequency.   Skin:  Negative for rash.   Neurological:  Negative for dizziness, weakness and headaches.   Psychiatric/Behavioral:  Negative for depression. The patient is not nervous/anxious and does not have insomnia.        Vitals:   BP (!) 155/79 (BP Location: Left arm, Patient Position: Sitting, BP Cuff Size: Adult) Comment: 2nd attempt  Pulse 70   Temp 36.4 °C (97.6 °F) (Temporal)   Ht 1.727 m (5' 8\")   Wt 73.8 kg (162 lb 9.6 oz)   SpO2 96%   BMI 24.72 kg/m²     Physical Exam  Vitals and nursing note reviewed.   Constitutional:       General: He is not in acute distress.     Appearance: He is not diaphoretic.   HENT:      Head: Normocephalic and atraumatic.      Mouth/Throat:      Mouth: Mucous membranes are moist.      Pharynx: Oropharynx is clear. No oropharyngeal exudate or posterior oropharyngeal erythema.   Cardiovascular:      Rate and Rhythm: Normal " rate and regular rhythm.      Pulses: Normal pulses.      Heart sounds: Normal heart sounds. No murmur heard.     No gallop.   Pulmonary:      Effort: Pulmonary effort is normal. No respiratory distress.      Breath sounds: Normal breath sounds. No wheezing, rhonchi or rales.   Abdominal:      General: Bowel sounds are normal. There is no distension.      Palpations: Abdomen is soft.      Tenderness: There is no abdominal tenderness.   Skin:     General: Skin is warm.      Findings: No rash.      Comments: Slightly erythematous  at the chin post suture removal with no overt bleeding.   Neurological:      General: No focal deficit present.      Mental Status: He is alert and oriented to person, place, and time.   Psychiatric:         Mood and Affect: Mood normal.         Behavior: Behavior normal.         Assessment and Plan:     Chin laceration  I performed suture removal x3 today with the supervision of Dr. Gamble. Chin was properly sanitized with an alcohol wipe. Sutures were removed with no complications. No blood loss and procedure was well tolerated.   -Advised to avoid shaving for the next few days and to apply OTC neosporin to prevent infection    Elevated blood pressure reading  Patient was hypertensive at clinic today. Likely secondary to pain and anxiety over removing his sutures. Patient to be re-evaluated at his next follow up with Dr. Rahman.      No orders of the defined types were placed in this encounter.      No follow-ups on file.      Patient case was seen/ assessed/ discussed with Dr. Gamble      Please note that this dictation was created using voice recognition software. I have made every reasonable attempt to correct obvious errors, but I expect that there are errors of grammar and possibly content that I did not discover before finalizing the note.       Signed by:    Sukumar Bunn DO    PGY-1 Internal Medicine Resident

## 2024-11-14 NOTE — ASSESSMENT & PLAN NOTE
I performed suture removal x3 today with the supervision of Dr. Gamble. Chin was properly sanitized with an alcohol wipe. Sutures were removed with no complications. No blood loss and procedure was well tolerated.   -Advised to avoid shaving for the next few days and to apply OTC neosporin to prevent infection

## 2024-11-14 NOTE — ASSESSMENT & PLAN NOTE
Patient was hypertensive at clinic today. Likely secondary to pain and anxiety over removing his sutures. Patient to be re-evaluated at his next follow up with Dr. Rahman.

## 2024-11-20 ENCOUNTER — PHYSICAL THERAPY (OUTPATIENT)
Dept: PHYSICAL THERAPY | Facility: REHABILITATION | Age: 69
End: 2024-11-20
Payer: MEDICARE

## 2024-11-20 DIAGNOSIS — M25.551 RIGHT HIP PAIN: ICD-10-CM

## 2024-11-20 PROCEDURE — 97161 PT EVAL LOW COMPLEX 20 MIN: CPT

## 2024-11-20 PROCEDURE — 97014 ELECTRIC STIMULATION THERAPY: CPT

## 2024-11-20 PROCEDURE — 97110 THERAPEUTIC EXERCISES: CPT

## 2024-11-20 PROCEDURE — 97140 MANUAL THERAPY 1/> REGIONS: CPT

## 2024-11-20 ASSESSMENT — ENCOUNTER SYMPTOMS
PAIN SCALE AT LOWEST: 1
PAIN SCALE: 2

## 2024-11-20 NOTE — OP THERAPY EVALUATION
Outpatient Physical Therapy  INITIAL EVALUATION    [facility] [address]  [address]  [place] [address]  Phone:  [phone]  Fax:  [phone]    Date of Evaluation: 2024    Patient: Jet Webb  YOB: 1955  MRN: [id number]     Referring Provider: Shar Rahman M.D.     Referring Diagnosis [id number] #1  MRN:[id number]  CSN:[id number]      Time Calculation  Start time: 721  Stop time: 830 Time Calculation (min): 69 minutes         Chief Complaint: No chief complaint on file.    Visit Diagnoses     ICD-10-CM   1. Right hip pain  M25.551       Date of onset of impairment: 2023    Subjective   History of Present Illness:     History of chief complaint:   Patient reports progressive R lateral glut pain and anterior upper 1/3  thigh R side only for the past 2 years.  Patient reports best upon waking and worse at end of day and with standing activities --recently moved to [place] and unpacked house.  Recent fall while walking dog with patient falling on his hands/face.    Prior level of function:  Retired--decreased activity pawswt 6 months    Pain:     Current pain ratin    At best pain ratin    Location:  R lateral glut and anterior thigh pain--denies n/t or burn--r thigh pain is pain    Aggravating factors:  Walk for more than 30' w/o pain  Lift case of water from floor w/o pain  Get up from floor w/o pain  Sit for more than 45' w/o pain upon standing  Up from toilet w;o pain  LEFS: 32  Sleep : up  5/night    Relieving factors:  NSAIDs--2 x 400mg /day        Past Medical History:   Diagnosis Date    Cancer (HCC)     Lymphoma (HCC)      Past Surgical History:   Procedure Laterality Date    APPENDECTOMY      ARTHROPLASTY         Precautions:       Objective   Observation and functional movement:  L leg slightly longer than R  Noted R toe out >L with     Range of motion and strength:    R: IR: 0--erp  Fle 381l9--068 p2  Er  55    Palpation and joint mobility:     Ttp: g min > med--noted  weakness  g med > min          Therapeutic Treatments and Modalities:     Therapeutic Treatment and Modalities Summary: Supine hip flexion ball roll wth MWMS lateral belt assist  Disscussed hip hinging and PICR  Quadruped rocking w/ and without  mwms--issued self #5 strap for patient to reproduce at home w/ self wmws  Citizen of [place] gluts hip flexionwith focus on PICR--10/10 with ball roll x 13' then 5' ball bridge 10/10 holds--hep     Time-based treatments/modalities:    Physical Therapy Timed Treatment Charges  Manual therapy minutes (CPT 52371): 8 minutes  Therapeutic exercise minutes (CPT 95567): 15 minutes      Assessment, Response and Plan:   Assessment details:  Patient present with R hip capsule pattern limitation  IR>Flex> er  and limited medial /lateral knee control with functional squatting activities.  Patient present with decreased arm swing, decreased stride, decrease transverse plane pelvic motion and L>R l.e. ER  with ambulation.  Patient demonstrates weak posterior chain  and posterio-lateral chain strength R>L.   Patient should do well for goals if compliant with poc.   Prognosis: fair    Goals:   Short Term Goals:   Walk for more than 30' w/o pain  Lift case of water from floor w/o pain  Sit for more than 45' w/o pain upon standing  Up from toilet w;o pain  LEFS: > 52  Sleep :through night      Long Term Goals:    Walk for more than 60' w/o pain  Get up from floor w/o pain  Sit for more than 90' w/o pain upon standing    LEFS: >65    Long term goal time span:  6-8 weeks    Plan:   Therapy options:  Physical therapy treatment to continue  Planned therapy interventions:  E Stim Unattended (CPT 30302), Manual Therapy (CPT 15202) and Therapeutic Exercise (CPT 26169)  Other planned therapy interventions:  Dry needle  Frequency:  2x week  Duration in weeks:  8  Duration in visits:  16  Plan details:  Belt mobs, MWMs, cupping iasdesiree, DN,  e-stim      Functional Assessment Used  Lower Extremity Functional Scale Percentage: 53.75     Referring provider co-signature:  I have reviewed this plan of care and my co-signature certifies the need for services.    Certification Period: 11/20/2024 to  01/19/25    Physician Signature: ________________________________ Date: ______________

## 2024-11-27 ENCOUNTER — APPOINTMENT (OUTPATIENT)
Dept: PHYSICAL THERAPY | Facility: REHABILITATION | Age: 69
End: 2024-11-27
Payer: MEDICARE

## 2024-12-02 ENCOUNTER — HOSPITAL ENCOUNTER (OUTPATIENT)
Dept: LAB | Facility: MEDICAL CENTER | Age: 69
End: 2024-12-02
Attending: STUDENT IN AN ORGANIZED HEALTH CARE EDUCATION/TRAINING PROGRAM
Payer: MEDICARE

## 2024-12-02 DIAGNOSIS — R74.8 ELEVATED ALKALINE PHOSPHATASE LEVEL: ICD-10-CM

## 2024-12-02 DIAGNOSIS — C82.01 GRADE 1 FOLLICULAR LYMPHOMA OF LYMPH NODES OF NECK (HCC): ICD-10-CM

## 2024-12-02 LAB
25(OH)D3 SERPL-MCNC: 25 NG/ML (ref 30–100)
ALBUMIN SERPL BCP-MCNC: 4.2 G/DL (ref 3.2–4.9)
ALBUMIN/GLOB SERPL: 1.4 G/DL
ALP SERPL-CCNC: 107 U/L (ref 30–99)
ALT SERPL-CCNC: 17 U/L (ref 2–50)
ANION GAP SERPL CALC-SCNC: 14 MMOL/L (ref 7–16)
AST SERPL-CCNC: 18 U/L (ref 12–45)
BASOPHILS # BLD AUTO: 0.8 % (ref 0–1.8)
BASOPHILS # BLD: 0.06 K/UL (ref 0–0.12)
BILIRUB SERPL-MCNC: 0.4 MG/DL (ref 0.1–1.5)
BUN SERPL-MCNC: 20 MG/DL (ref 8–22)
CALCIUM ALBUM COR SERPL-MCNC: 9.1 MG/DL (ref 8.5–10.5)
CALCIUM SERPL-MCNC: 9.3 MG/DL (ref 8.5–10.5)
CHLORIDE SERPL-SCNC: 107 MMOL/L (ref 96–112)
CO2 SERPL-SCNC: 24 MMOL/L (ref 20–33)
CREAT SERPL-MCNC: 0.76 MG/DL (ref 0.5–1.4)
EOSINOPHIL # BLD AUTO: 0.15 K/UL (ref 0–0.51)
EOSINOPHIL NFR BLD: 2.1 % (ref 0–6.9)
ERYTHROCYTE [DISTWIDTH] IN BLOOD BY AUTOMATED COUNT: 41.2 FL (ref 35.9–50)
GFR SERPLBLD CREATININE-BSD FMLA CKD-EPI: 96 ML/MIN/1.73 M 2
GLOBULIN SER CALC-MCNC: 2.9 G/DL (ref 1.9–3.5)
GLUCOSE SERPL-MCNC: 111 MG/DL (ref 65–99)
HCT VFR BLD AUTO: 39.2 % (ref 42–52)
HGB BLD-MCNC: 12.7 G/DL (ref 14–18)
IMM GRANULOCYTES # BLD AUTO: 0.01 K/UL (ref 0–0.11)
IMM GRANULOCYTES NFR BLD AUTO: 0.1 % (ref 0–0.9)
LDH SERPL L TO P-CCNC: 143 U/L (ref 107–266)
LYMPHOCYTES # BLD AUTO: 3.72 K/UL (ref 1–4.8)
LYMPHOCYTES NFR BLD: 51 % (ref 22–41)
MCH RBC QN AUTO: 30.4 PG (ref 27–33)
MCHC RBC AUTO-ENTMCNC: 32.4 G/DL (ref 32.3–36.5)
MCV RBC AUTO: 93.8 FL (ref 81.4–97.8)
MONOCYTES # BLD AUTO: 1.72 K/UL (ref 0–0.85)
MONOCYTES NFR BLD AUTO: 23.6 % (ref 0–13.4)
NEUTROPHILS # BLD AUTO: 1.64 K/UL (ref 1.82–7.42)
NEUTROPHILS NFR BLD: 22.4 % (ref 44–72)
NRBC # BLD AUTO: 0 K/UL
NRBC BLD-RTO: 0 /100 WBC (ref 0–0.2)
PLATELET # BLD AUTO: 291 K/UL (ref 164–446)
PMV BLD AUTO: 9 FL (ref 9–12.9)
POTASSIUM SERPL-SCNC: 4 MMOL/L (ref 3.6–5.5)
PROT SERPL-MCNC: 7.1 G/DL (ref 6–8.2)
RBC # BLD AUTO: 4.18 M/UL (ref 4.7–6.1)
SODIUM SERPL-SCNC: 145 MMOL/L (ref 135–145)
WBC # BLD AUTO: 7.3 K/UL (ref 4.8–10.8)

## 2024-12-02 PROCEDURE — 84080 ASSAY ALKALINE PHOSPHATASES: CPT

## 2024-12-02 PROCEDURE — 80053 COMPREHEN METABOLIC PANEL: CPT

## 2024-12-02 PROCEDURE — 85025 COMPLETE CBC W/AUTO DIFF WBC: CPT

## 2024-12-02 PROCEDURE — 83615 LACTATE (LD) (LDH) ENZYME: CPT

## 2024-12-02 PROCEDURE — 84075 ASSAY ALKALINE PHOSPHATASE: CPT

## 2024-12-02 PROCEDURE — 36415 COLL VENOUS BLD VENIPUNCTURE: CPT

## 2024-12-02 PROCEDURE — 82306 VITAMIN D 25 HYDROXY: CPT

## 2024-12-04 ENCOUNTER — PHYSICAL THERAPY (OUTPATIENT)
Dept: PHYSICAL THERAPY | Facility: REHABILITATION | Age: 69
End: 2024-12-04
Payer: MEDICARE

## 2024-12-04 DIAGNOSIS — M25.551 RIGHT HIP PAIN: ICD-10-CM

## 2024-12-04 LAB
ALP BONE SERPL-CCNC: 49 U/L (ref 0–55)
ALP ISOS SERPL HS-CCNC: 0 U/L
ALP LIVER SERPL-CCNC: 65 U/L (ref 0–94)
ALP SERPL-CCNC: 114 U/L (ref 40–120)

## 2024-12-04 PROCEDURE — 97014 ELECTRIC STIMULATION THERAPY: CPT

## 2024-12-04 PROCEDURE — 97110 THERAPEUTIC EXERCISES: CPT

## 2024-12-04 PROCEDURE — 97140 MANUAL THERAPY 1/> REGIONS: CPT

## 2024-12-04 NOTE — OP THERAPY DAILY TREATMENT
"  Outpatient Physical Therapy  DAILY TREATMENT     [facility] [address].  [address]  [place] [address]  Phone:  [phone]  Fax:  [phone]    Date: 12/04/2024    Patient: Jet Webb  YOB: 1955  MRN: [id number]     Time Calculation    Start time: 0720  Stop time: 0830 Time Calculation (min): 70 minutes         Chief Complaint: No chief complaint on file.    Visit #: 2    SUBJECTIVE:  Maybe less pain after ex. No change in functional pain    OBJECTIVE:  Elevating PICR  Fle 115  Ir 10  Er 65          Therapeutic Treatments and Modalities:     Therapeutic Treatment and Modalities Summary: Qudruped rocking--focus on hip dissociation//  Supine ball roll with focus on hip dissociation  Bridge marching --struggled x 10\"--- 10 deg hip ext  DN: Patient signed informed written release and verbally agreed with informed consent to procedure of dry needling   skin prep with isopropyl  Alcohol/Chlora prep  R tFL, r g min/med  -TENS w/ FDN    -No adverse reactions observed post treatment   Running man,  Prone hip ext  Toy soldier #2--hep  Bridge marching    Time-based treatments/modalities:    Physical Therapy Timed Treatment Charges  Manual therapy minutes (CPT 12939): 25 minutes  Therapeutic exercise minutes (CPT 40443): 30 minutes      Pain rating (1-10) before treatment:  1  Pain rating (1-10) after treatment:  0\" tired buttock but no pain\"    ASSESSMENT:   Reproduced leg and lateral hip pain with DN to TFL, g min/med  Noted dominant TFL  weak gluts and hip rotators --tolerated ex. W/ less pain. Noted elevating PICR with dominant hip fleion and poor glut recruitment    PLAN/RECOMMENDATIONS:   Post chain strngth with post lateral rotation control,DN         "

## 2024-12-05 ENCOUNTER — HOSPITAL ENCOUNTER (OUTPATIENT)
Dept: HEMATOLOGY ONCOLOGY | Facility: MEDICAL CENTER | Age: 69
End: 2024-12-05
Attending: STUDENT IN AN ORGANIZED HEALTH CARE EDUCATION/TRAINING PROGRAM
Payer: MEDICARE

## 2024-12-05 VITALS
RESPIRATION RATE: 17 BRPM | WEIGHT: 160 LBS | DIASTOLIC BLOOD PRESSURE: 62 MMHG | HEART RATE: 73 BPM | SYSTOLIC BLOOD PRESSURE: 122 MMHG | TEMPERATURE: 97.5 F | HEIGHT: 68 IN | BODY MASS INDEX: 24.25 KG/M2

## 2024-12-05 DIAGNOSIS — C82.01 GRADE 1 FOLLICULAR LYMPHOMA OF LYMPH NODES OF NECK (HCC): ICD-10-CM

## 2024-12-05 PROCEDURE — 99213 OFFICE O/P EST LOW 20 MIN: CPT | Performed by: STUDENT IN AN ORGANIZED HEALTH CARE EDUCATION/TRAINING PROGRAM

## 2024-12-05 PROCEDURE — 99212 OFFICE O/P EST SF 10 MIN: CPT | Performed by: STUDENT IN AN ORGANIZED HEALTH CARE EDUCATION/TRAINING PROGRAM

## 2024-12-05 ASSESSMENT — PAIN SCALES - GENERAL: PAINLEVEL_OUTOF10: NO PAIN

## 2024-12-05 ASSESSMENT — ENCOUNTER SYMPTOMS
CHILLS: 0
DIZZINESS: 0
BLURRED VISION: 0
INSOMNIA: 0
TINGLING: 0
FEVER: 0
VOMITING: 0
ORTHOPNEA: 0
HEADACHES: 0
PALPITATIONS: 0
MYALGIAS: 0
NAUSEA: 0
WHEEZING: 0
ABDOMINAL PAIN: 0
SINUS PAIN: 0
CONSTIPATION: 0
DIAPHORESIS: 0
DOUBLE VISION: 0
NERVOUS/ANXIOUS: 0
WEAKNESS: 0
SHORTNESS OF BREATH: 0
BACK PAIN: 0
WEIGHT LOSS: 0
SPUTUM PRODUCTION: 0
COUGH: 0
HEARTBURN: 0
SORE THROAT: 0
BLOOD IN STOOL: 0
DIARRHEA: 0
BRUISES/BLEEDS EASILY: 0

## 2024-12-05 ASSESSMENT — FIBROSIS 4 INDEX: FIB4 SCORE: 1.07

## 2024-12-05 NOTE — PROGRESS NOTES
Follow Up Note:  Hematology/Oncology      Primary Care:  Sukumar Bunn D.O.    Diagnosis: Follicular lymphoma    Chief Complaint: Surveillance visit    Current Treatment: NA    Prior Treatment: NA    Oncology History of Presenting Illness:  Jet Webb is a 68 y.o.  man who presents to the clinic for transfer of care for ongoing management for follicular lymphoma.  The patient was first diagnosed 5 and half years ago when he noticed a lymph node in his supraclavicular area on the right side, which has been enlarged for some time.  This was biopsied and found to be follicular lymphoma and he had continued follow-up with oncology in [place].  The patient never had any problems with B symptoms or endorgan damage, and was maintained on surveillance.  He has done well during this period of time and has recently retired and moved from [place] to [place].     Treatment History: N    Interval History:  Patient is here for follow up visit. He feels well but has been a bit fatigued due to having a lot of house renovation stuff going on. He did notice a lymph node in his right groin area.     Allergies as of 12/05/2024    (No Known Allergies)         Current Outpatient Medications:     acyclovir (ZOVIRAX) 800 MG Tab, Take 1 Tablet by mouth 2 times a day., Disp: 200 Tablet, Rfl: 2    atorvastatin (LIPITOR) 10 MG Tab, Take 1 Tablet by mouth every evening., Disp: 100 Tablet, Rfl: 2    Azelastine (ASTELIN) 137 MCG/SPRAY Solution, SPRAY TWO PUFFS INTO EACH NOSTRIL TWICE A DAY AS DIRECTED, Disp: 90 mL, Rfl: 2    montelukast (SINGULAIR) 10 MG Tab, Take 1 Tablet by mouth every day., Disp: 100 Tablet, Rfl: 2      Review of Systems:  Review of Systems   Constitutional:  Negative for chills, diaphoresis, fever, malaise/fatigue and weight loss.   HENT:  Negative for hearing loss, nosebleeds, sinus pain and sore throat.    Eyes:  Negative for blurred vision and double vision.   Respiratory:  Negative for cough, sputum  "production, shortness of breath and wheezing.    Cardiovascular:  Negative for chest pain, palpitations, orthopnea and leg swelling.   Gastrointestinal:  Negative for abdominal pain, blood in stool, constipation, diarrhea, heartburn, melena, nausea and vomiting.   Genitourinary:  Negative for dysuria, frequency, hematuria and urgency.   Musculoskeletal:  Negative for back pain, joint pain and myalgias.   Skin:  Negative for rash.   Neurological:  Negative for dizziness, tingling, weakness and headaches.   Endo/Heme/Allergies:  Does not bruise/bleed easily.   Psychiatric/Behavioral:  The patient is not nervous/anxious and does not have insomnia.          Physical Exam:  Vitals:    12/05/24 0849   BP: 122/62   BP Location: Right arm   Patient Position: Sitting   BP Cuff Size: Adult   Pulse: 73   Resp: 17   Temp: 36.4 °C (97.5 °F)   TempSrc: Temporal   Weight: 72.6 kg (160 lb)   Height: 1.727 m (5' 8\")       DESC; KARNOFSKY SCALE WITH ECOG EQUIVALENT: 100, Fully active, able to carry on all pre-disease performed without restriction (ECOG equivalent 0)    DISTRESS LEVEL: no apparent distress    Physical Exam  Vitals and nursing note reviewed.   Constitutional:       General: He is awake. He is not in acute distress.     Appearance: Normal appearance. He is normal weight. He is not ill-appearing, toxic-appearing or diaphoretic.   HENT:      Head: Normocephalic and atraumatic.      Nose: Nose normal. No congestion.      Mouth/Throat:      Pharynx: Oropharynx is clear. No oropharyngeal exudate or posterior oropharyngeal erythema.   Eyes:      General: No scleral icterus.     Extraocular Movements: Extraocular movements intact.      Conjunctiva/sclera: Conjunctivae normal.      Pupils: Pupils are equal, round, and reactive to light.   Cardiovascular:      Rate and Rhythm: Normal rate and regular rhythm.      Pulses: Normal pulses.      Heart sounds: Normal heart sounds. No murmur heard.     No friction rub. No gallop. "   Pulmonary:      Effort: Pulmonary effort is normal.      Breath sounds: Normal breath sounds. No decreased air movement. No wheezing, rhonchi or rales.   Abdominal:      General: Bowel sounds are normal. There is no distension.      Tenderness: There is no abdominal tenderness.   Musculoskeletal:         General: No deformity. Normal range of motion.      Cervical back: Normal range of motion and neck supple. No tenderness.      Right lower leg: No edema.      Left lower leg: No edema.   Lymphadenopathy:      Cervical: No cervical adenopathy.      Upper Body:      Right upper body: Supraclavicular adenopathy present. No axillary adenopathy.      Left upper body: No axillary adenopathy.      Lower Body: Right inguinal adenopathy present. No left inguinal adenopathy.   Skin:     General: Skin is warm and dry.      Coloration: Skin is not jaundiced.      Findings: No erythema or rash.   Neurological:      General: No focal deficit present.      Mental Status: He is alert and oriented to person, place, and time.      Sensory: Sensation is intact.      Motor: Motor function is intact. No weakness.      Gait: Gait is intact.   Psychiatric:         Attention and Perception: Attention normal.         Mood and Affect: Mood normal.         Behavior: Behavior normal. Behavior is cooperative.         Thought Content: Thought content normal.         Judgment: Judgment normal.           Labs:  Hospital Outpatient Visit on 12/02/2024   Component Date Value Ref Range Status    LDH Total 12/02/2024 143  107 - 266 U/L Final    Sodium 12/02/2024 145  135 - 145 mmol/L Final    Potassium 12/02/2024 4.0  3.6 - 5.5 mmol/L Final    Chloride 12/02/2024 107  96 - 112 mmol/L Final    Co2 12/02/2024 24  20 - 33 mmol/L Final    Anion Gap 12/02/2024 14.0  7.0 - 16.0 Final    Glucose 12/02/2024 111 (H)  65 - 99 mg/dL Final    Bun 12/02/2024 20  8 - 22 mg/dL Final    Creatinine 12/02/2024 0.76  0.50 - 1.40 mg/dL Final    Calcium 12/02/2024 9.3   8.5 - 10.5 mg/dL Final    Correct Calcium 12/02/2024 9.1  8.5 - 10.5 mg/dL Final    AST(SGOT) 12/02/2024 18  12 - 45 U/L Final    ALT(SGPT) 12/02/2024 17  2 - 50 U/L Final    Alkaline Phosphatase 12/02/2024 107 (H)  30 - 99 U/L Final    Total Bilirubin 12/02/2024 0.4  0.1 - 1.5 mg/dL Final    Albumin 12/02/2024 4.2  3.2 - 4.9 g/dL Final    Total Protein 12/02/2024 7.1  6.0 - 8.2 g/dL Final    Globulin 12/02/2024 2.9  1.9 - 3.5 g/dL Final    A-G Ratio 12/02/2024 1.4  g/dL Final    WBC 12/02/2024 7.3  4.8 - 10.8 K/uL Final    RBC 12/02/2024 4.18 (L)  4.70 - 6.10 M/uL Final    Hemoglobin 12/02/2024 12.7 (L)  14.0 - 18.0 g/dL Final    Hematocrit 12/02/2024 39.2 (L)  42.0 - 52.0 % Final    MCV 12/02/2024 93.8  81.4 - 97.8 fL Final    MCH 12/02/2024 30.4  27.0 - 33.0 pg Final    MCHC 12/02/2024 32.4  32.3 - 36.5 g/dL Final    RDW 12/02/2024 41.2  35.9 - 50.0 fL Final    Platelet Count 12/02/2024 291  164 - 446 K/uL Final    MPV 12/02/2024 9.0  9.0 - 12.9 fL Final    Neutrophils-Polys 12/02/2024 22.40 (L)  44.00 - 72.00 % Final    Lymphocytes 12/02/2024 51.00 (H)  22.00 - 41.00 % Final    Monocytes 12/02/2024 23.60 (H)  0.00 - 13.40 % Final    Eosinophils 12/02/2024 2.10  0.00 - 6.90 % Final    Basophils 12/02/2024 0.80  0.00 - 1.80 % Final    Immature Granulocytes 12/02/2024 0.10  0.00 - 0.90 % Final    Nucleated RBC 12/02/2024 0.00  0.00 - 0.20 /100 WBC Final    Neutrophils (Absolute) 12/02/2024 1.64 (L)  1.82 - 7.42 K/uL Final    Includes immature neutrophils, if present.    Lymphs (Absolute) 12/02/2024 3.72  1.00 - 4.80 K/uL Final    Monos (Absolute) 12/02/2024 1.72 (H)  0.00 - 0.85 K/uL Final    Eos (Absolute) 12/02/2024 0.15  0.00 - 0.51 K/uL Final    Baso (Absolute) 12/02/2024 0.06  0.00 - 0.12 K/uL Final    Immature Granulocytes (abs) 12/02/2024 0.01  0.00 - 0.11 K/uL Final    NRBC (Absolute) 12/02/2024 0.00  K/uL Final    GFR (CKD-EPI) 12/02/2024 96  >60 mL/min/1.73 m 2 Final    Comment: Estimated Glomerular  Filtration Rate is calculated using  race neutral CKD-EPI 2021 equation per NKF-ASN recommendations.     Hospital Outpatient Visit on 12/02/2024   Component Date Value Ref Range Status    25-Hydroxy   Vitamin D 25 12/02/2024 25 (L)  30 - 100 ng/mL Final    Comment: Adult Ranges:   <20 ng/mL - Deficiency  20-29 ng/mL - Insufficiency   ng/mL - Sufficiency  Electrochemiluminescence binding assay performed using Roche judson e  immunoassay analyzer.  The Elecsys Vitamin D total II assay is intended for  the quantitative determination of total 25 hydroxyvitamin D in human serum  and plasma. This assay is to be used as an aid in the assessment of vitamin  D sufficiency in adults.      Alkaline Phosphatase 12/02/2024 114  40 - 120 U/L Final    Liver Fractions 12/02/2024 65  0 - 94 U/L Final    Comment: INTERPRETIVE INFORMATION: Alk-Phosphatase Liver Calc  Bone Specific Alkaline Phosphatase (9052591) and 5'-nucleotidase  ([id number]) may be useful in identifying disorders of bone and  liver, respectively.      Bone Fractions 12/02/2024 49  0 - 55 U/L Final    Alk Phos Other Calc 12/02/2024 0  U/L Final    Comment: Performed By: [facility]  [address] [place] [id number]  : Mohit Garcia MD, PhD  CLIA Number: [id number]         Imaging:     All listed images below have been independently reviewed by me. I agree with the findings as summarized below:    No results found.    Pathology:  Reported follicular lymphoma    Assessment & Plan:  1. 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 He has been on surveillance for many years and presents for transfer of care and management.      Current Diagnosis and Staging: Follicular lymphoma, stage II    Update: Patient is doing well at this time. He is asymptomatic at this time so we will continue with monitoring.      Treatment Plan:  Surveillance     Treatment Citation: NCCN     Plan of Care:     Primary Therapy: NA  Supportive Therapy: NA  Toxicity: NA  Labs: CBC with diff, CMP, LDH monitoring  Imaging: CT scans as clinically indicated  Treatment Planning: Patient has low grade indolent follicular lymphoma. Continue with surveillance with labs and exam q 6 months.   Consultations: NANETTE  Code Status: Full  Miscellaneous: NA  Return for Follow Up: 6 months    Any questions and concerns raised by the patient were answered to the best of my ability. Thank you for allowing me to participate in the care for this patient. Please feel free to contact me for any questions or concerns.       Total time spent on chart review, clinic encounter, and documentation: 24 minutes.

## 2024-12-06 ENCOUNTER — PHYSICAL THERAPY (OUTPATIENT)
Dept: PHYSICAL THERAPY | Facility: REHABILITATION | Age: 69
End: 2024-12-06
Payer: MEDICARE

## 2024-12-06 DIAGNOSIS — M25.551 RIGHT HIP PAIN: ICD-10-CM

## 2024-12-06 PROCEDURE — 97116 GAIT TRAINING THERAPY: CPT

## 2024-12-06 PROCEDURE — 97110 THERAPEUTIC EXERCISES: CPT

## 2024-12-06 NOTE — OP THERAPY DAILY TREATMENT
"  Outpatient Physical Therapy  DAILY TREATMENT     [facility] [address].  [address]  [place] [address]  Phone:  [phone]  Fax:  [phone]    Date: 12/06/2024    Patient: Jet Webb  YOB: 1955  MRN: [id number]     Time Calculation    Start time: 0845  Stop time: 0930 Time Calculation (min): 45 minutes         Chief Complaint: No chief complaint on file.    Visit #: 3    SUBJECTIVE:better for a few days woke up today w/o pain but walked this am with increaed pain after 5'    OBJECTIVE:            Therapeutic Treatments and Modalities:     Therapeutic Treatment and Modalities Summary: Qudruped rocking--focus on hip dissociation//  Supine ball roll with focus on hip dissociation  Bridge marching --struggled x2 x 30\"--improved but still fatigued quickly   Running man--struggled due to glut weakness--modified end range abd Ir--short arc--used dowel from asis to wall as bfb   Gait trg with focus on reciprocal arm swing  Prone hip ext--focus on pelvic stability  Toy soldier #2-- hold ex due to glut weakness and dificulty isolating glut recruitment  Replace with side plank--se h.o  Active hip flesor stretch off edge of bed\     HEP      [email or web address]    Access Code: [email or web address]    Time-based treatments/modalities:    Physical Therapy Timed Treatment Charges  Gait training minutes (CPT 49582): 8 minutes  Therapeutic exercise minutes (CPT 12399): 30 minutes      Pain rating (1-10) before treatment:  1  Pain rating (1-10) after treatment:  0\" tired buttock but no pain\"    ASSESSMENT:   Noted cont. Limited g max/med strength, limited t-plane pelvic motion and limited arm swing--struggle to complete ex due to noted post chain weakness--reviewed improtance to fatigue and that means poor or incomplete form--STOP, if unable to perfrom ex. As directed    PLAN/RECOMMENDATIONS:   Post chain strngth with post lateral rotation control,DN         "

## 2024-12-09 ENCOUNTER — TELEPHONE (OUTPATIENT)
Dept: INTERNAL MEDICINE | Facility: OTHER | Age: 69
End: 2024-12-09
Payer: MEDICARE

## 2024-12-09 NOTE — TELEPHONE ENCOUNTER
----- Message from Resident Sukumar Bunn D.O. sent at 12/9/2024  2:58 AM PST -----  Regarding: RE: Question on Form request  The phone number is [phone]. This is [facility] I believe. Please can get you help me finish this task? Thank you!  ----- Message -----  From: Anna Lange Med Ass't  Sent: 12/6/2024   7:24 AM PST  To: Sukumar Bunn D.O.  Subject: FW: Question on Form request                     Hi Dr. Bunn,    It's no worries! Let me know if you would like me to call as well. I can do it as long as I know the facility and the patient. I am here until 5:30 in the clinic today.    Thanks,  Anna Lange Med Ass'saeed  ----- Message -----  From: Sukumar Bunn D.O.  Sent: 12/5/2024   9:35 PM PST  To: Anna Lange Med Ass't; #  Subject: RE: Question on Form request                     I will try and call tomorrow when I have time. Sorry for the inconvenience  ----- Message -----  From: Anna Lange Med Ass't  Sent: 12/5/2024   2:09 PM PST  To: Sukumar Bunn D.O.; [facility]  Subject: FW: Question on Form request                     Hi all,    I haven't received any forms unfortunately. I sent a message to Dr. Bunn this morning though to let him know that he should tell the PT office to fax us a form at [phone] so we can fill it out. Nothing yet so far.    Thanks,  Anna Lange Med Ass'saeed  ----- Message -----  From: Armida Lea Med Ass't  Sent: 12/5/2024   2:02 PM PST  To: Anna Lange Med Ass't  Subject: FW: Question on Form request                     Have you received any forms requesting a signature for PT sessions?  ----- Message -----  From: Lupis Richards Med Ass't  Sent: 12/4/2024   7:57 AM PST  To: Armida Lea Med Ass't  Subject: FW: Question on Form request                       ----- Message -----  From: Sukumar Bunn D.O.  Sent: 12/3/2024  10:23 PM PST  To: Alban Gupta Ma  Subject: Question on Form request                         Hey  so I received a request from the physical therapist to sign a form or something for the PT sessions. I am not sure how to accomplish this. Do I need to fill out the form in person?

## 2024-12-09 NOTE — TELEPHONE ENCOUNTER
Called and spoke to Sheila from PT office - will relay message to Marcial LAZOT about fax number and sending over orders. Will await orders when it is faxed

## 2024-12-10 ENCOUNTER — PHYSICAL THERAPY (OUTPATIENT)
Dept: PHYSICAL THERAPY | Facility: REHABILITATION | Age: 69
End: 2024-12-10
Payer: MEDICARE

## 2024-12-10 DIAGNOSIS — M25.551 RIGHT HIP PAIN: ICD-10-CM

## 2024-12-10 PROCEDURE — 97110 THERAPEUTIC EXERCISES: CPT

## 2024-12-10 PROCEDURE — 97140 MANUAL THERAPY 1/> REGIONS: CPT

## 2024-12-10 PROCEDURE — 97014 ELECTRIC STIMULATION THERAPY: CPT

## 2024-12-10 NOTE — OP THERAPY DAILY TREATMENT
"  Outpatient Physical Therapy  DAILY TREATMENT     [facility] [address].  [address]  [place] [address]  Phone:  [phone]  Fax:  [phone]    Date: 12/10/2024    Patient: Jet Webb  YOB: 1955  MRN: [id number]     Time Calculation    Start time: 0845  Stop time: 0945 Time Calculation (min): 60 minutes         Chief Complaint: No chief complaint on file.    Visit #: 4    SUBJECTIVE: less pain with walking with and without dog on leash --noted sitting increased pain w/ sit for more than 5' --patient reports that he is sleeping    OBJECTIVE:            Therapeutic Treatments and Modalities:     Therapeutic Treatment and Modalities Summary: Qudruped rocking--focus on hip dissociation//  Supine ball roll with focus on hip dissociation  Bridge marching 30\" hold then 30\" marching--noted R weaker x 2  Reil x 10--slight peripheralized  P/a R TP with asis// centralized  Melissa with RSB:// no change with lateral hip pain  Isntructed prone extension over ball with with pillow  Seil with R hip fle --progressed to reil with and without R hip flexion  Standing R side sided glide with with o/p--progressed to patient initiated R side glides with extension against wall  Seil with russian 5/5/ mhp x 15 with  prone  bilateral superman holds throughout treatment    ##reviewed HEP frequency with jp progression for psot./hip pain and pain associated with sitting    Time-based treatments/modalities:    Physical Therapy Timed Treatment Charges  Manual therapy minutes (CPT 82231): 10 minutes  Therapeutic exercise minutes (CPT 70880): 30 minutes      Pain rating (1-10) before treatment:  2- r glut  Pain rating (1-10) after treatment:  0\" tired buttock but no pain\"    ASSESSMENT:   Increased tolerance to walking with noted increased c/o lbp with sitting and noted  limited l/s extension--centralized with extension.  Cont. To present with tight/short hip flexors and weak " gluts/hamstrings    PLAN/RECOMMENDATIONS:   Post chain strngth with post lateral rotation control,DN,  progress jp as tolerated

## 2024-12-11 ENCOUNTER — APPOINTMENT (OUTPATIENT)
Dept: PHYSICAL THERAPY | Facility: REHABILITATION | Age: 69
End: 2024-12-11
Payer: MEDICARE

## 2024-12-17 ENCOUNTER — PHYSICAL THERAPY (OUTPATIENT)
Dept: PHYSICAL THERAPY | Facility: REHABILITATION | Age: 69
End: 2024-12-17
Payer: MEDICARE

## 2024-12-17 DIAGNOSIS — M25.551 RIGHT HIP PAIN: ICD-10-CM

## 2024-12-17 PROCEDURE — 97110 THERAPEUTIC EXERCISES: CPT

## 2024-12-17 PROCEDURE — 97014 ELECTRIC STIMULATION THERAPY: CPT

## 2024-12-17 PROCEDURE — 97140 MANUAL THERAPY 1/> REGIONS: CPT

## 2024-12-17 NOTE — OP THERAPY DAILY TREATMENT
"  Outpatient Physical Therapy  DAILY TREATMENT     [facility] [address]  [address]  [place] [address]  Phone:  [phone]  Fax:  [phone]    Date: 12/17/2024    Patient: Jet Webb  YOB: 1955  MRN: [id number]     Time Calculation    Start time: 0850  Stop time: 1005 Time Calculation (min): 75 minutes         Chief Complaint: No chief complaint on file.    Visit #: 5    SUBJECTIVE: less frequency and duration with improving walking walking tolerance.  Opne bout of L leg giving way  OBJECTIVE:            Therapeutic Treatments and Modalities:     Therapeutic Treatment and Modalities Summary: Reil x 10--slight peripheralized but resolved  Quadruped rocking--focus on hip dissociation//  Superman 2'  ART R g min/med  Bridge marching x 1'  Running kevin x 1'// focussed on end range hip ext.  DN: Patient signed informed written release and verbally agreed with informed consent to procedure of dry needling   skin prep with isopropyl  Alcohol/Chlora prep  R tFL, r g min/med  -TENS w/ FDN    -No adverse reactions observed post treatment   HEP focus: 4 ex:  Superman --  Running man,  Prone hip extToy soldier #2--hep  Bridge marching          Time-based treatments/modalities:    Physical Therapy Timed Treatment Charges  Manual therapy minutes (CPT 86838): 10 minutes  Therapeutic exercise minutes (CPT 69693): 30 minutes      Pain rating (1-10) before treatment:  2- r glut  Pain rating (1-10) after treatment:  0\" tired buttock but no pain\"    ASSESSMENT:   Increased tolerance to walking but cont. To present with glut tendinopathy with noted weakness g min/med  Fair- hep compliance for glut strengthening ex.    PLAN/RECOMMENDATIONS:   Post chain strngth with post lateral rotation control, DN,  progress jp as tolerated         "

## 2024-12-19 DIAGNOSIS — B00.2 ORAL HERPES: ICD-10-CM

## 2024-12-19 DIAGNOSIS — E78.5 DYSLIPIDEMIA: ICD-10-CM

## 2024-12-19 DIAGNOSIS — Z91.09 ENVIRONMENTAL ALLERGIES: ICD-10-CM

## 2024-12-19 RX ORDER — ACYCLOVIR 800 MG/1
800 TABLET ORAL 2 TIMES DAILY
Qty: 200 TABLET | Refills: 2 | Status: ON HOLD | OUTPATIENT
Start: 2024-12-19

## 2024-12-19 RX ORDER — AZELASTINE HYDROCHLORIDE 137 UG/1
SPRAY, METERED NASAL
Qty: 90 ML | Refills: 2 | Status: ON HOLD | OUTPATIENT
Start: 2024-12-19

## 2024-12-19 RX ORDER — ATORVASTATIN CALCIUM 10 MG/1
10 TABLET, FILM COATED ORAL NIGHTLY
Qty: 100 TABLET | Refills: 2 | Status: ON HOLD | OUTPATIENT
Start: 2024-12-19

## 2024-12-19 RX ORDER — MONTELUKAST SODIUM 10 MG/1
10 TABLET ORAL DAILY
Qty: 100 TABLET | Refills: 2 | Status: ON HOLD | OUTPATIENT
Start: 2024-12-19

## 2024-12-19 NOTE — TELEPHONE ENCOUNTER
Patient LVM. Would like refills on singular, Acyclovir, atorvastatin, Azelastine. He would like refills sent to [facility]. Patient would like a call back when they have been sent,

## 2024-12-28 ENCOUNTER — APPOINTMENT (OUTPATIENT)
Dept: RADIOLOGY | Facility: MEDICAL CENTER | Age: 69
DRG: 442 | End: 2024-12-28
Attending: EMERGENCY MEDICINE
Payer: MEDICARE

## 2024-12-28 ENCOUNTER — HOSPITAL ENCOUNTER (OUTPATIENT)
Facility: MEDICAL CENTER | Age: 69
End: 2024-12-28
Attending: NURSE PRACTITIONER
Payer: MEDICARE

## 2024-12-28 ENCOUNTER — HOSPITAL ENCOUNTER (INPATIENT)
Facility: MEDICAL CENTER | Age: 69
End: 2024-12-28
Attending: EMERGENCY MEDICINE | Admitting: INTERNAL MEDICINE
Payer: MEDICARE

## 2024-12-28 ENCOUNTER — HOSPITAL ENCOUNTER (OUTPATIENT)
Dept: LAB | Facility: MEDICAL CENTER | Age: 69
End: 2024-12-28
Attending: NURSE PRACTITIONER
Payer: MEDICARE

## 2024-12-28 ENCOUNTER — OFFICE VISIT (OUTPATIENT)
Dept: URGENT CARE | Facility: CLINIC | Age: 69
End: 2024-12-28
Payer: MEDICARE

## 2024-12-28 ENCOUNTER — TELEPHONE (OUTPATIENT)
Dept: INTERNAL MEDICINE | Facility: OTHER | Age: 69
End: 2024-12-28

## 2024-12-28 VITALS
RESPIRATION RATE: 14 BRPM | HEIGHT: 68 IN | HEART RATE: 82 BPM | TEMPERATURE: 97.2 F | OXYGEN SATURATION: 97 % | WEIGHT: 161.2 LBS | SYSTOLIC BLOOD PRESSURE: 126 MMHG | DIASTOLIC BLOOD PRESSURE: 70 MMHG | BODY MASS INDEX: 24.43 KG/M2

## 2024-12-28 DIAGNOSIS — R78.81 BACTEREMIA: ICD-10-CM

## 2024-12-28 DIAGNOSIS — C82.01 GRADE 1 FOLLICULAR LYMPHOMA OF LYMPH NODES OF NECK (HCC): ICD-10-CM

## 2024-12-28 DIAGNOSIS — R50.9 FEVER, UNKNOWN ORIGIN: ICD-10-CM

## 2024-12-28 DIAGNOSIS — D72.829 LEUKOCYTOSIS, UNSPECIFIED TYPE: ICD-10-CM

## 2024-12-28 DIAGNOSIS — K75.0 LIVER ABSCESS: ICD-10-CM

## 2024-12-28 PROBLEM — R79.89 ELEVATED LFTS: Status: ACTIVE | Noted: 2024-12-28

## 2024-12-28 PROBLEM — N13.30 HYDRONEPHROSIS OF RIGHT KIDNEY: Status: ACTIVE | Noted: 2024-12-28

## 2024-12-28 LAB
ALBUMIN SERPL BCP-MCNC: 3.8 G/DL (ref 3.2–4.9)
ALBUMIN/GLOB SERPL: 1.2 G/DL
ALP SERPL-CCNC: 123 U/L (ref 30–99)
ALT SERPL-CCNC: 93 U/L (ref 2–50)
ANION GAP SERPL CALC-SCNC: 12 MMOL/L (ref 7–16)
APPEARANCE UR: CLEAR
APPEARANCE UR: CLEAR
AST SERPL-CCNC: 84 U/L (ref 12–45)
BASOPHILS # BLD AUTO: 0 % (ref 0–1.8)
BASOPHILS # BLD AUTO: 0.2 % (ref 0–1.8)
BASOPHILS # BLD: 0 K/UL (ref 0–0.12)
BASOPHILS # BLD: 0.05 K/UL (ref 0–0.12)
BILIRUB SERPL-MCNC: 1.4 MG/DL (ref 0.1–1.5)
BILIRUB UR QL STRIP.AUTO: NEGATIVE
BILIRUB UR STRIP-MCNC: NEGATIVE MG/DL
BUN SERPL-MCNC: 19 MG/DL (ref 8–22)
CALCIUM ALBUM COR SERPL-MCNC: 9.3 MG/DL (ref 8.5–10.5)
CALCIUM SERPL-MCNC: 9.1 MG/DL (ref 8.5–10.5)
CHLORIDE SERPL-SCNC: 101 MMOL/L (ref 96–112)
CO2 SERPL-SCNC: 21 MMOL/L (ref 20–33)
COLOR UR AUTO: YELLOW
COLOR UR: YELLOW
CREAT SERPL-MCNC: 0.76 MG/DL (ref 0.5–1.4)
EOSINOPHIL # BLD AUTO: 0 K/UL (ref 0–0.51)
EOSINOPHIL # BLD AUTO: 0.01 K/UL (ref 0–0.51)
EOSINOPHIL NFR BLD: 0 % (ref 0–6.9)
EOSINOPHIL NFR BLD: 0 % (ref 0–6.9)
ERYTHROCYTE [DISTWIDTH] IN BLOOD BY AUTOMATED COUNT: 41.8 FL (ref 35.9–50)
ERYTHROCYTE [DISTWIDTH] IN BLOOD BY AUTOMATED COUNT: 43.6 FL (ref 35.9–50)
FLUAV RNA SPEC QL NAA+PROBE: NEGATIVE
FLUBV RNA SPEC QL NAA+PROBE: NEGATIVE
GFR SERPLBLD CREATININE-BSD FMLA CKD-EPI: 96 ML/MIN/1.73 M 2
GLOBULIN SER CALC-MCNC: 3.1 G/DL (ref 1.9–3.5)
GLUCOSE SERPL-MCNC: 149 MG/DL (ref 65–99)
GLUCOSE UR STRIP.AUTO-MCNC: NEGATIVE MG/DL
GLUCOSE UR STRIP.AUTO-MCNC: NEGATIVE MG/DL
HCT VFR BLD AUTO: 36.1 % (ref 42–52)
HCT VFR BLD AUTO: 40.8 % (ref 42–52)
HGB BLD-MCNC: 11.9 G/DL (ref 14–18)
HGB BLD-MCNC: 12.8 G/DL (ref 14–18)
IMM GRANULOCYTES # BLD AUTO: 0.16 K/UL (ref 0–0.11)
IMM GRANULOCYTES NFR BLD AUTO: 0.7 % (ref 0–0.9)
KETONES UR STRIP.AUTO-MCNC: NEGATIVE MG/DL
KETONES UR STRIP.AUTO-MCNC: NEGATIVE MG/DL
LACTATE SERPL-SCNC: 0.8 MMOL/L (ref 0.5–2)
LACTATE SERPL-SCNC: 1 MMOL/L (ref 0.5–2)
LEUKOCYTE ESTERASE UR QL STRIP.AUTO: NEGATIVE
LEUKOCYTE ESTERASE UR QL STRIP.AUTO: NEGATIVE
LYMPHOCYTES # BLD AUTO: 3.33 K/UL (ref 1–4.8)
LYMPHOCYTES # BLD AUTO: 5.4 K/UL (ref 1–4.8)
LYMPHOCYTES NFR BLD: 13.8 % (ref 22–41)
LYMPHOCYTES NFR BLD: 19 % (ref 22–41)
MANUAL DIFF BLD: NORMAL
MCH RBC QN AUTO: 30.2 PG (ref 27–33)
MCH RBC QN AUTO: 30.5 PG (ref 27–33)
MCHC RBC AUTO-ENTMCNC: 31.4 G/DL (ref 32.3–36.5)
MCHC RBC AUTO-ENTMCNC: 33 G/DL (ref 32.3–36.5)
MCV RBC AUTO: 92.6 FL (ref 81.4–97.8)
MCV RBC AUTO: 96.2 FL (ref 81.4–97.8)
MICRO URNS: NORMAL
MONOCYTES # BLD AUTO: 1.22 K/UL (ref 0–0.85)
MONOCYTES # BLD AUTO: 2.47 K/UL (ref 0–0.85)
MONOCYTES NFR BLD AUTO: 10.3 % (ref 0–13.4)
MONOCYTES NFR BLD AUTO: 4.3 % (ref 0–13.4)
MORPHOLOGY BLD-IMP: NORMAL
NEUTROPHILS # BLD AUTO: 18.04 K/UL (ref 1.82–7.42)
NEUTROPHILS # BLD AUTO: 21.78 K/UL (ref 1.82–7.42)
NEUTROPHILS NFR BLD: 75 % (ref 44–72)
NEUTROPHILS NFR BLD: 76.7 % (ref 44–72)
NITRITE UR QL STRIP.AUTO: NEGATIVE
NITRITE UR QL STRIP.AUTO: NEGATIVE
NRBC # BLD AUTO: 0 K/UL
NRBC # BLD AUTO: 0 K/UL
NRBC BLD-RTO: 0 /100 WBC (ref 0–0.2)
NRBC BLD-RTO: 0 /100 WBC (ref 0–0.2)
PH UR STRIP.AUTO: 5.5 [PH] (ref 5–8)
PH UR STRIP.AUTO: 5.5 [PH] (ref 5–8)
PLATELET # BLD AUTO: 205 K/UL (ref 164–446)
PLATELET # BLD AUTO: 245 K/UL (ref 164–446)
PLATELET BLD QL SMEAR: NORMAL
PMV BLD AUTO: 8.6 FL (ref 9–12.9)
PMV BLD AUTO: 9.1 FL (ref 9–12.9)
POTASSIUM SERPL-SCNC: 3.8 MMOL/L (ref 3.6–5.5)
PROT SERPL-MCNC: 6.9 G/DL (ref 6–8.2)
PROT UR QL STRIP: NEGATIVE MG/DL
PROT UR QL STRIP: NORMAL MG/DL
RBC # BLD AUTO: 3.9 M/UL (ref 4.7–6.1)
RBC # BLD AUTO: 4.24 M/UL (ref 4.7–6.1)
RBC BLD AUTO: NORMAL
RBC UR QL AUTO: NEGATIVE
RBC UR QL AUTO: NORMAL
RSV RNA SPEC QL NAA+PROBE: NEGATIVE
SARS-COV-2 RNA RESP QL NAA+PROBE: NOTDETECTED
SODIUM SERPL-SCNC: 134 MMOL/L (ref 135–145)
SP GR UR STRIP.AUTO: 1.01
SP GR UR STRIP.AUTO: 1.01
UROBILINOGEN UR STRIP-MCNC: 0.2 MG/DL
UROBILINOGEN UR STRIP.AUTO-MCNC: 1 EU/DL
WBC # BLD AUTO: 24.1 K/UL (ref 4.8–10.8)
WBC # BLD AUTO: 28.4 K/UL (ref 4.8–10.8)

## 2024-12-28 PROCEDURE — 36415 COLL VENOUS BLD VENIPUNCTURE: CPT

## 2024-12-28 PROCEDURE — 87181 SC STD AGAR DILUTION PER AGT: CPT

## 2024-12-28 PROCEDURE — 3078F DIAST BP <80 MM HG: CPT | Performed by: NURSE PRACTITIONER

## 2024-12-28 PROCEDURE — 87040 BLOOD CULTURE FOR BACTERIA: CPT | Mod: 91

## 2024-12-28 PROCEDURE — 99214 OFFICE O/P EST MOD 30 MIN: CPT | Performed by: NURSE PRACTITIONER

## 2024-12-28 PROCEDURE — 3074F SYST BP LT 130 MM HG: CPT | Performed by: NURSE PRACTITIONER

## 2024-12-28 PROCEDURE — 770006 HCHG ROOM/CARE - MED/SURG/GYN SEMI*

## 2024-12-28 PROCEDURE — 99223 1ST HOSP IP/OBS HIGH 75: CPT | Mod: AI | Performed by: INTERNAL MEDICINE

## 2024-12-28 PROCEDURE — 87086 URINE CULTURE/COLONY COUNT: CPT | Mod: 91

## 2024-12-28 PROCEDURE — 87086 URINE CULTURE/COLONY COUNT: CPT

## 2024-12-28 PROCEDURE — 80053 COMPREHEN METABOLIC PANEL: CPT

## 2024-12-28 PROCEDURE — 700111 HCHG RX REV CODE 636 W/ 250 OVERRIDE (IP): Mod: JZ | Performed by: EMERGENCY MEDICINE

## 2024-12-28 PROCEDURE — 81002 URINALYSIS NONAUTO W/O SCOPE: CPT | Performed by: NURSE PRACTITIONER

## 2024-12-28 PROCEDURE — 96365 THER/PROPH/DIAG IV INF INIT: CPT

## 2024-12-28 PROCEDURE — 700111 HCHG RX REV CODE 636 W/ 250 OVERRIDE (IP): Mod: JZ | Performed by: INTERNAL MEDICINE

## 2024-12-28 PROCEDURE — 87077 CULTURE AEROBIC IDENTIFY: CPT | Mod: 91

## 2024-12-28 PROCEDURE — 99285 EMERGENCY DEPT VISIT HI MDM: CPT

## 2024-12-28 PROCEDURE — 87015 SPECIMEN INFECT AGNT CONCNTJ: CPT

## 2024-12-28 PROCEDURE — 85007 BL SMEAR W/DIFF WBC COUNT: CPT

## 2024-12-28 PROCEDURE — 700117 HCHG RX CONTRAST REV CODE 255: Performed by: EMERGENCY MEDICINE

## 2024-12-28 PROCEDURE — 700102 HCHG RX REV CODE 250 W/ 637 OVERRIDE(OP): Performed by: INTERNAL MEDICINE

## 2024-12-28 PROCEDURE — 700105 HCHG RX REV CODE 258: Performed by: INTERNAL MEDICINE

## 2024-12-28 PROCEDURE — 85025 COMPLETE CBC W/AUTO DIFF WBC: CPT

## 2024-12-28 PROCEDURE — 0241U HCHG SARS-COV-2 COVID-19 NFCT DS RESP RNA 4 TRGT ED POC: CPT

## 2024-12-28 PROCEDURE — A9270 NON-COVERED ITEM OR SERVICE: HCPCS | Performed by: INTERNAL MEDICINE

## 2024-12-28 PROCEDURE — 71045 X-RAY EXAM CHEST 1 VIEW: CPT

## 2024-12-28 PROCEDURE — 74177 CT ABD & PELVIS W/CONTRAST: CPT

## 2024-12-28 PROCEDURE — 700105 HCHG RX REV CODE 258: Performed by: EMERGENCY MEDICINE

## 2024-12-28 PROCEDURE — 81003 URINALYSIS AUTO W/O SCOPE: CPT

## 2024-12-28 PROCEDURE — 83605 ASSAY OF LACTIC ACID: CPT

## 2024-12-28 PROCEDURE — 85027 COMPLETE CBC AUTOMATED: CPT

## 2024-12-28 RX ORDER — ONDANSETRON 4 MG/1
4 TABLET, ORALLY DISINTEGRATING ORAL EVERY 4 HOURS PRN
Status: DISCONTINUED | OUTPATIENT
Start: 2024-12-28 | End: 2025-01-10 | Stop reason: HOSPADM

## 2024-12-28 RX ORDER — AMOXICILLIN 250 MG
2 CAPSULE ORAL EVERY EVENING
Status: DISCONTINUED | OUTPATIENT
Start: 2024-12-28 | End: 2025-01-10 | Stop reason: HOSPADM

## 2024-12-28 RX ORDER — ONDANSETRON 2 MG/ML
4 INJECTION INTRAMUSCULAR; INTRAVENOUS EVERY 4 HOURS PRN
Status: DISCONTINUED | OUTPATIENT
Start: 2024-12-28 | End: 2025-01-10 | Stop reason: HOSPADM

## 2024-12-28 RX ORDER — POLYETHYLENE GLYCOL 3350 17 G/17G
1 POWDER, FOR SOLUTION ORAL
Status: DISCONTINUED | OUTPATIENT
Start: 2024-12-28 | End: 2025-01-10 | Stop reason: HOSPADM

## 2024-12-28 RX ORDER — AZELASTINE HYDROCHLORIDE 137 UG/1
1 SPRAY, METERED NASAL DAILY
Status: DISCONTINUED | OUTPATIENT
Start: 2024-12-29 | End: 2024-12-28

## 2024-12-28 RX ORDER — ATORVASTATIN CALCIUM 10 MG/1
10 TABLET, FILM COATED ORAL NIGHTLY
Status: DISCONTINUED | OUTPATIENT
Start: 2024-12-28 | End: 2024-12-28

## 2024-12-28 RX ORDER — MORPHINE SULFATE 4 MG/ML
2 INJECTION INTRAVENOUS
Status: DISCONTINUED | OUTPATIENT
Start: 2024-12-28 | End: 2025-01-10 | Stop reason: HOSPADM

## 2024-12-28 RX ORDER — ENOXAPARIN SODIUM 100 MG/ML
40 INJECTION SUBCUTANEOUS DAILY
Status: DISCONTINUED | OUTPATIENT
Start: 2024-12-29 | End: 2025-01-10 | Stop reason: HOSPADM

## 2024-12-28 RX ORDER — ACETAMINOPHEN 325 MG/1
650 TABLET ORAL EVERY 4 HOURS PRN
COMMUNITY

## 2024-12-28 RX ORDER — MONTELUKAST SODIUM 10 MG/1
10 TABLET ORAL DAILY
Status: DISCONTINUED | OUTPATIENT
Start: 2024-12-28 | End: 2025-01-10 | Stop reason: HOSPADM

## 2024-12-28 RX ORDER — LABETALOL HYDROCHLORIDE 5 MG/ML
10 INJECTION, SOLUTION INTRAVENOUS EVERY 4 HOURS PRN
Status: DISCONTINUED | OUTPATIENT
Start: 2024-12-28 | End: 2025-01-10 | Stop reason: HOSPADM

## 2024-12-28 RX ORDER — ACETAMINOPHEN 325 MG/1
650 TABLET ORAL EVERY 6 HOURS PRN
Status: DISCONTINUED | OUTPATIENT
Start: 2024-12-28 | End: 2025-01-10 | Stop reason: HOSPADM

## 2024-12-28 RX ORDER — OXYCODONE HYDROCHLORIDE 5 MG/1
2.5 TABLET ORAL
Status: DISCONTINUED | OUTPATIENT
Start: 2024-12-28 | End: 2025-01-10 | Stop reason: HOSPADM

## 2024-12-28 RX ORDER — IBUPROFEN 200 MG
600 TABLET ORAL EVERY 6 HOURS PRN
COMMUNITY

## 2024-12-28 RX ORDER — OXYCODONE HYDROCHLORIDE 5 MG/1
5 TABLET ORAL
Status: DISCONTINUED | OUTPATIENT
Start: 2024-12-28 | End: 2025-01-10 | Stop reason: HOSPADM

## 2024-12-28 RX ORDER — ACYCLOVIR 800 MG/1
800 TABLET ORAL 2 TIMES DAILY
Status: COMPLETED | OUTPATIENT
Start: 2024-12-28 | End: 2025-01-04

## 2024-12-28 RX ADMIN — ACYCLOVIR 800 MG: 800 TABLET ORAL at 23:06

## 2024-12-28 RX ADMIN — PIPERACILLIN AND TAZOBACTAM 3.38 G: 3; .375 INJECTION, POWDER, FOR SOLUTION INTRAVENOUS at 21:16

## 2024-12-28 RX ADMIN — PIPERACILLIN AND TAZOBACTAM 4.5 G: 4; .5 INJECTION, POWDER, FOR SOLUTION INTRAVENOUS at 23:13

## 2024-12-28 RX ADMIN — ACETAMINOPHEN 650 MG: 325 TABLET ORAL at 23:05

## 2024-12-28 RX ADMIN — MONTELUKAST 10 MG: 10 TABLET, FILM COATED ORAL at 23:06

## 2024-12-28 RX ADMIN — IOHEXOL 100 ML: 350 INJECTION, SOLUTION INTRAVENOUS at 21:15

## 2024-12-28 SDOH — ECONOMIC STABILITY: TRANSPORTATION INSECURITY
IN THE PAST 12 MONTHS, HAS LACK OF RELIABLE TRANSPORTATION KEPT YOU FROM MEDICAL APPOINTMENTS, MEETINGS, WORK OR FROM GETTING THINGS NEEDED FOR DAILY LIVING?: NO

## 2024-12-28 ASSESSMENT — PATIENT HEALTH QUESTIONNAIRE - PHQ9
SUM OF ALL RESPONSES TO PHQ9 QUESTIONS 1 AND 2: 0
2. FEELING DOWN, DEPRESSED, IRRITABLE, OR HOPELESS: NOT AT ALL
1. LITTLE INTEREST OR PLEASURE IN DOING THINGS: NOT AT ALL

## 2024-12-28 ASSESSMENT — SOCIAL DETERMINANTS OF HEALTH (SDOH)
WITHIN THE PAST 12 MONTHS, YOU WORRIED THAT YOUR FOOD WOULD RUN OUT BEFORE YOU GOT THE MONEY TO BUY MORE: NEVER TRUE
WITHIN THE LAST YEAR, HAVE YOU BEEN KICKED, HIT, SLAPPED, OR OTHERWISE PHYSICALLY HURT BY YOUR PARTNER OR EX-PARTNER?: NO
WITHIN THE LAST YEAR, HAVE TO BEEN RAPED OR FORCED TO HAVE ANY KIND OF SEXUAL ACTIVITY BY YOUR PARTNER OR EX-PARTNER?: NO
IN THE PAST 12 MONTHS, HAS THE ELECTRIC, GAS, OIL, OR WATER COMPANY THREATENED TO SHUT OFF SERVICE IN YOUR HOME?: NO
WITHIN THE LAST YEAR, HAVE YOU BEEN HUMILIATED OR EMOTIONALLY ABUSED IN OTHER WAYS BY YOUR PARTNER OR EX-PARTNER?: NO
WITHIN THE PAST 12 MONTHS, THE FOOD YOU BOUGHT JUST DIDN'T LAST AND YOU DIDN'T HAVE MONEY TO GET MORE: NEVER TRUE
WITHIN THE LAST YEAR, HAVE YOU BEEN AFRAID OF YOUR PARTNER OR EX-PARTNER?: NO

## 2024-12-28 ASSESSMENT — ENCOUNTER SYMPTOMS
TREMORS: 0
BRUISES/BLEEDS EASILY: 0
CHANGE IN BOWEL HABIT: 0
CHILLS: 1
NAUSEA: 0
HALLUCINATIONS: 0
MYALGIAS: 1
HEADACHES: 0
VOMITING: 0
POLYDIPSIA: 0
NAUSEA: 0
SPUTUM PRODUCTION: 0
ABDOMINAL PAIN: 0
DOUBLE VISION: 0
FLANK PAIN: 0
NECK PAIN: 0
COUGH: 0
SORE THROAT: 0
SPEECH CHANGE: 0
ORTHOPNEA: 0
BLURRED VISION: 0
BACK PAIN: 0
PHOTOPHOBIA: 0
VOMITING: 0
NERVOUS/ANXIOUS: 0
HEMOPTYSIS: 0
CHILLS: 1
COUGH: 0
FOCAL WEAKNESS: 0
PALPITATIONS: 0
HEADACHES: 0
FEVER: 1
WEIGHT LOSS: 0
HEARTBURN: 0
FEVER: 1

## 2024-12-28 ASSESSMENT — FIBROSIS 4 INDEX
FIB4 SCORE: 3.02
FIB4 SCORE: 1.27
FIB4 SCORE: 1.07

## 2024-12-28 ASSESSMENT — LIFESTYLE VARIABLES: SUBSTANCE_ABUSE: 0

## 2024-12-28 ASSESSMENT — PAIN DESCRIPTION - PAIN TYPE: TYPE: ACUTE PAIN

## 2024-12-28 NOTE — PROGRESS NOTES
Subjective:     Jet Webb is a 69 y.o. male who presents for UTI (Started Thursday, fever, chills, fatigue achy, Neg COVID test, urinary frequency )      UTI  This is a new problem. The current episode started in the past 7 days (Jet is a pleasant 69 year old male who presents to  today with complaints of Fever X 3 days. T max 103. Fever is improved with use of motrin.). Associated symptoms include chills, a fever and myalgias. Pertinent negatives include no abdominal pain, change in bowel habit, congestion, coughing, headaches, nausea, sore throat or vomiting. Associated symptoms comments: Decreased appetite. He has tried NSAIDs and acetaminophen for the symptoms. The treatment provided moderate relief.         Review of Systems   Constitutional:  Positive for chills, fever and malaise/fatigue.   HENT:  Negative for congestion and sore throat.    Respiratory:  Negative for cough.    Gastrointestinal:  Negative for abdominal pain, change in bowel habit, nausea and vomiting.   Musculoskeletal:  Positive for myalgias.   Neurological:  Negative for headaches.       PMH:   Past Medical History:   Diagnosis Date    Cancer (HCC)     Lymphoma (HCC)      ALLERGIES: No Known Allergies  SURGHX:   Past Surgical History:   Procedure Laterality Date    APPENDECTOMY      ARTHROPLASTY       SOCHX:   Social History     Socioeconomic History    Marital status:    Tobacco Use    Smoking status: Never    Smokeless tobacco: Never   Vaping Use    Vaping status: Never Used   Substance and Sexual Activity    Alcohol use: Yes     Comment: ocassional    Drug use: Never   Social History Narrative     to his , recently moved from [place]. 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      Social Drivers of Health     Financial Resource Strain: Low Risk  (7/18/2024)    Overall Financial Resource Strain (CARDIA)     Difficulty of Paying Living Expenses: Not hard at all   Food Insecurity: No  Food Insecurity (7/18/2024)    Hunger Vital Sign     Worried About Running Out of Food in the Last Year: Never true     Ran Out of Food in the Last Year: Never true   Transportation Needs: No Transportation Needs (7/18/2024)    PRAPARE - Transportation     Lack of Transportation (Medical): No     Lack of Transportation (Non-Medical): No   Physical Activity: Sufficiently Active (12/1/2023)    Received from [facility]    Exercise Vital Sign     Days of Exercise per Week: 7 days     Minutes of Exercise per Session: 30 min   Stress: No Stress Concern Present (12/1/2023)    Received from [facility]    [facility] of Occupational Health - Occupational Stress Questionnaire     Feeling of Stress : Not at all   Social Connections: Moderately Integrated (12/1/2023)    Received from [facility]    Social Connection and Isolation Panel [NHANES]     Frequency of Communication with Friends and Family: More than three times a week     Frequency of Social Gatherings with Friends and Family: Three times a week     Attends Oriental orthodox Services: Never     Active Member of Clubs or Organizations: Yes     Attends Club or Organization Meetings: More than 4 times per year     Marital Status:    Intimate Partner Violence: Not At Risk (12/1/2023)    Received from [facility]    Humiliation, Afraid, Rape, and Kick questionnaire     Fear of Current or Ex-Partner: No     Emotionally Abused: No     Physically Abused: No     Sexually Abused: No   Housing Stability: Low Risk  (7/18/2024)    Housing Stability Vital Sign     Unable to Pay for Housing in the Last Year: No     Number of Places Lived in the Last Year: 1     Unstable Housing in the Last Year: No     FH:   Family History   Problem Relation Age of Onset    Heart Disease Father     Cancer Neg Hx          Objective:   /70 (BP Location: Left arm, Patient Position: Sitting, BP Cuff Size: Adult)   Pulse 82   Temp 36.2 °C  "(97.2 °F) (Temporal)   Resp 14   Ht 1.727 m (5' 8\")   Wt 73.1 kg (161 lb 3.2 oz)   SpO2 97%   BMI 24.51 kg/m²     Physical Exam  Vitals and nursing note reviewed.   Constitutional:       General: He is not in acute distress.     Appearance: Normal appearance. He is normal weight. He is not ill-appearing or toxic-appearing.   HENT:      Head: Normocephalic.      Right Ear: Tympanic membrane, ear canal and external ear normal.      Left Ear: Tympanic membrane, ear canal and external ear normal.      Nose: Nose normal. No congestion or rhinorrhea.      Mouth/Throat:      Mouth: Mucous membranes are moist.      Pharynx: No posterior oropharyngeal erythema.   Eyes:      General:         Right eye: No discharge.         Left eye: No discharge.      Extraocular Movements: Extraocular movements intact.      Conjunctiva/sclera: Conjunctivae normal.      Pupils: Pupils are equal, round, and reactive to light.   Cardiovascular:      Rate and Rhythm: Normal rate and regular rhythm.      Pulses: Normal pulses.      Heart sounds: Normal heart sounds.   Pulmonary:      Effort: Pulmonary effort is normal. No respiratory distress.      Breath sounds: Normal breath sounds. No stridor. No wheezing, rhonchi or rales.   Chest:      Chest wall: No tenderness.   Abdominal:      General: Abdomen is flat. There is no distension.      Palpations: There is no mass.      Tenderness: There is no abdominal tenderness. There is no guarding or rebound.   Musculoskeletal:         General: Normal range of motion.      Cervical back: Normal range of motion and neck supple. No rigidity.   Lymphadenopathy:      Cervical: No cervical adenopathy.   Skin:     General: Skin is warm and dry.   Neurological:      General: No focal deficit present.      Mental Status: He is alert and oriented to person, place, and time. Mental status is at baseline.   Psychiatric:         Mood and Affect: Mood normal.         Behavior: Behavior normal.         Judgment: " Judgment normal.       Results for orders placed or performed in visit on 12/28/24   POCT Urinalysis    Collection Time: 12/28/24 11:18 AM   Result Value Ref Range    POC Color yellow Negative    POC Appearance clear Negative    POC Glucose negative Negative mg/dL    POC Bilirubin negative Negative mg/dL    POC Ketones negative Negative mg/dL    POC Specific Gravity 1.010 <1.005 - >1.030    POC Blood trace-intact Negative    POC Urine PH 5.5 5.0 - 8.0    POC Protein trace Negative mg/dL    POC Urobiligen 0.2 Negative (0.2) mg/dL    POC Nitrites negative Negative    POC Leukocyte Esterase negative Negative         Assessment/Plan:   Assessment    1. Fever, unknown origin  CBC WITH DIFFERENTIAL    URINE CULTURE(NEW)    POCT Urinalysis      2. 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  He does suffer from non-Hodgkin's lymphoma.  He is followed by oncology and undergoing no treatment at this time.  Exam in the clinic today was essentially benign.  CBC was ordered for evaluation of fever of unknown origin.  Urinalysis collected in clinic today did have trace blood.  He currently has no UTI symptoms however, urine was sent for culture for further evaluation of bacteria.  I will notify him of results.  Patient to seek care in emergency room for higher level of care.

## 2024-12-29 ENCOUNTER — APPOINTMENT (OUTPATIENT)
Dept: RADIOLOGY | Facility: MEDICAL CENTER | Age: 69
DRG: 442 | End: 2024-12-29
Attending: INTERNAL MEDICINE
Payer: MEDICARE

## 2024-12-29 PROBLEM — Z71.89 ACP (ADVANCE CARE PLANNING): Status: ACTIVE | Noted: 2024-12-29

## 2024-12-29 LAB
ALBUMIN SERPL BCP-MCNC: 3.4 G/DL (ref 3.2–4.9)
ALBUMIN/GLOB SERPL: 1.2 G/DL
ALP SERPL-CCNC: 122 U/L (ref 30–99)
ALT SERPL-CCNC: 105 U/L (ref 2–50)
ANION GAP SERPL CALC-SCNC: 12 MMOL/L (ref 7–16)
AST SERPL-CCNC: 93 U/L (ref 12–45)
BASOPHILS # BLD AUTO: 0.1 % (ref 0–1.8)
BASOPHILS # BLD: 0.03 K/UL (ref 0–0.12)
BILIRUB SERPL-MCNC: 1.7 MG/DL (ref 0.1–1.5)
BUN SERPL-MCNC: 18 MG/DL (ref 8–22)
CALCIUM ALBUM COR SERPL-MCNC: 9.4 MG/DL (ref 8.5–10.5)
CALCIUM SERPL-MCNC: 8.9 MG/DL (ref 8.5–10.5)
CHLORIDE SERPL-SCNC: 101 MMOL/L (ref 96–112)
CO2 SERPL-SCNC: 22 MMOL/L (ref 20–33)
CREAT SERPL-MCNC: 0.9 MG/DL (ref 0.5–1.4)
EOSINOPHIL # BLD AUTO: 0 K/UL (ref 0–0.51)
EOSINOPHIL NFR BLD: 0 % (ref 0–6.9)
ERYTHROCYTE [DISTWIDTH] IN BLOOD BY AUTOMATED COUNT: 41.8 FL (ref 35.9–50)
GFR SERPLBLD CREATININE-BSD FMLA CKD-EPI: 91 ML/MIN/1.73 M 2
GLOBULIN SER CALC-MCNC: 2.9 G/DL (ref 1.9–3.5)
GLUCOSE SERPL-MCNC: 136 MG/DL (ref 65–99)
GRAM STN SPEC: NORMAL
HCT VFR BLD AUTO: 33.1 % (ref 42–52)
HGB BLD-MCNC: 11.1 G/DL (ref 14–18)
IMM GRANULOCYTES # BLD AUTO: 0.22 K/UL (ref 0–0.11)
IMM GRANULOCYTES NFR BLD AUTO: 1 % (ref 0–0.9)
INR PPP: 1.37 (ref 0.87–1.13)
LYMPHOCYTES # BLD AUTO: 1.35 K/UL (ref 1–4.8)
LYMPHOCYTES NFR BLD: 6.4 % (ref 22–41)
MCH RBC QN AUTO: 31 PG (ref 27–33)
MCHC RBC AUTO-ENTMCNC: 33.5 G/DL (ref 32.3–36.5)
MCV RBC AUTO: 92.5 FL (ref 81.4–97.8)
MONOCYTES # BLD AUTO: 2.48 K/UL (ref 0–0.85)
MONOCYTES NFR BLD AUTO: 11.7 % (ref 0–13.4)
NEUTROPHILS # BLD AUTO: 17.09 K/UL (ref 1.82–7.42)
NEUTROPHILS NFR BLD: 80.8 % (ref 44–72)
NRBC # BLD AUTO: 0 K/UL
NRBC BLD-RTO: 0 /100 WBC (ref 0–0.2)
PLATELET # BLD AUTO: 179 K/UL (ref 164–446)
PMV BLD AUTO: 9 FL (ref 9–12.9)
POTASSIUM SERPL-SCNC: 3.7 MMOL/L (ref 3.6–5.5)
PROT SERPL-MCNC: 6.3 G/DL (ref 6–8.2)
PROTHROMBIN TIME: 16.9 SEC (ref 12–14.6)
RBC # BLD AUTO: 3.58 M/UL (ref 4.7–6.1)
SIGNIFICANT IND 70042: NORMAL
SITE SITE: NORMAL
SODIUM SERPL-SCNC: 135 MMOL/L (ref 135–145)
SOURCE SOURCE: NORMAL
WBC # BLD AUTO: 21.2 K/UL (ref 4.8–10.8)

## 2024-12-29 PROCEDURE — 36415 COLL VENOUS BLD VENIPUNCTURE: CPT

## 2024-12-29 PROCEDURE — 770006 HCHG ROOM/CARE - MED/SURG/GYN SEMI*

## 2024-12-29 PROCEDURE — 99233 SBSQ HOSP IP/OBS HIGH 50: CPT | Performed by: STUDENT IN AN ORGANIZED HEALTH CARE EDUCATION/TRAINING PROGRAM

## 2024-12-29 PROCEDURE — 700111 HCHG RX REV CODE 636 W/ 250 OVERRIDE (IP): Mod: JZ | Performed by: RADIOLOGY

## 2024-12-29 PROCEDURE — 85025 COMPLETE CBC W/AUTO DIFF WBC: CPT

## 2024-12-29 PROCEDURE — 700105 HCHG RX REV CODE 258: Performed by: INTERNAL MEDICINE

## 2024-12-29 PROCEDURE — 700102 HCHG RX REV CODE 250 W/ 637 OVERRIDE(OP): Performed by: INTERNAL MEDICINE

## 2024-12-29 PROCEDURE — 99497 ADVNCD CARE PLAN 30 MIN: CPT | Performed by: STUDENT IN AN ORGANIZED HEALTH CARE EDUCATION/TRAINING PROGRAM

## 2024-12-29 PROCEDURE — 80053 COMPREHEN METABOLIC PANEL: CPT

## 2024-12-29 PROCEDURE — 87077 CULTURE AEROBIC IDENTIFY: CPT

## 2024-12-29 PROCEDURE — A9270 NON-COVERED ITEM OR SERVICE: HCPCS | Performed by: INTERNAL MEDICINE

## 2024-12-29 PROCEDURE — 85610 PROTHROMBIN TIME: CPT | Mod: 91

## 2024-12-29 PROCEDURE — 700111 HCHG RX REV CODE 636 W/ 250 OVERRIDE (IP): Mod: JZ

## 2024-12-29 PROCEDURE — 700111 HCHG RX REV CODE 636 W/ 250 OVERRIDE (IP): Mod: JZ | Performed by: INTERNAL MEDICINE

## 2024-12-29 PROCEDURE — 87205 SMEAR GRAM STAIN: CPT

## 2024-12-29 PROCEDURE — 87070 CULTURE OTHR SPECIMN AEROBIC: CPT

## 2024-12-29 PROCEDURE — 0F9130Z DRAINAGE OF RIGHT LOBE LIVER WITH DRAINAGE DEVICE, PERCUTANEOUS APPROACH: ICD-10-PCS | Performed by: RADIOLOGY

## 2024-12-29 PROCEDURE — 99153 MOD SED SAME PHYS/QHP EA: CPT

## 2024-12-29 RX ORDER — ONDANSETRON 2 MG/ML
4 INJECTION INTRAMUSCULAR; INTRAVENOUS PRN
Status: ACTIVE | OUTPATIENT
Start: 2024-12-29 | End: 2024-12-29

## 2024-12-29 RX ORDER — MIDAZOLAM HYDROCHLORIDE 1 MG/ML
INJECTION INTRAMUSCULAR; INTRAVENOUS
Status: COMPLETED
Start: 2024-12-29 | End: 2024-12-29

## 2024-12-29 RX ORDER — SODIUM CHLORIDE 9 MG/ML
500 INJECTION, SOLUTION INTRAVENOUS
Status: ACTIVE | OUTPATIENT
Start: 2024-12-29 | End: 2024-12-29

## 2024-12-29 RX ORDER — MIDAZOLAM HYDROCHLORIDE 1 MG/ML
.5-2 INJECTION INTRAMUSCULAR; INTRAVENOUS PRN
Status: ACTIVE | OUTPATIENT
Start: 2024-12-29 | End: 2024-12-29

## 2024-12-29 RX ADMIN — ACYCLOVIR 800 MG: 800 TABLET ORAL at 10:17

## 2024-12-29 RX ADMIN — FENTANYL CITRATE 25 MCG: 50 INJECTION, SOLUTION INTRAMUSCULAR; INTRAVENOUS at 12:18

## 2024-12-29 RX ADMIN — PIPERACILLIN AND TAZOBACTAM 4.5 G: 4; .5 INJECTION, POWDER, FOR SOLUTION INTRAVENOUS at 13:26

## 2024-12-29 RX ADMIN — OXYCODONE 5 MG: 5 TABLET ORAL at 13:19

## 2024-12-29 RX ADMIN — MONTELUKAST 10 MG: 10 TABLET, FILM COATED ORAL at 17:23

## 2024-12-29 RX ADMIN — ENOXAPARIN SODIUM 40 MG: 100 INJECTION SUBCUTANEOUS at 17:23

## 2024-12-29 RX ADMIN — MIDAZOLAM HYDROCHLORIDE 1 MG: 1 INJECTION, SOLUTION INTRAMUSCULAR; INTRAVENOUS at 12:10

## 2024-12-29 RX ADMIN — FENTANYL CITRATE 50 MCG: 50 INJECTION, SOLUTION INTRAMUSCULAR; INTRAVENOUS at 12:05

## 2024-12-29 RX ADMIN — FENTANYL CITRATE 25 MCG: 50 INJECTION, SOLUTION INTRAMUSCULAR; INTRAVENOUS at 12:14

## 2024-12-29 RX ADMIN — ACETAMINOPHEN 650 MG: 325 TABLET ORAL at 09:00

## 2024-12-29 RX ADMIN — MIDAZOLAM HYDROCHLORIDE 1 MG: 1 INJECTION, SOLUTION INTRAMUSCULAR; INTRAVENOUS at 12:05

## 2024-12-29 RX ADMIN — PIPERACILLIN AND TAZOBACTAM 4.5 G: 4; .5 INJECTION, POWDER, FOR SOLUTION INTRAVENOUS at 05:21

## 2024-12-29 RX ADMIN — ONDANSETRON 4 MG: 2 INJECTION INTRAMUSCULAR; INTRAVENOUS at 14:49

## 2024-12-29 RX ADMIN — ACETAMINOPHEN 650 MG: 325 TABLET ORAL at 17:23

## 2024-12-29 RX ADMIN — PIPERACILLIN AND TAZOBACTAM 4.5 G: 4; .5 INJECTION, POWDER, FOR SOLUTION INTRAVENOUS at 20:47

## 2024-12-29 RX ADMIN — ACYCLOVIR 800 MG: 800 TABLET ORAL at 20:45

## 2024-12-29 ASSESSMENT — LIFESTYLE VARIABLES
DOES PATIENT WANT TO STOP DRINKING: NO
EVER FELT BAD OR GUILTY ABOUT YOUR DRINKING: NO
HAVE YOU EVER FELT YOU SHOULD CUT DOWN ON YOUR DRINKING: NO
TOTAL SCORE: 0
CONSUMPTION TOTAL: NEGATIVE
TOTAL SCORE: 0
AVERAGE NUMBER OF DAYS PER WEEK YOU HAVE A DRINK CONTAINING ALCOHOL: 2
EVER HAD A DRINK FIRST THING IN THE MORNING TO STEADY YOUR NERVES TO GET RID OF A HANGOVER: NO
ON A TYPICAL DAY WHEN YOU DRINK ALCOHOL HOW MANY DRINKS DO YOU HAVE: 1
HAVE PEOPLE ANNOYED YOU BY CRITICIZING YOUR DRINKING: NO
HOW MANY TIMES IN THE PAST YEAR HAVE YOU HAD 5 OR MORE DRINKS IN A DAY: 0
ALCOHOL_USE: YES
TOTAL SCORE: 0

## 2024-12-29 ASSESSMENT — COGNITIVE AND FUNCTIONAL STATUS - GENERAL
DAILY ACTIVITIY SCORE: 22
CLIMB 3 TO 5 STEPS WITH RAILING: A LITTLE
SUGGESTED CMS G CODE MODIFIER DAILY ACTIVITY: CJ
MOBILITY SCORE: 20
WALKING IN HOSPITAL ROOM: A LITTLE
TOILETING: A LITTLE
DRESSING REGULAR LOWER BODY CLOTHING: A LITTLE
MOVING TO AND FROM BED TO CHAIR: A LITTLE
MOVING FROM LYING ON BACK TO SITTING ON SIDE OF FLAT BED: A LITTLE
SUGGESTED CMS G CODE MODIFIER MOBILITY: CJ

## 2024-12-29 ASSESSMENT — PAIN DESCRIPTION - PAIN TYPE
TYPE: ACUTE PAIN
TYPE: SURGICAL PAIN
TYPE: SURGICAL PAIN

## 2024-12-29 NOTE — ASSESSMENT & PLAN NOTE
In remission for 4-5 years   per oncology note is low-grade, not on active treatments.    Continue  surveillance per Dr. Chen

## 2024-12-29 NOTE — H&P
Hospital Medicine History & Physical Note    Date of Service  12/28/2024    Primary Care Physician  Sukumar Bunn D.O.      Code Status  Full Code    Chief Complaint  Chief Complaint   Patient presents with   • Sent from Urgent Care      wbc 24, fever, chills. Called patient who requested antibiotics however at this time there is no clear source of infection. He has a history of non-Hodgkin's lymphoma and reports new lymphadenopathy. Further work up is needed as fevers ar on going with no known source of infection. Advised patient to go to  ED further evaluation; he verbalized understanding and intention to go to ED.   • Fever     X 3 days now. Pt states taking advil for fever however it comes right back.    • Urinary Frequency     Pt endorses urinary urgency  has gotten much worse the last 3/4 days        History of Presenting Illness  Jet Webb is a 69 y.o. male with history of non-Hodgkin lymphoma diagnosed 5 years ago, low-grade stage II, kidney stones, dyslipidemia who presented 12/28/2024 with complaints of chills, fever up to 103 at home since Thursday, frequent urination for 3 days.  No other complaints offered.  He was seen at urgent care and referred to ER for further evaluation.  Temperature 100.4, on room air, heart rate 101.  Lactic acid is not elevated.  WBC 28.4, platelets 20.5, hemoglobin 11.9.  Chemistry showed some AST 84, ALT 93 elevation.  Blood sugar 149.  CT of the abdomen pelvis with contrast: Right liver lobe lesion 5.6 cm most suggestive of abscess.  Prominent pericaval, portacaval and debora hepatis lymph nodes.  Mild right hydronephrosis without obstructing ureteral stones demonstrated.  Small left kidney stone, nonobstructing..  Chest x-ray: No acute process.  I discussed the plan of care with patient, bedside RN, and ERP .    Review of Systems  Review of Systems   Constitutional:  Positive for chills, fever and malaise/fatigue. Negative for weight loss.   HENT:  Negative for  ear pain, hearing loss and tinnitus.    Eyes:  Negative for blurred vision, double vision and photophobia.   Respiratory:  Negative for cough, hemoptysis and sputum production.    Cardiovascular:  Negative for chest pain, palpitations and orthopnea.   Gastrointestinal:  Negative for heartburn, nausea and vomiting.   Genitourinary:  Positive for frequency. Negative for dysuria, flank pain and hematuria.   Musculoskeletal:  Positive for joint pain. Negative for back pain and neck pain.   Skin:  Negative for itching and rash.   Neurological:  Negative for tremors, speech change, focal weakness and headaches.   Endo/Heme/Allergies:  Negative for environmental allergies and polydipsia. Does not bruise/bleed easily.   Psychiatric/Behavioral:  Negative for hallucinations and substance abuse. The patient is not nervous/anxious.        Past Medical History   has a past medical history of Cancer (HCC) and Lymphoma (HCC).    Surgical History   has a past surgical history that includes appendectomy and arthroplasty.     Family History  family history includes Heart Disease in his father.   Family history reviewed with patient. There is no family history that is pertinent to the chief complaint.     Social History   reports that he has never smoked. He has never used smokeless tobacco. He reports current alcohol use. He reports that he does not use drugs.    Allergies  No Known Allergies    Medications  Prior to Admission Medications   Prescriptions Last Dose Informant Patient Reported? Taking?   Azelastine (ASTELIN) 137 MCG/SPRAY Solution   No No   Sig: SPRAY TWO PUFFS INTO EACH NOSTRIL TWICE A DAY AS DIRECTED   acyclovir (ZOVIRAX) 800 MG Tab   No No   Sig: Take 1 Tablet by mouth 2 times a day.   atorvastatin (LIPITOR) 10 MG Tab   No No   Sig: Take 1 Tablet by mouth every evening.   montelukast (SINGULAIR) 10 MG Tab   No No   Sig: Take 1 Tablet by mouth every day.      Facility-Administered Medications: None       Physical  Exam  Temp:  [36.2 °C (97.2 °F)-38 °C (100.4 °F)] 37.4 °C (99.3 °F)  Pulse:  [] 81  Resp:  [14-18] 18  BP: (107-139)/(56-70) 107/56  SpO2:  [95 %-97 %] 95 %  Blood Pressure : 107/56   Temperature: 37.4 °C (99.3 °F)   Pulse: 81   Respiration: 18   Pulse Oximetry: 95 %       Physical Exam  Vitals and nursing note reviewed.   Constitutional:       General: He is not in acute distress.     Appearance: Normal appearance.   HENT:      Head: Normocephalic and atraumatic.      Nose: Nose normal.      Mouth/Throat:      Mouth: Mucous membranes are moist.   Eyes:      Extraocular Movements: Extraocular movements intact.      Pupils: Pupils are equal, round, and reactive to light.   Cardiovascular:      Rate and Rhythm: Normal rate and regular rhythm.   Pulmonary:      Effort: Pulmonary effort is normal.      Breath sounds: Normal breath sounds.   Abdominal:      General: Abdomen is flat. There is no distension.      Tenderness: There is abdominal tenderness in the right upper quadrant. There is rebound. There is no guarding.   Musculoskeletal:         General: No swelling or deformity. Normal range of motion.      Cervical back: Normal range of motion and neck supple.   Skin:     General: Skin is warm and dry.   Neurological:      General: No focal deficit present.      Mental Status: He is alert and oriented to person, place, and time.   Psychiatric:         Mood and Affect: Mood normal.         Behavior: Behavior normal.       Laboratory:  Recent Labs     12/28/24  1152 12/28/24 1942   WBC 24.1* 28.4*   RBC 4.24* 3.90*   HEMOGLOBIN 12.8* 11.9*   HEMATOCRIT 40.8* 36.1*   MCV 96.2 92.6   MCH 30.2 30.5   MCHC 31.4* 33.0   RDW 43.6 41.8   PLATELETCT 245 205   MPV 9.1 8.6*     Recent Labs     12/28/24 1942   SODIUM 134*   POTASSIUM 3.8   CHLORIDE 101   CO2 21   GLUCOSE 149*   BUN 19   CREATININE 0.76   CALCIUM 9.1     Recent Labs     12/28/24 1942   ALTSGPT 93*   ASTSGOT 84*   ALKPHOSPHAT 123*   TBILIRUBIN 1.4   GLUCOSE  "149*         No results for input(s): \"NTPROBNP\" in the last 72 hours.      No results for input(s): \"TROPONINT\" in the last 72 hours.    Imaging:  CT-ABDOMEN-PELVIS WITH   Final Result   Addendum (preliminary) 1 of 1   These findings were discussed with JAYDON BEYER on 12/28/2024    9:12 PM.      Final      1.  Large irregular heterogeneous low-attenuation lesion in the RIGHT lobe liver superiorly measuring 5.6 cm, most suggestive of abscess.   2.  Prominent pericaval, portacaval and debora hepatis lymph nodes, inflammatory versus neoplastic.   3.  Mild RIGHT hydronephrosis without obstructing ureteral stone demonstrated.   4.  Small nonobstructing LEFT kidney stone.      DX-CHEST-PORTABLE (1 VIEW)   Final Result      1.  No acute cardiopulmonary disease.   2.  Probable RIGHT lung base Nipple shadows.  Consider confirmation with nonemergent followup chest x-ray with nipple markers and shallow oblique views.             X-Ray:  I have personally reviewed the images and compared with prior images.    Assessment/Plan:  Justification for Admission Status  I anticipate this patient will require at least two midnights for appropriate medical management, necessitating inpatient admission because liver abscess    Patient will need a Med/Surg bed on MEDICAL service .  The need is secondary to liver abscess    * Liver abscess- (present on admission)  Assessment & Plan  69-year-old male with chills and fever for 3 days with Tmax 103, leukocytosis 24K and parietal lobe of the liver abscess 5.6 cm  Plan: Ordered CT-guided drain placement by IR, Gram and culture  Continue IV Zosyn, follow-up with blood culture, abscess gram/culture when available  Tylenol for fever as needed    Elevated LFTs  Assessment & Plan  ALT 93, AST 84, alkaline phosphatase 123  Presumed secondary to liver abscess  Monitor    Hydronephrosis of right kidney  Assessment & Plan  Incidental finding on CT of the abdomen with contrast: Mild right " hydronephrosis without obstructing ureteral stone.    Dyslipidemia- (present on admission)  Assessment & Plan  Will hold Lipitor due to elevated LFTs    Non-Hodgkin's lymphoma (HCC)- (present on admission)  Assessment & Plan  Per oncology note is low-grade, not on active treatments.  Continue  surveillance per Dr. Chen        VTE prophylaxis: enoxaparin ppx

## 2024-12-29 NOTE — ED NOTES
Med rec updated and complete. Allergies reviewed.    Pt confirmed name and date of birth.    No antibiotic use in last 30 days.    Pt takes Acyclovir 800 mg in the evening, not BID.    No anticoagulant medications.    Preferred pharmacy  [facility]  Long Term = [phone]  Short term   [facility] = [phone]

## 2024-12-29 NOTE — ASSESSMENT & PLAN NOTE
Patient admitted with liver abscess.  History of non-Hodgkin lymphoma.  Age of 69.  I discussed the CODE STATUS with him and his  at bedside, including CPR, intubation/mechanical ventilation.  He wants to stay full code if there is no terminal diagnosis or mental inability.  Continue full code.  ACP 16 minutes.  Above per previous hospitalist

## 2024-12-29 NOTE — OR SURGEON
Immediate Post- Operative Note        Findings: Hepatic fluid collection, suspected abscess.       Procedure(s): CT guided drain placement.      Estimated Blood Loss: Less than 5 ml        Complications: None            12/29/2024     1226 PM     Moshe Chandler M.D.

## 2024-12-29 NOTE — ED TRIAGE NOTES
Jet Saucedo [facility]  69 y.o. male  Chief Complaint   Patient presents with    Sent from Urgent Care      wbc 24, fever, chills. Called patient who requested antibiotics however at this time there is no clear source of infection. He has a history of non-Hodgkin's lymphoma and reports new lymphadenopathy. Further work up is needed as fevers ar on going with no known source of infection. Advised patient to go to  ED further evaluation; he verbalized understanding and intention to go to ED.    Fever     X 3 days now. Pt states taking advil for fever however it comes right back.     Urinary Frequency     Pt endorses urinary urgency  has gotten much worse the last 3/4 days        Pt amb to triage with steady gait for above complaint.   Pt is alert and oriented, speaking in full sentences, follows commands and responds appropriately to questions. Not in any apparent distress. Respirations are even and unlabored.  Pt placed in lobby. Pt educated on triage process. Pt encouraged to alert staff for any changes.

## 2024-12-29 NOTE — PROGRESS NOTES
[facility] Medicine Daily Progress Note    Date of Service  12/29/2024    Chief Complaint  Jet Webb is a 69 y.o. male admitted 12/28/2024 with liver abscess    Hospital Course  Jet Webb is a 69 y.o. male with history of non-Hodgkin lymphoma diagnosed 5 years ago, low-grade stage II, kidney stones, dyslipidemia who presented 12/28/2024 with complaints of chills, fever up to 103 at home since Thursday, frequent urination for 3 days.  No other complaints offered.  He was seen at urgent care and referred to ER for further evaluation.  Temperature 100.4, on room air, heart rate 101.  Lactic acid is not elevated.  WBC 28.4, platelets 205, hemoglobin 11.9.  Chemistry showed some AST 84, ALT 93 elevation.  Blood sugar 149.  CT of the abdomen pelvis with contrast: Right liver lobe lesion 5.6 cm most suggestive of abscess.  Prominent pericaval, portacaval and debora hepatis lymph nodes.  Mild right hydronephrosis without obstructing ureteral stones demonstrated.  Small left kidney stone, nonobstructing..  Chest x-ray: No acute process.  I discussed the plan of care with patient, bedside RN, and ERP .    Interval Problem Update  I have seen and examined the patient at bedside  I discussed and updated the care plan with his  at the bedside    Fever 102.4  Denies nausea vomiting, diarrhea, abdominal pain.  Mild pressure tenderness of the RUQ area.   Continue Zosyn    Plan for IR drain today    Wbc 28>21  AST/ALT 93/105  Bili 1.4>1.7    I have discussed this patient's plan of care and discharge plan at IDT rounds today with Case Management, Nursing, Nursing leadership, and other members of the IDT team.    Consultants/Specialty  IR    Code Status  Full Code    Disposition  The patient is not medically cleared for discharge to home or a post-acute facility.      I have placed the appropriate orders for post-discharge needs.    Review of Systems   All 12 systems were reviewed and negative except as  mentioned above      Physical Exam  Temp:  [36.4 °C (97.6 °F)-39.1 °C (102.4 °F)] 38.2 °C (100.8 °F)  Pulse:  [] 75  Resp:  [5-21] 15  BP: ()/(50-71) 106/55  SpO2:  [93 %-99 %] 98 %    Physical Exam  Constitutional:       General: He is in acute distress.      Appearance: He is ill-appearing.   HENT:      Head: Normocephalic.      Right Ear: Tympanic membrane normal.      Mouth/Throat:      Mouth: Mucous membranes are moist.   Eyes:      Extraocular Movements: Extraocular movements intact.      Conjunctiva/sclera: Conjunctivae normal.   Cardiovascular:      Rate and Rhythm: Normal rate and regular rhythm.      Pulses: Normal pulses.      Heart sounds: Normal heart sounds.   Pulmonary:      Effort: Pulmonary effort is normal.      Breath sounds: Normal breath sounds.   Abdominal:      General: Bowel sounds are normal.      Palpations: Abdomen is soft.      Tenderness: There is abdominal tenderness.   Musculoskeletal:         General: No swelling or tenderness.      Cervical back: Normal range of motion and neck supple.   Skin:     General: Skin is warm.   Neurological:      General: No focal deficit present.      Mental Status: He is alert and oriented to person, place, and time.   Psychiatric:         Mood and Affect: Mood normal.         Fluids    Intake/Output Summary (Last 24 hours) at 12/29/2024 1225  Last data filed at 12/29/2024 1000  Gross per 24 hour   Intake --   Output 500 ml   Net -500 ml        Laboratory  Recent Labs     12/28/24  1152 12/28/24 1942 12/29/24  0048   WBC 24.1* 28.4* 21.2*   RBC 4.24* 3.90* 3.58*   HEMOGLOBIN 12.8* 11.9* 11.1*   HEMATOCRIT 40.8* 36.1* 33.1*   MCV 96.2 92.6 92.5   MCH 30.2 30.5 31.0   MCHC 31.4* 33.0 33.5   RDW 43.6 41.8 41.8   PLATELETCT 245 205 179   MPV 9.1 8.6* 9.0     Recent Labs     12/28/24 1942 12/29/24 0048   SODIUM 134* 135   POTASSIUM 3.8 3.7   CHLORIDE 101 101   CO2 21 22   GLUCOSE 149* 136*   BUN 19 18   CREATININE 0.76 0.90   CALCIUM 9.1 8.9      Recent Labs     12/29/24  0048   INR 1.37*               Imaging  CT-ABDOMEN-PELVIS WITH   Final Result   Addendum (preliminary) 1 of 1   These findings were discussed with JAYDON BEYER on 12/28/2024    9:12 PM.      Final      1.  Large irregular heterogeneous low-attenuation lesion in the RIGHT lobe liver superiorly measuring 5.6 cm, most suggestive of abscess.   2.  Prominent pericaval, portacaval and debora hepatis lymph nodes, inflammatory versus neoplastic.   3.  Mild RIGHT hydronephrosis without obstructing ureteral stone demonstrated.   4.  Small nonobstructing LEFT kidney stone.      DX-CHEST-PORTABLE (1 VIEW)   Final Result      1.  No acute cardiopulmonary disease.   2.  Probable RIGHT lung base Nipple shadows.  Consider confirmation with nonemergent followup chest x-ray with nipple markers and shallow oblique views.         CT-DRAIN-LIVER ABSCESS-CYST    (Results Pending)        Assessment/Plan  * Liver abscess- (present on admission)  Assessment & Plan  69-year-old male with chills and fever for 3 days with Tmax 103, leukocytosis 24K and parietal lobe of the liver abscess 5.6 cm    Plan for IR drain today  Continue IV Zosyn  Follow-up blood culture  Follow-up drain culture  I ordered a.m. CBC CMP to monitor    Elevated LFTs  Assessment & Plan  ALT 93, AST 84, alkaline phosphatase 123  Presumed secondary to liver abscess    AST/ALT 93/105  Bili 1.4>1.7  I ordered acute hepatitis panel  I ordered a.m. CMP to monitor    Hydronephrosis of right kidney  Assessment & Plan  Incidental finding on CT of the abdomen with contrast: Mild right hydronephrosis without obstructing ureteral stone.    Dyslipidemia- (present on admission)  Assessment & Plan  Will hold Lipitor due to elevated LFTs    Non-Hodgkin's lymphoma (HCC)- (present on admission)  Assessment & Plan  In remission for 4-5 years   per oncology note is low-grade, not on active treatments.    Continue  surveillance per Dr. Chen    ACP  (advance care planning)  Assessment & Plan  Patient admitted with liver abscess.  History of non-Hodgkin lymphoma.  Age of 69.  I discussed the CODE STATUS with him and his  at bedside, including CPR, intubation/mechanical ventilation.  He wants to stay full code if there is no terminal diagnosis or mental inability.  Continue full code.  ACP 16 minutes.          VTE prophylaxis: Lovenox    I have performed a physical exam and reviewed and updated ROS and Plan today (12/29/2024). In review of yesterday's note (12/28/2024), there are no changes except as documented above.      I spent greater than 54 minutes for chart review, obtaining history independently, performing medically appropriate examination,  documenting , ordering medications, tests, or procedures, referring and communicating with other health care professionals, Independently interpreting results and communicating results with patient/family/caregiver. More than 50% of time was spent in face-to-face clinical encounter.

## 2024-12-29 NOTE — TELEPHONE ENCOUNTER
Telephone Encounter    Received notification from [facility] internal medicine answering service regarding patients recent urgent care visit lab results - wbc 24, fever, chills. Called patient who requested antibiotics however at this time there is no clear source of infection. He has a history of non-Hodgkin's lymphoma and reports new lymphadenopathy. Further work up is needed as fevers ar on going with no known source of infection. Advised patient to go to  ED further evaluation; he verbalized understanding and intention to go to ED.

## 2024-12-29 NOTE — CARE PLAN
The patient is Stable - Low risk of patient condition declining or worsening    Shift Goals  Clinical Goals: IV abx, NPO after midngiht  Patient Goals: sleep  Family Goals: ntiin    Patient A&Ox4, on room air. Patient complained of rigors, tylenol administered. Patient rigors calmed down. Patient bed alarm on, bed wheels locked, call light within reach. Hourly rounding completed. All needs met at this time.     Progress made toward(s) clinical / shift goals:        Problem: Knowledge Deficit - Standard  Goal: Patient and family/care givers will demonstrate understanding of plan of care, disease process/condition, diagnostic tests and medications  Outcome: Progressing     Problem: Pain - Standard  Goal: Alleviation of pain or a reduction in pain to the patient’s comfort goal  Outcome: Progressing     Problem: Fall Risk  Goal: Patient will remain free from falls  Outcome: Progressing

## 2024-12-29 NOTE — ASSESSMENT & PLAN NOTE
Incidental finding on CT of the abdomen with contrast: Mild right hydronephrosis without obstructing ureteral stone.

## 2024-12-29 NOTE — ED PROVIDER NOTES
ED Provider Note    CHIEF COMPLAINT  Chief Complaint   Patient presents with    Sent from Urgent Care      wbc 24, fever, chills. Called patient who requested antibiotics however at this time there is no clear source of infection. He has a history of non-Hodgkin's lymphoma and reports new lymphadenopathy. Further work up is needed as fevers ar on going with no known source of infection. Advised patient to go to  ED further evaluation; he verbalized understanding and intention to go to ED.    Fever     X 3 days now. Pt states taking advil for fever however it comes right back.     Urinary Frequency     Pt endorses urinary urgency  has gotten much worse the last 3/4 days        EXTERNAL RECORDS REVIEWED  Outpatient Notes most recent oncology note from earlier this month reviewed.  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  No recent chemotherapy.  Recently relocated here from [place].    HPI/ROS    LIMITATION TO HISTORY     OUTSIDE HISTORIAN(S):      Jet Webb is a 69 y.o. male who presents to the emergency department with chief complaint of leukocytosis and fever.  Patient was in urgent treatment center earlier where he was found to have a leukocytosis of 24,000 and having frequent chills.  Was urged to come to the emergency department for further evaluation and treatment.  Patient reports that he has been experiencing urinary urgency and dysuria over the last couple days.  He reports no stooling abnormalities he reports no abdominal pain he has had no sore throat no cough no chest pain no shortness of breath he does note that he is recently noticed some increased lymphadenopathy in his inguinal region bilaterally however states that he had this evaluated by his oncologist and that was minimal concern.    PAST MEDICAL HISTORY   has a past medical history of Cancer (HCC) and Lymphoma (HCC).    SURGICAL HISTORY   has a past surgical history that includes  "appendectomy and arthroplasty.    FAMILY HISTORY  Family History   Problem Relation Age of Onset    Heart Disease Father     Cancer Neg Hx        SOCIAL HISTORY  Social History     Tobacco Use    Smoking status: Never    Smokeless tobacco: Never   Vaping Use    Vaping status: Never Used   Substance and Sexual Activity    Alcohol use: Yes     Comment: ocassional    Drug use: Never    Sexual activity: Not on file       CURRENT MEDICATIONS  Home Medications    **Home medications have not yet been reviewed for this encounter**         ALLERGIES  No Known Allergies    PHYSICAL EXAM  VITAL SIGNS: /70   Pulse (!) 101   Temp 38 °C (100.4 °F) (Oral)   Resp 17   Ht 1.727 m (5' 8\")   Wt 74 kg (163 lb 2.3 oz)   SpO2 97%   BMI 24.81 kg/m²      Pulse ox interpretation: I interpret this pulse ox as normal.  Constitutional: Alert and oriented x 3, minimal distress  HEENT: Atraumatic normocephalic, pupils are equal round reactive to light extraocular movements are intact. The nares is clear, external ears are normal, mouth shows moist mucous membranes normal dentition for age  Neck: Supple, no JVD no tracheal deviation  Cardiovascular: Regular rate and rhythm no murmur rub or gallop 2+ pulses peripherally x4  Thorax & Lungs: No respiratory distress, no wheezes rales or rhonchi, No chest tenderness.   GI: Soft nontender nondistended positive bowel sounds, no peritoneal signs  Skin: Warm dry no acute rash or lesion  Musculoskeletal: Moving all extremities with full range and 5 of 5 strength no acute  deformity  Neurologic: Cranial nerves III through XII are grossly intact no sensory deficit no cerebellar dysfunction   Psychiatric: Appropriate affect for situation at this time      EKG/LABS  Results for orders placed or performed during the hospital encounter of 12/28/24   Lactic Acid    Collection Time: 12/28/24  7:42 PM   Result Value Ref Range    Lactic Acid 1.0 0.5 - 2.0 mmol/L   Complete Metabolic Panel    Collection " Time: 12/28/24  7:42 PM   Result Value Ref Range    Sodium 134 (L) 135 - 145 mmol/L    Potassium 3.8 3.6 - 5.5 mmol/L    Chloride 101 96 - 112 mmol/L    Co2 21 20 - 33 mmol/L    Anion Gap 12.0 7.0 - 16.0    Glucose 149 (H) 65 - 99 mg/dL    Bun 19 8 - 22 mg/dL    Creatinine 0.76 0.50 - 1.40 mg/dL    Calcium 9.1 8.5 - 10.5 mg/dL    Correct Calcium 9.3 8.5 - 10.5 mg/dL    AST(SGOT) 84 (H) 12 - 45 U/L    ALT(SGPT) 93 (H) 2 - 50 U/L    Alkaline Phosphatase 123 (H) 30 - 99 U/L    Total Bilirubin 1.4 0.1 - 1.5 mg/dL    Albumin 3.8 3.2 - 4.9 g/dL    Total Protein 6.9 6.0 - 8.2 g/dL    Globulin 3.1 1.9 - 3.5 g/dL    A-G Ratio 1.2 g/dL   CBC with Differential    Collection Time: 12/28/24  7:42 PM   Result Value Ref Range    WBC 28.4 (H) 4.8 - 10.8 K/uL    RBC 3.90 (L) 4.70 - 6.10 M/uL    Hemoglobin 11.9 (L) 14.0 - 18.0 g/dL    Hematocrit 36.1 (L) 42.0 - 52.0 %    MCV 92.6 81.4 - 97.8 fL    MCH 30.5 27.0 - 33.0 pg    MCHC 33.0 32.3 - 36.5 g/dL    RDW 41.8 35.9 - 50.0 fL    Platelet Count 205 164 - 446 K/uL    MPV 8.6 (L) 9.0 - 12.9 fL    Neutrophils-Polys 76.70 (H) 44.00 - 72.00 %    Lymphocytes 19.00 (L) 22.00 - 41.00 %    Monocytes 4.30 0.00 - 13.40 %    Eosinophils 0.00 0.00 - 6.90 %    Basophils 0.00 0.00 - 1.80 %    Nucleated RBC 0.00 0.00 - 0.20 /100 WBC    Neutrophils (Absolute) 21.78 (H) 1.82 - 7.42 K/uL    Lymphs (Absolute) 5.40 (H) 1.00 - 4.80 K/uL    Monos (Absolute) 1.22 (H) 0.00 - 0.85 K/uL    Eos (Absolute) 0.00 0.00 - 0.51 K/uL    Baso (Absolute) 0.00 0.00 - 0.12 K/uL    NRBC (Absolute) 0.00 K/uL   ESTIMATED GFR    Collection Time: 12/28/24  7:42 PM   Result Value Ref Range    GFR (CKD-EPI) 96 >60 mL/min/1.73 m 2   DIFFERENTIAL MANUAL    Collection Time: 12/28/24  7:42 PM   Result Value Ref Range    Manual Diff Status PERFORMED    PERIPHERAL SMEAR REVIEW    Collection Time: 12/28/24  7:42 PM   Result Value Ref Range    Peripheral Smear Review see below    PLATELET ESTIMATE    Collection Time: 12/28/24  7:42 PM    Result Value Ref Range    Plt Estimation Normal    MORPHOLOGY    Collection Time: 12/28/24  7:42 PM   Result Value Ref Range    RBC Morphology Normal    Urinalysis    Collection Time: 12/28/24  8:11 PM    Specimen: Urine   Result Value Ref Range    Color Yellow     Character Clear     Specific Gravity 1.008 <1.035    Ph 5.5 5.0 - 8.0    Glucose Negative Negative mg/dL    Ketones Negative Negative mg/dL    Protein Negative Negative mg/dL    Bilirubin Negative Negative    Urobilinogen, Urine 1.0 <=1.0 EU/dL    Nitrite Negative Negative    Leukocyte Esterase Negative Negative    Occult Blood Negative Negative    Micro Urine Req see below    POC CoV-2, FLU A/B, RSV by PCR    Collection Time: 12/28/24  8:19 PM   Result Value Ref Range    POC Influenza A RNA, PCR Negative Negative    POC Influenza B RNA, PCR Negative Negative    POC RSV, by PCR Negative Negative    POC SARS-CoV-2, PCR NotDetected NotDetected       I have independently interpreted this EKG    RADIOLOGY/PROCEDURES   I have independently interpreted the diagnostic imaging associated with this visit and am waiting the final reading from the radiologist.   My preliminary interpretation is as follows:   Multiple diverticula in the sigmoid colon with minimal stranding, abnormal rim-enhancing lesion in the liver    Radiologist interpretation:  CT-ABDOMEN-PELVIS WITH   Final Result   Addendum (preliminary) 1 of 1   These findings were discussed with JAYDON BEYER on 12/28/2024    9:12 PM.      Final      1.  Large irregular heterogeneous low-attenuation lesion in the RIGHT lobe liver superiorly measuring 5.6 cm, most suggestive of abscess.   2.  Prominent pericaval, portacaval and debora hepatis lymph nodes, inflammatory versus neoplastic.   3.  Mild RIGHT hydronephrosis without obstructing ureteral stone demonstrated.   4.  Small nonobstructing LEFT kidney stone.      DX-CHEST-PORTABLE (1 VIEW)   Final Result      1.  No acute cardiopulmonary disease.    2.  Probable RIGHT lung base Nipple shadows.  Consider confirmation with nonemergent followup chest x-ray with nipple markers and shallow oblique views.             COURSE & MEDICAL DECISION MAKING    ASSESSMENT, COURSE AND PLAN  Care Narrative: Pleasant 69-year-old male history of follicular lymphoma presents with fever tachycardia and dysuria.  Urine is negative however his CBC demonstrates a white count of 28,000 with a leftward shift.  Discussed with hematology/oncology , differential represents likely acute infection rather than acute lymphoma.  Patient feeling well otherwise his vital signs of normalized his lactic acid is normal.  He is having ongoing dysuria though however with negative urine therefore CT scan of the abdomen pelvis was obtained.  This does show some minimal inflammation as per my review in the sigmoid colon suggestive of early diverticulitis which would likely explain the patient's dysuria however more pertinently shows large abnormal low-attenuation heterogeneous lesion in the liver suggestive of possible liver abscess.  Patient reports no recent travel.  He was in [facility] in August of this year but no other travel abroad no abnormal food consumption recently.  He really denies any GI symptoms or right upper quadrant pain.  At this point patient will likely require liver biopsy/culture.  He is given a dose of Zosyn while in the emergency department will be admitted to the hospitalist service.  Discussed with the hospitalist  and admitted in guarded condition              FINAL DIAGNOSIS  1. Liver abscess Active   2. Leukocytosis, unspecified type Active   3.  Suspected early diverticulitis  4.  SIRS  5.  Minor transaminitis     Electronically signed by: Sam Roe M.D.

## 2024-12-29 NOTE — PROGRESS NOTES
4 Eyes Skin Assessment Completed by AGNES Waters and AGNES Almaguer.    Head WDL  Ears Redness and Blanching  Nose WDL  Mouth WDL  Neck WDL  Breast/Chest WDL  Shoulder Blades WDL  Spine WDL  (R) Arm/Elbow/Hand Blanching, pink  (L) Arm/Elbow/Hand Blanching, pink  Abdomen WDL  Groin WDL  Scrotum/Coccyx/Buttocks Redness and Blanching  (R) Leg Redness and Blanching  (L) Leg Redness and Blanching  (R) Heel/Foot/Toe, cracked and dry. White spots on foot  (L) Heel/Foot/Toe cracked and dry. White spots on foot                                Devices In Places PIV       Interventions In Place Gray ear foams on glasses, sacral mepilex, pillows    Possible Skin Injury No    Pictures Uploaded Into Epic Yes  Wound Consult Placed N/A  RN Wound Prevention Protocol Ordered No

## 2024-12-29 NOTE — PROGRESS NOTES
Pt presents to CT4. Pt was consented by MD at bedside, confirmed by this RN and consent at bedside. Pt transferred to CT table in supine position. Patient underwent a liver abscess drain placement by Dr. Chandler. Procedure site was marked by MD and verified using imaging guidance. Pt placed on monitor, prepped and draped in a sterile fashion. Vitals were taken every 5 minutes and remained stable during procedure (see doc flow sheet for results). CO2 waveform capnography was monitored and remained WNL throughout procedure. Report called to primary RN. Pt transported by stretcher with RN to [id number].     Specimen: 35ml of abscess fluid removed. 25ml  hand delivered to lab and 10ml discarded.    Swish  Flexima APDL Drainage Catheter  12F x 25cm  REF: [id number]  LOT: [id number]  EXP: 11/04/2027   Placed 12/29/24 @ 1215

## 2024-12-29 NOTE — ASSESSMENT & PLAN NOTE
CT of the abdomen pelvis with contrast: Right liver lobe lesion 5.6 cm most suggestive of abscess.  S/p drain placement 12/29  Cultures growing strep intermedius  - Discontinue IV Zosyn  - Start IV Unasyn  - Following cultures; NGTD  - ID following  - Repeat CMP in a.m.  - Continue drain management per IR recommendations  - Repeat CT A/P 1/3

## 2024-12-29 NOTE — CARE PLAN
The patient is Watcher - Medium risk of patient condition declining or worsening    Shift Goals  Clinical Goals: Pt will have all needs met prior to IR  Patient Goals: IR  Family Goals: Updates    Progress made toward(s) clinical / shift goals:  Communication with IR and MD prior to procedure, all needs were met and post op vitals started after procedure with drain monitored and cared for.     Patient is not progressing towards the following goals: progressing.

## 2024-12-30 LAB
ALBUMIN SERPL BCP-MCNC: 3.2 G/DL (ref 3.2–4.9)
ALBUMIN/GLOB SERPL: 1.1 G/DL
ALP SERPL-CCNC: 126 U/L (ref 30–99)
ALT SERPL-CCNC: 159 U/L (ref 2–50)
ANION GAP SERPL CALC-SCNC: 12 MMOL/L (ref 7–16)
AST SERPL-CCNC: 125 U/L (ref 12–45)
BACTERIA UR CULT: NORMAL
BACTERIA UR CULT: NORMAL
BILIRUB SERPL-MCNC: 1.1 MG/DL (ref 0.1–1.5)
BUN SERPL-MCNC: 21 MG/DL (ref 8–22)
CALCIUM ALBUM COR SERPL-MCNC: 9.4 MG/DL (ref 8.5–10.5)
CALCIUM SERPL-MCNC: 8.8 MG/DL (ref 8.5–10.5)
CHLORIDE SERPL-SCNC: 101 MMOL/L (ref 96–112)
CO2 SERPL-SCNC: 22 MMOL/L (ref 20–33)
CREAT SERPL-MCNC: 0.84 MG/DL (ref 0.5–1.4)
ERYTHROCYTE [DISTWIDTH] IN BLOOD BY AUTOMATED COUNT: 42.5 FL (ref 35.9–50)
GFR SERPLBLD CREATININE-BSD FMLA CKD-EPI: 93 ML/MIN/1.73 M 2
GLOBULIN SER CALC-MCNC: 2.9 G/DL (ref 1.9–3.5)
GLUCOSE SERPL-MCNC: 150 MG/DL (ref 65–99)
HAV IGM SERPL QL IA: NONREACTIVE
HBV CORE IGM SER QL: NONREACTIVE
HBV SURFACE AG SER QL: NONREACTIVE
HCT VFR BLD AUTO: 33.9 % (ref 42–52)
HCV AB SER QL: NONREACTIVE
HGB BLD-MCNC: 11.5 G/DL (ref 14–18)
INR BLD: 1.2
MAGNESIUM SERPL-MCNC: 2.2 MG/DL (ref 1.5–2.5)
MCH RBC QN AUTO: 31.6 PG (ref 27–33)
MCHC RBC AUTO-ENTMCNC: 33.9 G/DL (ref 32.3–36.5)
MCV RBC AUTO: 93.1 FL (ref 81.4–97.8)
PHOSPHATE SERPL-MCNC: 2.8 MG/DL (ref 2.5–4.5)
PLATELET # BLD AUTO: 197 K/UL (ref 164–446)
PMV BLD AUTO: 9.3 FL (ref 9–12.9)
POTASSIUM SERPL-SCNC: 3.5 MMOL/L (ref 3.6–5.5)
PROT SERPL-MCNC: 6.1 G/DL (ref 6–8.2)
RBC # BLD AUTO: 3.64 M/UL (ref 4.7–6.1)
SIGNIFICANT IND 70042: NORMAL
SIGNIFICANT IND 70042: NORMAL
SITE SITE: NORMAL
SITE SITE: NORMAL
SODIUM SERPL-SCNC: 135 MMOL/L (ref 135–145)
SOURCE SOURCE: NORMAL
SOURCE SOURCE: NORMAL
WBC # BLD AUTO: 20.8 K/UL (ref 4.8–10.8)

## 2024-12-30 PROCEDURE — 700101 HCHG RX REV CODE 250: Performed by: NURSE PRACTITIONER

## 2024-12-30 PROCEDURE — A9270 NON-COVERED ITEM OR SERVICE: HCPCS | Performed by: STUDENT IN AN ORGANIZED HEALTH CARE EDUCATION/TRAINING PROGRAM

## 2024-12-30 PROCEDURE — 700111 HCHG RX REV CODE 636 W/ 250 OVERRIDE (IP): Mod: JZ | Performed by: INTERNAL MEDICINE

## 2024-12-30 PROCEDURE — 700102 HCHG RX REV CODE 250 W/ 637 OVERRIDE(OP): Performed by: INTERNAL MEDICINE

## 2024-12-30 PROCEDURE — 770006 HCHG ROOM/CARE - MED/SURG/GYN SEMI*

## 2024-12-30 PROCEDURE — 83735 ASSAY OF MAGNESIUM: CPT

## 2024-12-30 PROCEDURE — 85027 COMPLETE CBC AUTOMATED: CPT

## 2024-12-30 PROCEDURE — A9270 NON-COVERED ITEM OR SERVICE: HCPCS | Performed by: INTERNAL MEDICINE

## 2024-12-30 PROCEDURE — 99232 SBSQ HOSP IP/OBS MODERATE 35: CPT | Performed by: STUDENT IN AN ORGANIZED HEALTH CARE EDUCATION/TRAINING PROGRAM

## 2024-12-30 PROCEDURE — 80053 COMPREHEN METABOLIC PANEL: CPT

## 2024-12-30 PROCEDURE — 700105 HCHG RX REV CODE 258: Performed by: INTERNAL MEDICINE

## 2024-12-30 PROCEDURE — 99223 1ST HOSP IP/OBS HIGH 75: CPT | Performed by: INTERNAL MEDICINE

## 2024-12-30 PROCEDURE — 36415 COLL VENOUS BLD VENIPUNCTURE: CPT

## 2024-12-30 PROCEDURE — 84100 ASSAY OF PHOSPHORUS: CPT

## 2024-12-30 PROCEDURE — 700102 HCHG RX REV CODE 250 W/ 637 OVERRIDE(OP): Performed by: STUDENT IN AN ORGANIZED HEALTH CARE EDUCATION/TRAINING PROGRAM

## 2024-12-30 PROCEDURE — 80074 ACUTE HEPATITIS PANEL: CPT

## 2024-12-30 RX ORDER — IBUPROFEN 400 MG/1
400 TABLET, FILM COATED ORAL EVERY 6 HOURS PRN
Status: DISCONTINUED | OUTPATIENT
Start: 2024-12-30 | End: 2025-01-10 | Stop reason: HOSPADM

## 2024-12-30 RX ORDER — SODIUM CHLORIDE 0.9 % (FLUSH) 0.9 %
10 SYRINGE (ML) INJECTION 2 TIMES DAILY
Status: DISCONTINUED | OUTPATIENT
Start: 2024-12-30 | End: 2025-01-10 | Stop reason: HOSPADM

## 2024-12-30 RX ORDER — POTASSIUM CHLORIDE 1500 MG/1
40 TABLET, EXTENDED RELEASE ORAL ONCE
Status: COMPLETED | OUTPATIENT
Start: 2024-12-30 | End: 2024-12-30

## 2024-12-30 RX ADMIN — PIPERACILLIN AND TAZOBACTAM 3.38 G: 3; .375 INJECTION, POWDER, FOR SOLUTION INTRAVENOUS at 14:46

## 2024-12-30 RX ADMIN — SENNOSIDES AND DOCUSATE SODIUM 2 TABLET: 50; 8.6 TABLET ORAL at 17:53

## 2024-12-30 RX ADMIN — ENOXAPARIN SODIUM 40 MG: 100 INJECTION SUBCUTANEOUS at 17:53

## 2024-12-30 RX ADMIN — IBUPROFEN 400 MG: 400 TABLET, FILM COATED ORAL at 21:52

## 2024-12-30 RX ADMIN — POTASSIUM CHLORIDE 40 MEQ: 1500 TABLET, EXTENDED RELEASE ORAL at 09:31

## 2024-12-30 RX ADMIN — MONTELUKAST 10 MG: 10 TABLET, FILM COATED ORAL at 17:53

## 2024-12-30 RX ADMIN — ACYCLOVIR 800 MG: 800 TABLET ORAL at 21:45

## 2024-12-30 RX ADMIN — ACYCLOVIR 800 MG: 800 TABLET ORAL at 09:32

## 2024-12-30 RX ADMIN — IBUPROFEN 400 MG: 400 TABLET, FILM COATED ORAL at 09:31

## 2024-12-30 RX ADMIN — AMPICILLIN AND SULBACTAM 3 G: 1; 2 INJECTION, POWDER, FOR SOLUTION INTRAMUSCULAR; INTRAVENOUS at 17:53

## 2024-12-30 RX ADMIN — PIPERACILLIN AND TAZOBACTAM 4.5 G: 4; .5 INJECTION, POWDER, FOR SOLUTION INTRAVENOUS at 05:42

## 2024-12-30 RX ADMIN — SODIUM CHLORIDE, PRESERVATIVE FREE 10 ML: 5 INJECTION INTRAVENOUS at 17:53

## 2024-12-30 ASSESSMENT — ENCOUNTER SYMPTOMS
ABDOMINAL PAIN: 1
FEVER: 0
PALPITATIONS: 0
SHORTNESS OF BREATH: 0
NAUSEA: 0
CHILLS: 0
WEAKNESS: 0
VOMITING: 0

## 2024-12-30 ASSESSMENT — PAIN DESCRIPTION - PAIN TYPE
TYPE: ACUTE PAIN

## 2024-12-30 NOTE — PROGRESS NOTES
Notification to MD pt returned from IR and inquiry if he can have diet orders, states for this RN to place pt with regular diet.

## 2024-12-30 NOTE — PROGRESS NOTES
Radiology Progress Note   Author: HEATHER Osorio Date & Time created: 12/30/2024  10:36 AM   Date of admission  12/28/2024  Note to reader: this note follows the APSO format rather than the historical SOAP format. Assessment and plan located at the top of the note for ease of use.    Chief Complaint  69 y.o. male admitted 12/28/2024 with   Chief Complaint   Patient presents with    Sent from Urgent Care      wbc 24, fever, chills. Called patient who requested antibiotics however at this time there is no clear source of infection. He has a history of non-Hodgkin's lymphoma and reports new lymphadenopathy. Further work up is needed as fevers ar on going with no known source of infection. Advised patient to go to  ED further evaluation; he verbalized understanding and intention to go to ED.    Fever     X 3 days now. Pt states taking advil for fever however it comes right back.     Urinary Frequency     Pt endorses urinary urgency  has gotten much worse the last 3/4 days          HPI  69-year-old male with past medical history significant for non-Hodgkin's lymphoma, nephrolithiasis, HLD admitted 12/28/2024 for CT finding of right hepatic fluid collection concerning for abscess after presenting to the ER with fevers and chills.  IR was consulted and patient underwent CT-guided liver abscess drain placement with IR Dr. Chandler on 12/29/2024    Interval History:   12/30/2024 -hepatic abscess 12F drain to RUQ with 140 mL slightly turbid serosanguinous output in the last 24 hours. Labs reviewed; WBC 20.8, H&H 11.5/33.9, , , alk phos 126, creatinine 0.84.  Hepatic fluid cultures pending. Drain flushed with 10 mL NS. I reviewed IDT notes and images.    Assessment/Plan     Principal Problem:    Liver abscess  Active Problems:    Non-Hodgkin's lymphoma (HCC)    Dyslipidemia    Hydronephrosis of right kidney    Elevated LFTs    ACP (advance care planning)      Plan IR  - Order placed to irrigate RUQ  drain with 10 ml of sterile saline each shift  - Fluid cultures pending   - ID following   - Recommend follow up CT scan in 5-7 days (01/03/25)  - Continue to monitor drains, VS, and labs    -  -Thank you for allowing Interventional Radiology team to participate in the patients care, if any additional care or requests are needed in the future please do not hesitate to call or place IR order           Review of Systems  Physical Exam   Review of Systems   Constitutional:  Negative for chills and fever.   Respiratory:  Negative for shortness of breath.    Cardiovascular:  Negative for chest pain and palpitations.   Gastrointestinal:  Positive for abdominal pain. Negative for nausea and vomiting.   Neurological:  Negative for weakness.      Vitals:    12/30/24 0728   BP: 137/79   Pulse: 86   Resp: 16   Temp: 37.1 °C (98.8 °F)   SpO2: 90%        Physical Exam  Cardiovascular:      Rate and Rhythm: Normal rate.   Pulmonary:      Effort: Pulmonary effort is normal.   Abdominal:      General: There is no distension.      Tenderness: There is abdominal tenderness.      Comments: IR drain to RUQ   Skin:     General: Skin is warm and dry.   Neurological:      General: No focal deficit present.      Mental Status: He is alert and oriented to person, place, and time.   Psychiatric:         Mood and Affect: Mood normal.         Behavior: Behavior normal.             Labs    Recent Labs     12/28/24 1942 12/29/24 0048 12/30/24  0158   WBC 28.4* 21.2* 20.8*   RBC 3.90* 3.58* 3.64*   HEMOGLOBIN 11.9* 11.1* 11.5*   HEMATOCRIT 36.1* 33.1* 33.9*   MCV 92.6 92.5 93.1   MCH 30.5 31.0 31.6   MCHC 33.0 33.5 33.9   RDW 41.8 41.8 42.5   PLATELETCT 205 179 197   MPV 8.6* 9.0 9.3     Recent Labs     12/28/24 1942 12/29/24 0048 12/30/24  0158   SODIUM 134* 135 135   POTASSIUM 3.8 3.7 3.5*   CHLORIDE 101 101 101   CO2 21 22 22   GLUCOSE 149* 136* 150*   BUN 19 18 21   CREATININE 0.76 0.90 0.84   CALCIUM 9.1 8.9 8.8     Recent Labs      "12/28/24  1942 12/29/24  0048 12/30/24  0158   ALBUMIN 3.8 3.4 3.2   TBILIRUBIN 1.4 1.7* 1.1   ALKPHOSPHAT 123* 122* 126*   TOTPROTEIN 6.9 6.3 6.1   ALTSGPT 93* 105* 159*   ASTSGOT 84* 93* 125*   CREATININE 0.76 0.90 0.84     CT-DRAIN-LIVER ABSCESS-CYST   Final Result      1.  CT guided placement of a percutaneous drainage catheter in a right hepatic abscess.   2.  The current plan is to obtain a follow-up CT in 5-7 days..      CT-ABDOMEN-PELVIS WITH   Final Result   Addendum (preliminary) 1 of 1   These findings were discussed with JAYDON BEYER on 12/28/2024    9:12 PM.      Final      1.  Large irregular heterogeneous low-attenuation lesion in the RIGHT lobe liver superiorly measuring 5.6 cm, most suggestive of abscess.   2.  Prominent pericaval, portacaval and debora hepatis lymph nodes, inflammatory versus neoplastic.   3.  Mild RIGHT hydronephrosis without obstructing ureteral stone demonstrated.   4.  Small nonobstructing LEFT kidney stone.      DX-CHEST-PORTABLE (1 VIEW)   Final Result      1.  No acute cardiopulmonary disease.   2.  Probable RIGHT lung base Nipple shadows.  Consider confirmation with nonemergent followup chest x-ray with nipple markers and shallow oblique views.           INR   Date Value Ref Range Status   12/29/2024 1.37 (H) 0.87 - 1.13 Final     Comment:     INR - Non-therapeutic Reference Range: 0.87-1.13  INR - Therapeutic Reference Range: 2.0-4.0       No results found for: \"POCINR\"     Intake/Output Summary (Last 24 hours) at 12/30/2024 1036  Last data filed at 12/30/2024 0948  Gross per 24 hour   Intake 3080 ml   Output 1690 ml   Net 1390 ml      I have personally reviewed the above labs and imaging      I have performed a physical exam and reviewed and updated ROS and Plan today (12/30/2024).     49 minutes in directly providing and coordinating care and extensive data review.  No time overlap and excludes procedures.   "

## 2024-12-30 NOTE — PROGRESS NOTES
Notification to Kate Sales MD, pt getting tylenol for temp of 102.4, pt c/o rigors over night. No new orders at this time, will continue with current POC.

## 2024-12-30 NOTE — DISCHARGE PLANNING
TCN following. HTH/SCP chart reviewed. Collaborated with KELLY Mtz. No new TCN needs identified at this time noting choice proactively obtained for HH and IV infusion if needed in discharge planning. Per review, noted patient followed by ID and IR.    TCN will continue to follow and collaborate with discharge planning team as additional post acute needs arise. Thank you.      Completed today:  Choice obtained: HH (1. [facility], 2. [facility]) & Infusion ([facility]).   Pt aware of [facility] blanket referral policy  SCP with [facility] PCP. Discussed possible outpatient transitional PCP follow up if indicated and pt in agreement. In review of AVS, patient is scheduled for Hospital Follow Up with Resident Kaye Owens D.O. Monday Jan 6, 2025 3:00 PM

## 2024-12-30 NOTE — PROGRESS NOTES
Pt blood culture from 12/28 is positive for gram positive cocci possibly streptococcus communicated to Dakotah Perez, Hospitalist.

## 2024-12-30 NOTE — CARE PLAN
The patient is Stable - Low risk of patient condition declining or worsening    Shift Goals  Clinical Goals: IV abx, safety, Monitor VS  Patient Goals: rest  Family Goals: nitin    Progress made toward(s) clinical / shift goals:  Patient reports pain is improved and does not report any concerns at this time. Patient remains on IV abx and tolerating well. Ambulated to BR with steady gait and no assistance required. All needs attended. Bed to lowest position and call light in reach.     Patient is not progressing towards the following goals: N/A

## 2024-12-30 NOTE — CARE PLAN
The patient is Watcher - Medium risk of patient condition declining or worsening    Shift Goals  Clinical Goals: Pt will use IS at least 10 times every hour during waking hours.  Patient Goals: breathe easier, discomfort with breathing after DANISHA drain.  Family Goals: Updates    Progress made toward(s) clinical / shift goals:  Pt using IS, self motivated and effective. Ibuprofen to assist with discomfort with deep breathing. Pt did use IS at least 10 times every hour during waking hours.    Patient is not progressing towards the following goals: progressing.

## 2024-12-30 NOTE — DISCHARGE PLANNING
HTH/SCP TCN chart review completed. Collaborated with AMERICA Conde prior to meeting with the pt. The most current review of medical record, knowledge of pt's PLOF and social support, LACE+ score of 67, 6 clicks scores of 22 ADL's and 20 mobility were considered.  Per chart review, patient sent to ED from  with fever, urinary frequency, fever and WBC 24. Hx of non-Hodgkin's lymphoma and reports new lymphadenopathy.  Per Kardex, patient ambulating 448 feet no AD.  Per chart review, patient currently goes to outpatient PT.      Pt seen at bedside. Introduced TCN program. Provided education regarding post acute levels of care. Education provided regarding case management policy for blanket SNF referrals. Discussed HTH/SCP plan benefits. Pt verbalizes understanding.     Patient states he resides with his spouse Raymond in a one story home and was independent with ADL's, IADL's and mobility (no AD) at his baseline level of function.  He is on RA.  Spouse can provide transportation home at discharge.  He has no functional concerns with discharge to home when medically cleared.  He is agreeable with HH and Home infusions if needed at discharge for IV ABX.      Choice proactively obtained for HH (1. [facility], 2. [facility]) & Infusion ([facility]), faxed to DPA and CM notified.  In collaboration with AMERICA, current discharge considerations are developing.  TCN will continue to follow and collaborate with discharge planning team as additional post acute needs arise. Thank you.     Completed today:  Choice obtained: HH (1. [facility], 2. [facility]) & Infusion ([facility]).   Pt aware of [facility] blanket referral policy  SCP with [facility] PCP. Discussed possible outpatient transitional PCP follow up if indicated and pt in agreement; sent information to assist in scheduling.

## 2024-12-30 NOTE — PROGRESS NOTES
Communication with MD pt blood culture from 12/28 is positive for gram positive cocci possibly streptococcus, communicated from lab at [id number].

## 2024-12-30 NOTE — HOSPITAL COURSE
Jet Webb is a 69-year-old male with PMHx non-Hodgkin's lymphoma, nephrolithiasis, HLD.  Admitted 12/28 for right sided liver abscesses.    Initially, patient presenting with fever, chills. CT of the abdomen pelvis with contrast: Right liver lobe lesion 5.6 cm most suggestive of abscess. Prominent pericaval, portacaval and debora hepatis lymph nodes. Mild right hydronephrosis without obstructing ureteral stones demonstrated.     Patient was started on IV Zosyn.  IR was consulted.  Patient underwent CT-guided drainage on 12/29.

## 2024-12-30 NOTE — PROGRESS NOTES
[facility] Medicine Daily Progress Note    Date of Service  12/30/2024    Chief Complaint  Jet Webb is a 69 y.o. male admitted 12/28/2024 with fever and chills.    Hospital Course  Jet Webb is a 69-year-old male with PMHx non-Hodgkin's lymphoma, nephrolithiasis, HLD.  Admitted 12/28 for right sided liver abscesses.    Initially, patient presenting with fever, chills. CT of the abdomen pelvis with contrast: Right liver lobe lesion 5.6 cm most suggestive of abscess. Prominent pericaval, portacaval and debora hepatis lymph nodes. Mild right hydronephrosis without obstructing ureteral stones demonstrated.     Patient was started on IV Zosyn.  IR was consulted.  Patient underwent CT-guided drainage on 12/29.    Interval Problem Update  12/30: Vitals notable for Tmax 100.7.  Intermittent tachycardia.  SBP ranging 106 through 159.  DANISHA drain placed yesterday.  Wound cultures pending.    I have discussed this patient's plan of care and discharge plan at IDT rounds today with Case Management, Nursing, Nursing leadership, and other members of the IDT team.    Consultants/Specialty  infectious disease and IR    Code Status  Full Code    Disposition  The patient is not medically cleared for discharge to home or a post-acute facility.      I have placed the appropriate orders for post-discharge needs.    Review of Systems  Review of Systems   Constitutional:  Negative for fever and malaise/fatigue.   Respiratory:  Negative for shortness of breath.    Cardiovascular:  Negative for chest pain and leg swelling.   Gastrointestinal:  Positive for abdominal pain. Negative for nausea and vomiting.        Physical Exam  Temp:  [36.4 °C (97.5 °F)-38.2 °C (100.7 °F)] 37.1 °C (98.8 °F)  Pulse:  [] 86  Resp:  [5-23] 16  BP: ()/(50-85) 137/79  SpO2:  [90 %-99 %] 90 %    Physical Exam  Vitals and nursing note reviewed.   Constitutional:       General: He is not in acute distress.     Appearance: Normal appearance. He  is not ill-appearing.   Cardiovascular:      Rate and Rhythm: Normal rate and regular rhythm.   Pulmonary:      Effort: Pulmonary effort is normal.      Breath sounds: Normal breath sounds.   Abdominal:      General: Bowel sounds are normal. There is no distension.      Palpations: Abdomen is soft.      Tenderness: There is abdominal tenderness.      Comments: DANISHA drain to epigastric region.  Serosanguineous drainage present   Skin:     General: Skin is warm and dry.   Neurological:      Mental Status: He is alert and oriented to person, place, and time. Mental status is at baseline.   Psychiatric:         Mood and Affect: Mood normal.         Behavior: Behavior normal.         Fluids    Intake/Output Summary (Last 24 hours) at 12/30/2024 1023  Last data filed at 12/30/2024 0948  Gross per 24 hour   Intake 3080 ml   Output 1690 ml   Net 1390 ml        Laboratory  Recent Labs     12/28/24 1942 12/29/24  0048 12/30/24  0158   WBC 28.4* 21.2* 20.8*   RBC 3.90* 3.58* 3.64*   HEMOGLOBIN 11.9* 11.1* 11.5*   HEMATOCRIT 36.1* 33.1* 33.9*   MCV 92.6 92.5 93.1   MCH 30.5 31.0 31.6   MCHC 33.0 33.5 33.9   RDW 41.8 41.8 42.5   PLATELETCT 205 179 197   MPV 8.6* 9.0 9.3     Recent Labs     12/28/24 1942 12/29/24  0048 12/30/24  0158   SODIUM 134* 135 135   POTASSIUM 3.8 3.7 3.5*   CHLORIDE 101 101 101   CO2 21 22 22   GLUCOSE 149* 136* 150*   BUN 19 18 21   CREATININE 0.76 0.90 0.84   CALCIUM 9.1 8.9 8.8     Recent Labs     12/29/24  0048   INR 1.37*               Imaging  CT-DRAIN-LIVER ABSCESS-CYST   Final Result      1.  CT guided placement of a percutaneous drainage catheter in a right hepatic abscess.   2.  The current plan is to obtain a follow-up CT in 5-7 days..      CT-ABDOMEN-PELVIS WITH   Final Result   Addendum (preliminary) 1 of 1   These findings were discussed with JAYDON BEYER on 12/28/2024    9:12 PM.      Final      1.  Large irregular heterogeneous low-attenuation lesion in the RIGHT lobe liver  superiorly measuring 5.6 cm, most suggestive of abscess.   2.  Prominent pericaval, portacaval and debora hepatis lymph nodes, inflammatory versus neoplastic.   3.  Mild RIGHT hydronephrosis without obstructing ureteral stone demonstrated.   4.  Small nonobstructing LEFT kidney stone.      DX-CHEST-PORTABLE (1 VIEW)   Final Result      1.  No acute cardiopulmonary disease.   2.  Probable RIGHT lung base Nipple shadows.  Consider confirmation with nonemergent followup chest x-ray with nipple markers and shallow oblique views.              Assessment/Plan  * Liver abscess- (present on admission)  Assessment & Plan  CT of the abdomen pelvis with contrast: Right liver lobe lesion 5.6 cm most suggestive of abscess.  S/p drain placement 12/29  - Continue IV Zosyn  - Following cultures; NGTD  - ID consult  - Repeat CMP in a.m.  - Continue drain management per IR recommendations    ACP (advance care planning)  Assessment & Plan  Patient admitted with liver abscess.  History of non-Hodgkin lymphoma.  Age of 69.  I discussed the CODE STATUS with him and his  at bedside, including CPR, intubation/mechanical ventilation.  He wants to stay full code if there is no terminal diagnosis or mental inability.  Continue full code.  ACP 16 minutes.     Elevated LFTs  Assessment & Plan  ; ;   Presumed secondary to liver abscess  Hepatitis panel negative  - Repeat CMP in a.m.    Hydronephrosis of right kidney  Assessment & Plan  Incidental finding on CT of the abdomen with contrast: Mild right hydronephrosis without obstructing ureteral stone.    Dyslipidemia- (present on admission)  Assessment & Plan  Will hold Lipitor due to elevated LFTs    Non-Hodgkin's lymphoma (HCC)- (present on admission)  Assessment & Plan  In remission for 4-5 years   per oncology note is low-grade, not on active treatments.    Continue  surveillance per Dr. Chen         VTE prophylaxis:   SCDs/TEDs   enoxaparin ppx      I have  performed a physical exam and reviewed and updated ROS and Plan today (12/30/2024). In review of yesterday's note (12/29/2024), there are no changes except as documented above.

## 2024-12-30 NOTE — CONSULTS
"INFECTIOUS DISEASES INPATIENT CONSULT NOTE     Date of Service:12/30/24    Consult Requested By: Janey Griffin M.D.    Reason for Consultation: liver abscess    History of Present Illness:   Jet Webb is a 69 y.o. male admitted 12/28/2024 for fever and chills due to above above  H/o NHL  From admit \" ... diagnosed 5 years ago, low-grade stage II, kidney stones, dyslipidemia who presented 12/28/2024 with complaints of chills, fever up to 103 at home since Thursday, frequent urination for 3 days.  No other complaints offered.  He was seen at urgent care and referred to ER for further evaluation.  Temperature 100.4, on room air, heart rate 101.  Lactic acid is not elevated.  WBC 28.4, platelets 20.5, hemoglobin 11.9.  Chemistry showed some AST 84, ALT 93 elevation.  Blood sugar 149.  CT of the abdomen pelvis with contrast: Right liver lobe lesion 5.6 cm most suggestive of abscess.  Prominent pericaval, portacaval and debora hepatis lymph nodes.  Mild right hydronephrosis without obstructing ureteral stones demonstrated.  Small left kidney stone, nonobstructing..  Chest x-ray: No acute process.\"  He was started on IV Zosyn. IR was consulted. Patient underwent CT-guided drainage on 12/29.Blood cultures +strep  Infectious Diseases consulted for antibiotic recommendation and management      Review Of Systems:  Fever resolved    PMH:   Past Medical History:   Diagnosis Date    Cancer (HCC)     Lymphoma (HCC)          PSH:  Past Surgical History:   Procedure Laterality Date    APPENDECTOMY      ARTHROPLASTY         FAMILY HX:  Family History   Problem Relation Age of Onset    Heart Disease Father     Cancer Neg Hx        SOCIAL HX:  Social History     Socioeconomic History    Marital status:      Spouse name: Not on file    Number of children: Not on file    Years of education: Not on file    Highest education level: Not on file   Occupational History    Not on file   Tobacco Use    Smoking status: " Never    Smokeless tobacco: Never   Vaping Use    Vaping status: Never Used   Substance and Sexual Activity    Alcohol use: Yes     Comment: ocassional    Drug use: Never    Sexual activity: Not on file   Other Topics Concern    Not on file   Social History Narrative     to his , recently moved from [place]. 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      Social Drivers of Health     Financial Resource Strain: Low Risk  (7/18/2024)    Overall Financial Resource Strain (CARDIA)     Difficulty of Paying Living Expenses: Not hard at all   Food Insecurity: No Food Insecurity (12/28/2024)    Hunger Vital Sign     Worried About Running Out of Food in the Last Year: Never true     Ran Out of Food in the Last Year: Never true   Transportation Needs: No Transportation Needs (12/28/2024)    PRAPARE - Transportation     Lack of Transportation (Medical): No     Lack of Transportation (Non-Medical): No   Physical Activity: Sufficiently Active (12/1/2023)    Received from [facility]    Exercise Vital Sign     Days of Exercise per Week: 7 days     Minutes of Exercise per Session: 30 min   Stress: No Stress Concern Present (12/1/2023)    Received from [facility]    [facility] - Occupational Stress Questionnaire     Feeling of Stress : Not at all   Social Connections: Moderately Integrated (12/1/2023)    Received from [facility]    Social Connection and Isolation Panel [NHANES]     Frequency of Communication with Friends and Family: More than three times a week     Frequency of Social Gatherings with Friends and Family: Three times a week     Attends Rastafarian Services: Never     Active Member of Clubs or Organizations: Yes     Attends Club or Organization Meetings: More than 4 times per year     Marital Status:    Intimate Partner Violence: Not At Risk (12/28/2024)    Humiliation, Afraid, Rape, and Kick questionnaire      Fear of Current or Ex-Partner: No     Emotionally Abused: No     Physically Abused: No     Sexually Abused: No   Housing Stability: Low Risk  (12/28/2024)    Housing Stability Vital Sign     Unable to Pay for Housing in the Last Year: No     Number of Times Moved in the Last Year: 0     Homeless in the Last Year: No     Social History     Tobacco Use   Smoking Status Never   Smokeless Tobacco Never     Social History     Substance and Sexual Activity   Alcohol Use Yes    Comment: ocassional       Allergies/Intolerances:  No Known Allergies      Other Current Medications:    Current Facility-Administered Medications:     ibuprofen (Motrin) tablet 400 mg, 400 mg, Oral, Q6HRS PRN, Janey Griffin M.D., 400 mg at 12/30/24 0931    [DISCONTINUED] piperacillin-tazobactam (Zosyn) 4.5 g in  mL IVPB, 4.5 g, Intravenous, Once **AND** piperacillin-tazobactam (Zosyn) 3.375 g in  mL IVPB, 3.375 g, Intravenous, Q8HRS, Anca Russo M.D.    enoxaparin (Lovenox) inj 40 mg, 40 mg, Subcutaneous, DAILY AT 1800, Michael Cruz M.D., 40 mg at 12/29/24 1723    acetaminophen (Tylenol) tablet 650 mg, 650 mg, Oral, Q6HRS PRN, Michael Cruz M.D., 650 mg at 12/29/24 1723    Notify provider if pain remains uncontrolled, , , CONTINUOUS **AND** Use the Numeric Rating Scale (NRS), Walsh-Baker Faces (WBF), or FLACC on regular floors and Critical-Care Pain Observation Tool (CPOT) on ICUs/Trauma to assess pain, , , CONTINUOUS **AND** Pulse Ox, , , CONTINUOUS **AND** Pharmacy Consult Request ...Pain Management Review 1 Each, 1 Each, Other, PHARMACY TO DOSE **AND** If patient difficult to arouse and/or has respiratory depression (respiratory rate of 10 or less), stop any opiates that are currently infusing and call a Rapid Response., , , CONTINUOUS, Michael Cruz M.D.    oxyCODONE immediate-release (Roxicodone) tablet 2.5 mg, 2.5 mg, Oral, Q3HRS PRN **OR** oxyCODONE immediate-release (Roxicodone) tablet 5 mg, 5 mg, Oral,  "Q3HRS PRN, 5 mg at 24 1319 **OR** morphine 4 MG/ML injection 2 mg, 2 mg, Intravenous, Q3HRS PRN, Michael Cruz M.D.    senna-docusate (Pericolace Or Senokot S) 8.6-50 MG per tablet 2 Tablet, 2 Tablet, Oral, Q EVENING **AND** polyethylene glycol/lytes (Miralax) Packet 1 Packet, 1 Packet, Oral, QDAY PRN, Michael Cruz M.D.    labetalol (Normodyne/Trandate) injection 10 mg, 10 mg, Intravenous, Q4HRS PRN, Michael Cruz M.D.    ondansetron (Zofran) syringe/vial injection 4 mg, 4 mg, Intravenous, Q4HRS PRN, Michael Cruz M.D., 4 mg at 24 1449    ondansetron (Zofran ODT) dispertab 4 mg, 4 mg, Oral, Q4HRS PRN, Michael Cruz M.D.    acyclovir (Zovirax) tablet 800 mg, 800 mg, Oral, BID, Michael Cruz M.D., 800 mg at 24 0932    montelukast (Singulair) tablet 10 mg, 10 mg, Oral, DAILY AT 1800, Michael Cruz M.D., 10 mg at 24 1723      Most Recent Vital Signs:  /79   Pulse 86   Temp 37.1 °C (98.8 °F) (Temporal)   Resp 16   Ht 1.727 m (5' 8\")   Wt 74.1 kg (163 lb 5.8 oz)   SpO2 90%   BMI 24.84 kg/m²   Temp  Av.6 °C (99.6 °F)  Min: 36.4 °C (97.5 °F)  Max: 39.1 °C (102.4 °F)    Physical Exam:  General:  no acute distress  Chest: unlabored.  Cardiac: RRR  Abdomen: + drain  Extremities: No cyanosis      Pertinent Lab Results:  Recent Labs     24  1942 24  0048 24  0158   WBC 28.4* 21.2* 20.8*      Recent Labs     24   HEMOGLOBIN 11.9* 11.1* 11.5*   HEMATOCRIT 36.1* 33.1* 33.9*   MCV 92.6 92.5 93.1   MCH 30.5 31.0 31.6   PLATELETCT 205 179 197         Recent Labs     24   SODIUM 134* 135 135   POTASSIUM 3.8 3.7 3.5*   CHLORIDE 101 101 101   CO2 21 22 22   CREATININE 0.76 0.90 0.84        Recent Labs     24   ALBUMIN 3.8 3.4 3.2        Pertinent Micro:  Results       Procedure Component Value Units Date/Time    Urine Culture " (New) [[id number]] Collected: 12/28/24 2011    Order Status: Completed Specimen: Urine Updated: 12/30/24 0714     Significant Indicator NEG     Source UR     Site -     Culture Result No growth at 48 hours.    Blood Culture - Draw one from central line and one from peripheral site [[id number]]  (Abnormal) Collected: 12/28/24 1942    Order Status: Completed Specimen: Blood from Line Updated: 12/29/24 2250     Significant Indicator POS     Source BLD     Site Peripheral     Culture Result Growth detected by automated blood culture system.  12/29/2024  22:49  Gram Stain: Gram positive cocci: Possible Streptococcus sp.      Blood Culture - Draw one from central line and one from peripheral site [[id number]]  (Abnormal) Collected: 12/28/24 1942    Order Status: Completed Specimen: Blood from Peripheral Updated: 12/29/24 1823     Significant Indicator POS     Source BLD     Site PERIPHERAL     Culture Result Growth detected by automated blood culture system. 12/29/2024  18:22  Gram Stain: Gram positive cocci: Possible Streptococcus sp.      GRAM STAIN [[id number]] Collected: 12/29/24 1215    Order Status: Completed Specimen: Wound Updated: 12/29/24 1812     Significant Indicator .     Source WND     Site Liver abscess     Gram Stain Result Many WBCs.  Many Gram positive cocci in chains.      CULTURE WOUND W/ GRAM STAIN [[id number]] Collected: 12/29/24 1215    Order Status: No result Specimen: Wound Updated: 12/29/24 1812     Significant Indicator NEG     Source WND     Site Liver abscess     Culture Result -     Gram Stain Result Many WBCs.  Many Gram positive cocci in chains.      FLUID CULTURE W/GRAM STAIN [[id number]]     Order Status: Canceled Specimen: Other Body Fluid     Urinalysis [[id number]] Collected: 12/28/24 2011    Order Status: Completed Specimen: Urine Updated: 12/28/24 2023     Color Yellow     Character Clear     Specific Gravity 1.008     Ph 5.5     Glucose Negative mg/dL      Ketones Negative mg/dL       "Protein Negative mg/dL      Bilirubin Negative     Urobilinogen, Urine 1.0 EU/dL      Nitrite Negative     Leukocyte Esterase Negative     Occult Blood Negative     Micro Urine Req see below     Comment: Microscopic examination not performed when specimen is clear  and chemically negative for protein, blood, leukocyte esterase  and nitrite.               No results found for: \"BLOODCULTU\", \"BLDCULT\", \"BCHOLD\"     Studies:  IMPRESSION:     1.  Large irregular heterogeneous low-attenuation lesion in the RIGHT lobe liver superiorly measuring 5.6 cm, most suggestive of abscess.  2.  Prominent pericaval, portacaval and debora hepatis lymph nodes, inflammatory versus neoplastic.  3.  Mild RIGHT hydronephrosis without obstructing ureteral stone demonstrated.  4.  Small nonobstructing LEFT kidney stone.  IMPRESSION:   Liver abscess  -s/p IR drain placement  Strep bacteremia  Leukocytosis    PLAN:   BCxs + strep  Repeat Bcxs every 48 hours until neg  TTE; CASANDRA if persistently positive blood cultures  At risk for enterococcus  Repeat CT scan 5-7 days from date of drain placement  Management drain per IR  Decrease Zosyn to standard dosing  Wound care  Final antibiotic recommendations per culture results and clinical course      Plan of care discussed with ALEJANDRO Griffin M.D.. Will continue to follow    Anca Russo M.D.    ADDENDUM:  Cultures in liver fluid strep intermedius (type of strep anginosis) Usual source is dental  Blood cult 12/28 strep sp., likely same organism  Can transition to Unasyn  "

## 2024-12-31 ENCOUNTER — APPOINTMENT (OUTPATIENT)
Dept: PHYSICAL THERAPY | Facility: REHABILITATION | Age: 69
End: 2024-12-31
Payer: MEDICARE

## 2024-12-31 VITALS
DIASTOLIC BLOOD PRESSURE: 71 MMHG | SYSTOLIC BLOOD PRESSURE: 137 MMHG | HEIGHT: 68 IN | WEIGHT: 163.36 LBS | TEMPERATURE: 99 F | BODY MASS INDEX: 24.76 KG/M2 | OXYGEN SATURATION: 93 % | RESPIRATION RATE: 18 BRPM | HEART RATE: 83 BPM

## 2024-12-31 LAB
ALBUMIN SERPL BCP-MCNC: 3.1 G/DL (ref 3.2–4.9)
ALBUMIN/GLOB SERPL: 1.1 G/DL
ALP SERPL-CCNC: 125 U/L (ref 30–99)
ALT SERPL-CCNC: 109 U/L (ref 2–50)
ANION GAP SERPL CALC-SCNC: 12 MMOL/L (ref 7–16)
AST SERPL-CCNC: 52 U/L (ref 12–45)
BACTERIA BLD CULT: ABNORMAL
BACTERIA WND AEROBE CULT: ABNORMAL
BACTERIA WND AEROBE CULT: ABNORMAL
BILIRUB SERPL-MCNC: 0.6 MG/DL (ref 0.1–1.5)
BUN SERPL-MCNC: 22 MG/DL (ref 8–22)
CALCIUM ALBUM COR SERPL-MCNC: 9.1 MG/DL (ref 8.5–10.5)
CALCIUM SERPL-MCNC: 8.4 MG/DL (ref 8.5–10.5)
CHLORIDE SERPL-SCNC: 104 MMOL/L (ref 96–112)
CO2 SERPL-SCNC: 22 MMOL/L (ref 20–33)
CREAT SERPL-MCNC: 0.73 MG/DL (ref 0.5–1.4)
ERYTHROCYTE [DISTWIDTH] IN BLOOD BY AUTOMATED COUNT: 42.8 FL (ref 35.9–50)
FLUAV RNA SPEC QL NAA+PROBE: NEGATIVE
FLUBV RNA SPEC QL NAA+PROBE: NEGATIVE
GFR SERPLBLD CREATININE-BSD FMLA CKD-EPI: 97 ML/MIN/1.73 M 2
GLOBULIN SER CALC-MCNC: 2.8 G/DL (ref 1.9–3.5)
GLUCOSE SERPL-MCNC: 109 MG/DL (ref 65–99)
GRAM STN SPEC: ABNORMAL
HCT VFR BLD AUTO: 33 % (ref 42–52)
HGB BLD-MCNC: 11.1 G/DL (ref 14–18)
MCH RBC QN AUTO: 31.2 PG (ref 27–33)
MCHC RBC AUTO-ENTMCNC: 33.6 G/DL (ref 32.3–36.5)
MCV RBC AUTO: 92.7 FL (ref 81.4–97.8)
PLATELET # BLD AUTO: 210 K/UL (ref 164–446)
PMV BLD AUTO: 9 FL (ref 9–12.9)
POTASSIUM SERPL-SCNC: 3.9 MMOL/L (ref 3.6–5.5)
PROT SERPL-MCNC: 5.9 G/DL (ref 6–8.2)
RBC # BLD AUTO: 3.56 M/UL (ref 4.7–6.1)
RSV RNA SPEC QL NAA+PROBE: NEGATIVE
SARS-COV-2 RNA RESP QL NAA+PROBE: NOTDETECTED
SIGNIFICANT IND 70042: ABNORMAL
SITE SITE: ABNORMAL
SODIUM SERPL-SCNC: 138 MMOL/L (ref 135–145)
SOURCE SOURCE: ABNORMAL
SPECIMEN SOURCE: NORMAL
WBC # BLD AUTO: 16.5 K/UL (ref 4.8–10.8)

## 2024-12-31 PROCEDURE — 700101 HCHG RX REV CODE 250: Performed by: NURSE PRACTITIONER

## 2024-12-31 PROCEDURE — 770006 HCHG ROOM/CARE - MED/SURG/GYN SEMI*

## 2024-12-31 PROCEDURE — 99232 SBSQ HOSP IP/OBS MODERATE 35: CPT | Performed by: STUDENT IN AN ORGANIZED HEALTH CARE EDUCATION/TRAINING PROGRAM

## 2024-12-31 PROCEDURE — 36415 COLL VENOUS BLD VENIPUNCTURE: CPT

## 2024-12-31 PROCEDURE — A9270 NON-COVERED ITEM OR SERVICE: HCPCS | Performed by: INTERNAL MEDICINE

## 2024-12-31 PROCEDURE — 0241U HCHG SARS-COV-2 COVID-19 NFCT DS RESP RNA 4 TRGT MIC: CPT

## 2024-12-31 PROCEDURE — 85027 COMPLETE CBC AUTOMATED: CPT

## 2024-12-31 PROCEDURE — 700105 HCHG RX REV CODE 258: Performed by: INTERNAL MEDICINE

## 2024-12-31 PROCEDURE — 700111 HCHG RX REV CODE 636 W/ 250 OVERRIDE (IP): Mod: JZ | Performed by: INTERNAL MEDICINE

## 2024-12-31 PROCEDURE — 700102 HCHG RX REV CODE 250 W/ 637 OVERRIDE(OP): Performed by: INTERNAL MEDICINE

## 2024-12-31 PROCEDURE — 80053 COMPREHEN METABOLIC PANEL: CPT

## 2024-12-31 RX ADMIN — AMPICILLIN AND SULBACTAM 3 G: 1; 2 INJECTION, POWDER, FOR SOLUTION INTRAMUSCULAR; INTRAVENOUS at 23:59

## 2024-12-31 RX ADMIN — MONTELUKAST 10 MG: 10 TABLET, FILM COATED ORAL at 16:09

## 2024-12-31 RX ADMIN — ACETAMINOPHEN 650 MG: 325 TABLET ORAL at 16:09

## 2024-12-31 RX ADMIN — AMPICILLIN AND SULBACTAM 3 G: 1; 2 INJECTION, POWDER, FOR SOLUTION INTRAMUSCULAR; INTRAVENOUS at 17:47

## 2024-12-31 RX ADMIN — SODIUM CHLORIDE, PRESERVATIVE FREE 10 ML: 5 INJECTION INTRAVENOUS at 17:49

## 2024-12-31 RX ADMIN — ACYCLOVIR 800 MG: 800 TABLET ORAL at 20:02

## 2024-12-31 RX ADMIN — AMPICILLIN AND SULBACTAM 3 G: 1; 2 INJECTION, POWDER, FOR SOLUTION INTRAMUSCULAR; INTRAVENOUS at 05:12

## 2024-12-31 RX ADMIN — ACYCLOVIR 800 MG: 800 TABLET ORAL at 09:53

## 2024-12-31 RX ADMIN — ENOXAPARIN SODIUM 40 MG: 100 INJECTION SUBCUTANEOUS at 16:09

## 2024-12-31 RX ADMIN — AMPICILLIN AND SULBACTAM 3 G: 1; 2 INJECTION, POWDER, FOR SOLUTION INTRAMUSCULAR; INTRAVENOUS at 13:23

## 2024-12-31 RX ADMIN — AMPICILLIN AND SULBACTAM 3 G: 1; 2 INJECTION, POWDER, FOR SOLUTION INTRAMUSCULAR; INTRAVENOUS at 01:05

## 2024-12-31 RX ADMIN — SODIUM CHLORIDE, PRESERVATIVE FREE 10 ML: 5 INJECTION INTRAVENOUS at 05:11

## 2024-12-31 ASSESSMENT — ENCOUNTER SYMPTOMS
CHILLS: 0
FEVER: 0
PALPITATIONS: 0
WEAKNESS: 0
SHORTNESS OF BREATH: 0
VOMITING: 0
ABDOMINAL PAIN: 1
NAUSEA: 0

## 2024-12-31 ASSESSMENT — PAIN DESCRIPTION - PAIN TYPE: TYPE: ACUTE PAIN

## 2024-12-31 NOTE — DISCHARGE PLANNING
"TCN following. HTH/SCP chart reviewed. Collaborated with AMERICA Foreman. No new TCN needs identified as choice proactively obtained for HH and IV infusion if needed in discharge planning. CM advised of no further TCN needs at this time. Per review, noted patient followed by ID and IR with \"plan to continue IV antibiotics and repeat CT A/P on 1/3\" (please see hospital medicine note 12/31 at 6:09AM). Noted current 6 click scores remain 22 ADL and 20 mobility and per kardex mobility documented up to 250 feet without AD.     TCN will continue to follow and collaborate with discharge planning team as additional post acute needs arise. Thank you.      Completed today:  Choice obtained: HH (1. [facility], 2. [facility]) & Infusion ([facility]).   Pt aware of [facility] blanket referral policy  SCP with [facility] PCP. Discussed possible outpatient transitional PCP follow up if indicated and pt in agreement. In review of AVS, patient is scheduled for Hospital Follow Up with Resident Kaye Owens D.O. Monday Jan 6, 2025 3:00 PM  "

## 2024-12-31 NOTE — PROGRESS NOTES
In the shower at time of rounds  Blood and liver cultures strep intermedius  Continue Unasyn  Repeat CT planned 1/3

## 2024-12-31 NOTE — CARE PLAN
The patient is Stable - Low risk of patient condition declining or worsening    Shift Goals  Clinical Goals: pt will toelerate abx,  Patient Goals: rest,  Family Goals: n/a none present    Progress made toward(s) clinical / shift goals:    Problem: Pain - Standard  Goal: Alleviation of pain or a reduction in pain to the patient’s comfort goal  Description: Target End Date:  Prior to discharge or change in level of care    Document on Vitals flowsheet    1.  Document pain using the appropriate pain scale per order or unit policy  2.  Educate and implement non-pharmacologic comfort measures (i.e. relaxation, distraction, massage, cold/heat therapy, etc.)  3.  Pain management medications as ordered  4.  Reassess pain after pain med administration per policy  5.  If opiods administered assess patient's response to pain medication is appropriate per POSS sedation scale  6.  Follow pain management plan developed in collaboration with patient and interdisciplinary team (including palliative care or pain specialists if applicable)  Outcome: Progressing     DANISHA drain in place, able to shower today. ABX.

## 2024-12-31 NOTE — PROGRESS NOTES
Radiology Progress Note   Author: HEATHER Osorio Date & Time created: 12/31/2024  8:25 AM   Date of admission  12/28/2024  Note to reader: this note follows the APSO format rather than the historical SOAP format. Assessment and plan located at the top of the note for ease of use.    Chief Complaint  69 y.o. male admitted 12/28/2024 with   Chief Complaint   Patient presents with    Sent from Urgent Care      wbc 24, fever, chills. Called patient who requested antibiotics however at this time there is no clear source of infection. He has a history of non-Hodgkin's lymphoma and reports new lymphadenopathy. Further work up is needed as fevers ar on going with no known source of infection. Advised patient to go to  ED further evaluation; he verbalized understanding and intention to go to ED.    Fever     X 3 days now. Pt states taking advil for fever however it comes right back.     Urinary Frequency     Pt endorses urinary urgency  has gotten much worse the last 3/4 days          HPI  69-year-old male with past medical history significant for non-Hodgkin's lymphoma, nephrolithiasis, HLD admitted 12/28/2024 for CT finding of right hepatic fluid collection concerning for abscess after presenting to the ER with fevers and chills.  IR was consulted and patient underwent CT-guided liver abscess drain placement with IR Dr. Chandler on 12/29/2024    Interval History:   12/30/2024 -hepatic abscess 12F drain to RUQ with 140 mL slightly turbid serosanguinous output in the last 24 hours. Labs reviewed; WBC 20.8, H&H 11.5/33.9, , , alk phos 126, creatinine 0.84.  Hepatic fluid cultures pending. Drain flushed with 10 mL NS. I reviewed IDT notes and images.    12/21/24 - Hepatic abscess 12F drain to RUQ with 68 mL seropurulent output in the last 24 hours. Labs reviewed; WBC 16.5, H&H 11.1/33.0, AST 52, , alk phos 125, creatinine 0.73.  Hepatic fluid cultures preliminarily positive for streptococcus  intermedius. Drain flushed with 10 mL NS. I reviewed IDT notes and images and discussed patient with Dr. Griffin.     Assessment/Plan     Principal Problem:    Liver abscess  Active Problems:    Non-Hodgkin's lymphoma (HCC)    Dyslipidemia    Hydronephrosis of right kidney    Elevated LFTs    ACP (advance care planning)      Plan IR  - Continue order to irrigate RUQ drain with 10 ml of sterile saline each shift  - Fluid cultures preliminarily positive for streptococcus intermedius  - ID following   - Recommend follow up CT scan in 5-7 days (01/03/25)  - Continue to monitor drains, VS, and labs    -  -Thank you for allowing Interventional Radiology team to participate in the patients care, if any additional care or requests are needed in the future please do not hesitate to call or place IR order           Review of Systems  Physical Exam   Review of Systems   Constitutional:  Negative for chills and fever.   Respiratory:  Negative for shortness of breath.    Cardiovascular:  Negative for chest pain and palpitations.   Gastrointestinal:  Positive for abdominal pain. Negative for nausea and vomiting.   Neurological:  Negative for weakness.      Vitals:    12/31/24 0732   BP: 132/72   Pulse: 70   Resp: 18   Temp: 36.5 °C (97.7 °F)   SpO2: 95%        Physical Exam  Cardiovascular:      Rate and Rhythm: Normal rate.   Pulmonary:      Effort: Pulmonary effort is normal.   Abdominal:      General: There is no distension.      Tenderness: There is abdominal tenderness.      Comments: IR drain to RUQ   Skin:     General: Skin is warm and dry.   Neurological:      General: No focal deficit present.      Mental Status: He is alert and oriented to person, place, and time.   Psychiatric:         Mood and Affect: Mood normal.         Behavior: Behavior normal.             Labs    Recent Labs     12/29/24  0048 12/30/24  0158 12/31/24  0015   WBC 21.2* 20.8* 16.5*   RBC 3.58* 3.64* 3.56*   HEMOGLOBIN 11.1* 11.5* 11.1*  "  HEMATOCRIT 33.1* 33.9* 33.0*   MCV 92.5 93.1 92.7   MCH 31.0 31.6 31.2   MCHC 33.5 33.9 33.6   RDW 41.8 42.5 42.8   PLATELETCT 179 197 210   MPV 9.0 9.3 9.0     Recent Labs     12/29/24  0048 12/30/24  0158 12/31/24  0015   SODIUM 135 135 138   POTASSIUM 3.7 3.5* 3.9   CHLORIDE 101 101 104   CO2 22 22 22   GLUCOSE 136* 150* 109*   BUN 18 21 22   CREATININE 0.90 0.84 0.73   CALCIUM 8.9 8.8 8.4*     Recent Labs     12/29/24  0048 12/30/24  0158 12/31/24  0015   ALBUMIN 3.4 3.2 3.1*   TBILIRUBIN 1.7* 1.1 0.6   ALKPHOSPHAT 122* 126* 125*   TOTPROTEIN 6.3 6.1 5.9*   ALTSGPT 105* 159* 109*   ASTSGOT 93* 125* 52*   CREATININE 0.90 0.84 0.73     CT-DRAIN-LIVER ABSCESS-CYST   Final Result      1.  CT guided placement of a percutaneous drainage catheter in a right hepatic abscess.   2.  The current plan is to obtain a follow-up CT in 5-7 days..      CT-ABDOMEN-PELVIS WITH   Final Result   Addendum (preliminary) 1 of 1   These findings were discussed with JAYDON BEYER on 12/28/2024    9:12 PM.      Final      1.  Large irregular heterogeneous low-attenuation lesion in the RIGHT lobe liver superiorly measuring 5.6 cm, most suggestive of abscess.   2.  Prominent pericaval, portacaval and debora hepatis lymph nodes, inflammatory versus neoplastic.   3.  Mild RIGHT hydronephrosis without obstructing ureteral stone demonstrated.   4.  Small nonobstructing LEFT kidney stone.      DX-CHEST-PORTABLE (1 VIEW)   Final Result      1.  No acute cardiopulmonary disease.   2.  Probable RIGHT lung base Nipple shadows.  Consider confirmation with nonemergent followup chest x-ray with nipple markers and shallow oblique views.           INR   Date Value Ref Range Status   12/29/2024 1.37 (H) 0.87 - 1.13 Final     Comment:     INR - Non-therapeutic Reference Range: 0.87-1.13  INR - Therapeutic Reference Range: 2.0-4.0       No results found for: \"POCINR\"     Intake/Output Summary (Last 24 hours) at 12/30/2024 1036  Last data filed at " 12/30/2024 0948  Gross per 24 hour   Intake 3080 ml   Output 1690 ml   Net 1390 ml      I have personally reviewed the above labs and imaging      I have performed a physical exam and reviewed and updated ROS and Plan today (12/31/2024).     39 minutes in directly providing and coordinating care and extensive data review.  No time overlap and excludes procedures.

## 2024-12-31 NOTE — PROGRESS NOTES
Hospital Medicine Daily Progress Note    Date of Service  12/31/2024    Chief Complaint  Jet Webb is a 69 y.o. male admitted 12/28/2024 with fever and chills.    Hospital Course  Jet Webb is a 69-year-old male with PMHx non-Hodgkin's lymphoma, nephrolithiasis, HLD.  Admitted 12/28 for right sided liver abscesses.    Initially, patient presenting with fever, chills. CT of the abdomen pelvis with contrast: Right liver lobe lesion 5.6 cm most suggestive of abscess. Prominent pericaval, portacaval and debora hepatis lymph nodes. Mild right hydronephrosis without obstructing ureteral stones demonstrated.     Patient was started on IV Zosyn.  IR was consulted.  Patient underwent CT-guided drainage on 12/29.    Interval Problem Update  12/30: Vitals notable for Tmax 100.7.  Intermittent tachycardia.  SBP ranging 106 through 159.  DANISHA drain placed yesterday.  Wound cultures pending.    12/31: Patient remains afebrile.   through 144.  Resting comfortably on room air.  WBC 16.5 from 20.8.  Hb stable at 11.1.  LFTs downtrending.  Wound cultures positive for strep intermedius.  Discontinue IV Zosyn.  Start IV Unasyn.  Plan to repeat CT A/P on 1/3. Discussed with JUAN Quintana with IR at bedside. Plan to continue IV antibiotics and repeat CT A/P on 1/3.     I have discussed this patient's plan of care and discharge plan at IDT rounds today with Case Management, Nursing, Nursing leadership, and other members of the IDT team.    Consultants/Specialty  infectious disease and IR    Code Status  Full Code    Disposition  The patient is not medically cleared for discharge to home or a post-acute facility.      I have placed the appropriate orders for post-discharge needs.    Review of Systems  Review of Systems   Constitutional:  Negative for fever and malaise/fatigue.   Respiratory:  Negative for shortness of breath.    Cardiovascular:  Negative for chest pain and leg swelling.   Gastrointestinal:   Positive for abdominal pain. Negative for nausea and vomiting.        Physical Exam  Temp:  [36.1 °C (97 °F)-36.9 °C (98.4 °F)] 36.5 °C (97.7 °F)  Pulse:  [60-75] 70  Resp:  [16-18] 17  BP: (123-144)/(72-78) 132/72  SpO2:  [93 %-95 %] 95 %    Physical Exam  Vitals and nursing note reviewed.   Constitutional:       General: He is not in acute distress.     Appearance: Normal appearance. He is not ill-appearing.   Cardiovascular:      Rate and Rhythm: Normal rate and regular rhythm.   Pulmonary:      Effort: Pulmonary effort is normal.      Breath sounds: Normal breath sounds.   Abdominal:      General: Bowel sounds are normal. There is no distension.      Palpations: Abdomen is soft.      Tenderness: There is abdominal tenderness.      Comments: DANISHA drain to epigastric region.  Serosanguineous drainage present   Skin:     General: Skin is warm and dry.   Neurological:      Mental Status: He is alert and oriented to person, place, and time. Mental status is at baseline.   Psychiatric:         Mood and Affect: Mood normal.         Behavior: Behavior normal.         Fluids    Intake/Output Summary (Last 24 hours) at 12/31/2024 1008  Last data filed at 12/31/2024 0526  Gross per 24 hour   Intake 360 ml   Output 669 ml   Net -309 ml        Laboratory  Recent Labs     12/29/24  0048 12/30/24  0158 12/31/24  0015   WBC 21.2* 20.8* 16.5*   RBC 3.58* 3.64* 3.56*   HEMOGLOBIN 11.1* 11.5* 11.1*   HEMATOCRIT 33.1* 33.9* 33.0*   MCV 92.5 93.1 92.7   MCH 31.0 31.6 31.2   MCHC 33.5 33.9 33.6   RDW 41.8 42.5 42.8   PLATELETCT 179 197 210   MPV 9.0 9.3 9.0     Recent Labs     12/29/24  0048 12/30/24  0158 12/31/24  0015   SODIUM 135 135 138   POTASSIUM 3.7 3.5* 3.9   CHLORIDE 101 101 104   CO2 22 22 22   GLUCOSE 136* 150* 109*   BUN 18 21 22   CREATININE 0.90 0.84 0.73   CALCIUM 8.9 8.8 8.4*     Recent Labs     12/29/24  0048   INR 1.37*               Imaging  CT-DRAIN-LIVER ABSCESS-CYST   Final Result      1.  CT guided placement of  a percutaneous drainage catheter in a right hepatic abscess.   2.  The current plan is to obtain a follow-up CT in 5-7 days..      CT-ABDOMEN-PELVIS WITH   Final Result   Addendum (preliminary) 1 of 1   These findings were discussed with JAYDON BEYER on 12/28/2024    9:12 PM.      Final      1.  Large irregular heterogeneous low-attenuation lesion in the RIGHT lobe liver superiorly measuring 5.6 cm, most suggestive of abscess.   2.  Prominent pericaval, portacaval and debora hepatis lymph nodes, inflammatory versus neoplastic.   3.  Mild RIGHT hydronephrosis without obstructing ureteral stone demonstrated.   4.  Small nonobstructing LEFT kidney stone.      DX-CHEST-PORTABLE (1 VIEW)   Final Result      1.  No acute cardiopulmonary disease.   2.  Probable RIGHT lung base Nipple shadows.  Consider confirmation with nonemergent followup chest x-ray with nipple markers and shallow oblique views.              Assessment/Plan  * Liver abscess- (present on admission)  Assessment & Plan  CT of the abdomen pelvis with contrast: Right liver lobe lesion 5.6 cm most suggestive of abscess.  S/p drain placement 12/29  Cultures growing strep intermedius  - Discontinue IV Zosyn  - Start IV Unasyn  - Following cultures; NGTD  - ID following  - Repeat CMP in a.m.  - Continue drain management per IR recommendations  - Repeat CT A/P 1/3    ACP (advance care planning)  Assessment & Plan  Patient admitted with liver abscess.  History of non-Hodgkin lymphoma.  Age of 69.  I discussed the CODE STATUS with him and his  at bedside, including CPR, intubation/mechanical ventilation.  He wants to stay full code if there is no terminal diagnosis or mental inability.  Continue full code.  ACP 16 minutes.  Above per previous hospitalist     Elevated LFTs  Assessment & Plan  ; ;   Presumed secondary to liver abscess  Hepatitis panel negative  - Repeat CMP in a.m.    Hydronephrosis of right kidney  Assessment &  Plan  Incidental finding on CT of the abdomen with contrast: Mild right hydronephrosis without obstructing ureteral stone.    Dyslipidemia- (present on admission)  Assessment & Plan  Will hold Lipitor due to elevated LFTs    Non-Hodgkin's lymphoma (HCC)- (present on admission)  Assessment & Plan  In remission for 4-5 years   per oncology note is low-grade, not on active treatments.    Continue  surveillance per Dr. Chen         VTE prophylaxis:   SCDs/TEDs   enoxaparin ppx      I have performed a physical exam and reviewed and updated ROS and Plan today (12/31/2024). In review of yesterday's note (12/30/2024), there are no changes except as documented above.

## 2024-12-31 NOTE — CARE PLAN
The patient is Stable - Low risk of patient condition declining or worsening    Shift Goals  Clinical Goals: Continue abx  Patient Goals: rest  Family Goals: nitin    Progress made toward(s) clinical / shift goals: Patient is alert and oriented x4. Updated on POC and verbalized understanding. Medicated per MAR and tolerated well. Fall precautions and hourly rounding in place. Needs attended.    Patient is not progressing towards the following goals: PRN for pain given.

## 2024-12-31 NOTE — PROGRESS NOTES
Received bedside report from previous RN. Patient is resting in a chair, alert and oriented x4. Denies pain at this time. Fall precautions in place and call light within reach. All needs met at this time.

## 2025-01-01 PROBLEM — R78.81 BACTEREMIA: Status: ACTIVE | Noted: 2025-01-01

## 2025-01-01 LAB
ALBUMIN SERPL BCP-MCNC: 3.1 G/DL (ref 3.2–4.9)
ALBUMIN/GLOB SERPL: 1.1 G/DL
ALP SERPL-CCNC: 149 U/L (ref 30–99)
ALT SERPL-CCNC: 91 U/L (ref 2–50)
ANION GAP SERPL CALC-SCNC: 10 MMOL/L (ref 7–16)
AST SERPL-CCNC: 48 U/L (ref 12–45)
BACTERIA BLD CULT: ABNORMAL
BILIRUB SERPL-MCNC: 0.5 MG/DL (ref 0.1–1.5)
BUN SERPL-MCNC: 15 MG/DL (ref 8–22)
CALCIUM ALBUM COR SERPL-MCNC: 9.2 MG/DL (ref 8.5–10.5)
CALCIUM SERPL-MCNC: 8.5 MG/DL (ref 8.5–10.5)
CHLORIDE SERPL-SCNC: 102 MMOL/L (ref 96–112)
CO2 SERPL-SCNC: 24 MMOL/L (ref 20–33)
CREAT SERPL-MCNC: 0.78 MG/DL (ref 0.5–1.4)
ERYTHROCYTE [DISTWIDTH] IN BLOOD BY AUTOMATED COUNT: 42.9 FL (ref 35.9–50)
ETEST SENSITIVITY ETEST: NORMAL
GFR SERPLBLD CREATININE-BSD FMLA CKD-EPI: 95 ML/MIN/1.73 M 2
GLOBULIN SER CALC-MCNC: 2.9 G/DL (ref 1.9–3.5)
GLUCOSE SERPL-MCNC: 120 MG/DL (ref 65–99)
HCT VFR BLD AUTO: 35.8 % (ref 42–52)
HGB BLD-MCNC: 11.8 G/DL (ref 14–18)
MCH RBC QN AUTO: 31.2 PG (ref 27–33)
MCHC RBC AUTO-ENTMCNC: 33 G/DL (ref 32.3–36.5)
MCV RBC AUTO: 94.7 FL (ref 81.4–97.8)
PLATELET # BLD AUTO: 260 K/UL (ref 164–446)
PMV BLD AUTO: 9.1 FL (ref 9–12.9)
POTASSIUM SERPL-SCNC: 3.9 MMOL/L (ref 3.6–5.5)
PROT SERPL-MCNC: 6 G/DL (ref 6–8.2)
RBC # BLD AUTO: 3.78 M/UL (ref 4.7–6.1)
SIGNIFICANT IND 70042: ABNORMAL
SIGNIFICANT IND 70042: ABNORMAL
SITE SITE: ABNORMAL
SITE SITE: ABNORMAL
SODIUM SERPL-SCNC: 136 MMOL/L (ref 135–145)
SOURCE SOURCE: ABNORMAL
SOURCE SOURCE: ABNORMAL
WBC # BLD AUTO: 17 K/UL (ref 4.8–10.8)

## 2025-01-01 PROCEDURE — 85027 COMPLETE CBC AUTOMATED: CPT

## 2025-01-01 PROCEDURE — 700101 HCHG RX REV CODE 250: Performed by: NURSE PRACTITIONER

## 2025-01-01 PROCEDURE — 700105 HCHG RX REV CODE 258: Performed by: INTERNAL MEDICINE

## 2025-01-01 PROCEDURE — 36415 COLL VENOUS BLD VENIPUNCTURE: CPT

## 2025-01-01 PROCEDURE — 700111 HCHG RX REV CODE 636 W/ 250 OVERRIDE (IP): Mod: JZ | Performed by: INTERNAL MEDICINE

## 2025-01-01 PROCEDURE — 87040 BLOOD CULTURE FOR BACTERIA: CPT

## 2025-01-01 PROCEDURE — 770006 HCHG ROOM/CARE - MED/SURG/GYN SEMI*

## 2025-01-01 PROCEDURE — A9270 NON-COVERED ITEM OR SERVICE: HCPCS | Performed by: INTERNAL MEDICINE

## 2025-01-01 PROCEDURE — 700102 HCHG RX REV CODE 250 W/ 637 OVERRIDE(OP): Performed by: INTERNAL MEDICINE

## 2025-01-01 PROCEDURE — 99232 SBSQ HOSP IP/OBS MODERATE 35: CPT | Performed by: STUDENT IN AN ORGANIZED HEALTH CARE EDUCATION/TRAINING PROGRAM

## 2025-01-01 PROCEDURE — 80053 COMPREHEN METABOLIC PANEL: CPT

## 2025-01-01 PROCEDURE — 99233 SBSQ HOSP IP/OBS HIGH 50: CPT | Performed by: INTERNAL MEDICINE

## 2025-01-01 RX ADMIN — ENOXAPARIN SODIUM 40 MG: 100 INJECTION SUBCUTANEOUS at 16:39

## 2025-01-01 RX ADMIN — AMPICILLIN AND SULBACTAM 3 G: 1; 2 INJECTION, POWDER, FOR SOLUTION INTRAMUSCULAR; INTRAVENOUS at 18:26

## 2025-01-01 RX ADMIN — SODIUM CHLORIDE, PRESERVATIVE FREE 10 ML: 5 INJECTION INTRAVENOUS at 16:41

## 2025-01-01 RX ADMIN — AMPICILLIN AND SULBACTAM 3 G: 1; 2 INJECTION, POWDER, FOR SOLUTION INTRAMUSCULAR; INTRAVENOUS at 12:38

## 2025-01-01 RX ADMIN — ACYCLOVIR 800 MG: 800 TABLET ORAL at 10:14

## 2025-01-01 RX ADMIN — MONTELUKAST 10 MG: 10 TABLET, FILM COATED ORAL at 16:39

## 2025-01-01 RX ADMIN — SODIUM CHLORIDE, PRESERVATIVE FREE 10 ML: 5 INJECTION INTRAVENOUS at 06:19

## 2025-01-01 RX ADMIN — ACETAMINOPHEN 650 MG: 325 TABLET ORAL at 19:52

## 2025-01-01 RX ADMIN — ACYCLOVIR 800 MG: 800 TABLET ORAL at 20:00

## 2025-01-01 RX ADMIN — AMPICILLIN AND SULBACTAM 3 G: 1; 2 INJECTION, POWDER, FOR SOLUTION INTRAMUSCULAR; INTRAVENOUS at 06:20

## 2025-01-01 ASSESSMENT — ENCOUNTER SYMPTOMS
SHORTNESS OF BREATH: 0
CHILLS: 0
NAUSEA: 0
PALPITATIONS: 0
VOMITING: 0
ABDOMINAL PAIN: 1
FEVER: 0
WEAKNESS: 0

## 2025-01-01 ASSESSMENT — PAIN DESCRIPTION - PAIN TYPE: TYPE: ACUTE PAIN

## 2025-01-01 NOTE — ASSESSMENT & PLAN NOTE
Blood cultures positive for strep intermedius 12/28  Repeat blood cultures 1/1 NGTD  Echocardiogram without obvious vegetations  - Infectious diseases following  - Continue IV Unasyn

## 2025-01-01 NOTE — PROGRESS NOTES
[facility] Medicine Daily Progress Note    Date of Service  1/1/2025    Chief Complaint  Jet Webb is a 69 y.o. male admitted 12/28/2024 with fever and chills.    Hospital Course  Jet Webb is a 69-year-old male with PMHx non-Hodgkin's lymphoma, nephrolithiasis, HLD.  Admitted 12/28 for right sided liver abscesses.    Initially, patient presenting with fever, chills. CT of the abdomen pelvis with contrast: Right liver lobe lesion 5.6 cm most suggestive of abscess. Prominent pericaval, portacaval and debora hepatis lymph nodes. Mild right hydronephrosis without obstructing ureteral stones demonstrated.     Patient was started on IV Zosyn.  IR was consulted.  Patient underwent CT-guided drainage on 12/29.    Interval Problem Update  12/30: Vitals notable for Tmax 100.7.  Intermittent tachycardia.  SBP ranging 106 through 159.  DANISHA drain placed yesterday.  Wound cultures pending.    12/31: Patient remains afebrile.   through 144.  Resting comfortably on room air.  WBC 16.5 from 20.8.  Hb stable at 11.1.  LFTs downtrending.  Wound cultures positive for strep intermedius.  Discontinue IV Zosyn.  Start IV Unasyn.  Plan to repeat CT A/P on 1/3. Discussed with JUAN Quintana with IR at bedside. Plan to continue IV antibiotics and repeat CT A/P on 1/3.     1/1: Tmax 100.8 overnight.  COVID, flu, RSV negative.  WBC 17 from 16.5.  Hb stable at 11.8.  LFTs downtrending.  Repeat blood cultures ordered for today.  Planning for CT scan 1/3.    I have discussed this patient's plan of care and discharge plan at IDT rounds today with Case Management, Nursing, Nursing leadership, and other members of the IDT team.    Consultants/Specialty  infectious disease and IR    Code Status  Full Code    Disposition  The patient is not medically cleared for discharge to home or a post-acute facility.      I have placed the appropriate orders for post-discharge needs.    Review of Systems  Review of Systems    Constitutional:  Negative for fever and malaise/fatigue.   Respiratory:  Negative for shortness of breath.    Cardiovascular:  Negative for chest pain and leg swelling.   Gastrointestinal:  Positive for abdominal pain. Negative for nausea and vomiting.        Physical Exam  Temp:  [37 °C (98.6 °F)-38.2 °C (100.8 °F)] 37.7 °C (99.8 °F)  Pulse:  [78-85] 78  Resp:  [15-18] 15  BP: (136-139)/(71-78) 139/78  SpO2:  [93 %-95 %] 94 %    Physical Exam  Vitals and nursing note reviewed.   Constitutional:       General: He is not in acute distress.     Appearance: Normal appearance. He is not ill-appearing.   Cardiovascular:      Rate and Rhythm: Normal rate and regular rhythm.   Pulmonary:      Effort: Pulmonary effort is normal.      Breath sounds: Normal breath sounds.   Abdominal:      General: Bowel sounds are normal. There is no distension.      Palpations: Abdomen is soft.      Tenderness: There is abdominal tenderness.      Comments: DANISHA drain to epigastric region.  Serosanguineous drainage present   Skin:     General: Skin is warm and dry.   Neurological:      Mental Status: He is alert and oriented to person, place, and time. Mental status is at baseline.   Psychiatric:         Mood and Affect: Mood normal.         Behavior: Behavior normal.         Fluids    Intake/Output Summary (Last 24 hours) at 1/1/2025 1255  Last data filed at 1/1/2025 0900  Gross per 24 hour   Intake 480 ml   Output 625 ml   Net -145 ml        Laboratory  Recent Labs     12/30/24  0158 12/31/24  0015 01/01/25  0319   WBC 20.8* 16.5* 17.0*   RBC 3.64* 3.56* 3.78*   HEMOGLOBIN 11.5* 11.1* 11.8*   HEMATOCRIT 33.9* 33.0* 35.8*   MCV 93.1 92.7 94.7   MCH 31.6 31.2 31.2   MCHC 33.9 33.6 33.0   RDW 42.5 42.8 42.9   PLATELETCT 197 210 260   MPV 9.3 9.0 9.1     Recent Labs     12/30/24  0158 12/31/24  0015 01/01/25  0319   SODIUM 135 138 136   POTASSIUM 3.5* 3.9 3.9   CHLORIDE 101 104 102   CO2 22 22 24   GLUCOSE 150* 109* 120*   BUN 21 22 15    CREATININE 0.84 0.73 0.78   CALCIUM 8.8 8.4* 8.5                     Imaging  CT-DRAIN-LIVER ABSCESS-CYST   Final Result      1.  CT guided placement of a percutaneous drainage catheter in a right hepatic abscess.   2.  The current plan is to obtain a follow-up CT in 5-7 days..      CT-ABDOMEN-PELVIS WITH   Final Result   Addendum (preliminary) 1 of 1   These findings were discussed with JAYDON BEYER on 12/28/2024    9:12 PM.      Final      1.  Large irregular heterogeneous low-attenuation lesion in the RIGHT lobe liver superiorly measuring 5.6 cm, most suggestive of abscess.   2.  Prominent pericaval, portacaval and debora hepatis lymph nodes, inflammatory versus neoplastic.   3.  Mild RIGHT hydronephrosis without obstructing ureteral stone demonstrated.   4.  Small nonobstructing LEFT kidney stone.      DX-CHEST-PORTABLE (1 VIEW)   Final Result      1.  No acute cardiopulmonary disease.   2.  Probable RIGHT lung base Nipple shadows.  Consider confirmation with nonemergent followup chest x-ray with nipple markers and shallow oblique views.              Assessment/Plan  * Liver abscess- (present on admission)  Assessment & Plan  CT of the abdomen pelvis with contrast: Right liver lobe lesion 5.6 cm most suggestive of abscess.  S/p drain placement 12/29  Cultures growing strep intermedius  - Discontinue IV Zosyn  - Continue IV Unasyn  - Following cultures; NGTD  - ID following  - Repeat CMP in a.m.  - Continue drain management per IR recommendations  - Repeat CT A/P 1/3    Bacteremia  Assessment & Plan  Blood cultures 12/28  - Repeat blood cultures every 48 hours until negative  - Repeat blood cultures collected today  - Positive for strep intermedius infectious diseases following  - Continue IV Unasyn    ACP (advance care planning)  Assessment & Plan  Patient admitted with liver abscess.  History of non-Hodgkin lymphoma.  Age of 69.  I discussed the CODE STATUS with him and his  at bedside,  including CPR, intubation/mechanical ventilation.  He wants to stay full code if there is no terminal diagnosis or mental inability.  Continue full code.  ACP 16 minutes.  Above per previous hospitalist     Elevated LFTs  Assessment & Plan  ; ;   Presumed secondary to liver abscess  Hepatitis panel negative  - Repeat CMP in a.m.    Hydronephrosis of right kidney  Assessment & Plan  Incidental finding on CT of the abdomen with contrast: Mild right hydronephrosis without obstructing ureteral stone.    Dyslipidemia- (present on admission)  Assessment & Plan  Will hold Lipitor due to elevated LFTs    Non-Hodgkin's lymphoma (HCC)- (present on admission)  Assessment & Plan  In remission for 4-5 years   per oncology note is low-grade, not on active treatments.    Continue  surveillance per Dr. Chen         VTE prophylaxis:   SCDs/TEDs      I have performed a physical exam and reviewed and updated ROS and Plan today (1/1/2025). In review of yesterday's note (12/31/2024), there are no changes except as documented above.

## 2025-01-01 NOTE — PROGRESS NOTES
Infectious Disease Progress Note    Author: Anca Russo M.D. Date & Time of service: 2025  11:47 AM    Chief Complaint:   liver abscess  Strep bacteremia    Interval History:   69 y.o. male admitted 2024 for fever and chills due to above above  25 AF WBC 17 In the bathroom again today-no acute events overnight  Labs Reviewed, Medications Reviewed, and Radiology Reviewed.    Review of Systems:  Review of Systems   Constitutional:  Negative for fever.       Hemodynamics:  Temp (24hrs), Av.6 °C (99.6 °F), Min:37 °C (98.6 °F), Max:38.2 °C (100.8 °F)  Temperature: 37.7 °C (99.8 °F)  Pulse  Av.7  Min: 60  Max: 103   Blood Pressure : 139/78       Physical Exam:  Physical Exam  Vitals and nursing note reviewed.   Cardiovascular:      Rate and Rhythm: Normal rate.   Pulmonary:      Effort: Pulmonary effort is normal.   Abdominal:      Comments: drain         Meds:    Current Facility-Administered Medications:     ibuprofen    ampicillin-sulbactam (UNASYN) IV    normal saline flush    enoxaparin (LOVENOX) injection    acetaminophen    Notify provider if pain remains uncontrolled **AND** Use the Numeric Rating Scale (NRS), Walsh-Baker Faces (WBF), or FLACC on regular floors and Critical-Care Pain Observation Tool (CPOT) on ICUs/Trauma to assess pain **AND** Pulse Ox **AND** Pharmacy Consult Request **AND** If patient difficult to arouse and/or has respiratory depression (respiratory rate of 10 or less), stop any opiates that are currently infusing and call a Rapid Response.    oxyCODONE immediate-release **OR** oxyCODONE immediate-release **OR** morphine injection    senna-docusate **AND** polyethylene glycol/lytes    labetalol    ondansetron    ondansetron    acyclovir    montelukast    Labs:  Recent Labs     24  0158 24  0015 25  0319   WBC 20.8* 16.5* 17.0*   RBC 3.64* 3.56* 3.78*   HEMOGLOBIN 11.5* 11.1* 11.8*   HEMATOCRIT 33.9* 33.0* 35.8*   MCV 93.1 92.7 94.7   MCH 31.6  31.2 31.2   RDW 42.5 42.8 42.9   PLATELETCT 197 210 260   MPV 9.3 9.0 9.1     Recent Labs     12/30/24  0158 12/31/24  0015 01/01/25  0319   SODIUM 135 138 136   POTASSIUM 3.5* 3.9 3.9   CHLORIDE 101 104 102   CO2 22 22 24   GLUCOSE 150* 109* 120*   BUN 21 22 15     Recent Labs     12/30/24  0158 12/31/24  0015 01/01/25  0319   ALBUMIN 3.2 3.1* 3.1*   TBILIRUBIN 1.1 0.6 0.5   ALKPHOSPHAT 126* 125* 149*   TOTPROTEIN 6.1 5.9* 6.0   ALTSGPT 159* 109* 91*   ASTSGOT 125* 52* 48*   CREATININE 0.84 0.73 0.78       Imaging:  CT-DRAIN-LIVER ABSCESS-CYST    Result Date: 12/29/2024 12/29/2024 11:48 AM HISTORY/REASON FOR EXAM:  SENT FROM URGENT CARE, FEVER, URINARY FREQUENCY TECHNIQUE/EXAM DESCRIPTION: Hepatic abscess drainage with CT guidance. Low dose optimization technique was utilized for this CT exam including automated exposure control and adjustment of the mA and/or kV according to patient size. PROCEDURE: Informed consent was obtained. Moderate sedation with Fentanyl and Versed was administered during the procedure with appropriate continuous patient monitoring by the radiology nurse. Intraservice duration: 20 minutes Localizing CT images were obtained with the patient in supine position. The skin was prepped with ChloraPrep and draped in a sterile fashion. Following local anesthesia with 1% Lidocaine, a 17 G guiding needle was placed and needle path confirmed with CT. An Amplatz guidewire was placed and following serial tract dilatation, a 12 Mohawk pigtail locking catheter was placed. A specimen was collected and submitted for culture and sensitivity and Gram stain. Total of 40 mL bloody purulent fluid was drained. The catheter was secured to the skin and connected to suction bulb drainage. Final CT images were obtained documenting catheter position. The patient tolerated the procedure well with no evidence of complication. COMPARISON: None available. FINDINGS: The final CT images show satisfactory catheter position  within the target collection.     1.  CT guided placement of a percutaneous drainage catheter in a right hepatic abscess. 2.  The current plan is to obtain a follow-up CT in 5-7 days..    CT-ABDOMEN-PELVIS WITH    Addendum Date: 12/28/2024    These findings were discussed with JAYDON BEYER on 12/28/2024 9:12 PM.    Result Date: 12/28/2024 12/28/2024 8:39 PM HISTORY/REASON FOR EXAM:  Fever, history of lymphoma and adenopathy. TECHNIQUE/EXAM DESCRIPTION:   CT scan of the abdomen and pelvis with contrast. Contrast-enhanced helical scanning was obtained from the diaphragmatic domes through the pubic symphysis following the bolus administration of nonionic contrast without complication. 100 mL of Omnipaque 350 nonionic contrast was administered without complication. Low dose optimization technique was utilized for this CT exam including automated exposure control and adjustment of the mA and/or kV according to patient size. COMPARISON: No prior studies available. FINDINGS: Lower Chest: Minimal dependent atelectasis. Liver: Heterogeneous irregular low-attenuation structure in the superior RIGHT lobe liver measuring 5.1 x 5.7 x 4.5 cm. Spleen: Enlarged measuring 14.4 cm. Pancreas: Unremarkable. Gallbladder: No calcified stones. Biliary: Nondilated. Adrenal glands: Normal. Kidneys: Small nonobstructing LEFT kidney stone.  RIGHT kidney shows mildly prominent collecting system and proximal ureter without calcified stone demonstrated.. Bowel: No bowel obstruction or focal bowel wall thickening. Lymph nodes: Multiple mildly enlarged  debora hepatis and portacaval lymph nodes measuring up to 16 mm short axis.  Mildly prominent pericaval lymph nodes. Vasculature: Unremarkable. Peritoneum: Unremarkable without ascites. Musculoskeletal: Degenerative disc disease at L5-S1 with grade 2 anterolisthesis.  Probable L5 pars defects.  LEFT hip prosthesis. Pelvis: Bladder is unremarkable.  No dependent fluid.     1.  Large  irregular heterogeneous low-attenuation lesion in the RIGHT lobe liver superiorly measuring 5.6 cm, most suggestive of abscess. 2.  Prominent pericaval, portacaval and debora hepatis lymph nodes, inflammatory versus neoplastic. 3.  Mild RIGHT hydronephrosis without obstructing ureteral stone demonstrated. 4.  Small nonobstructing LEFT kidney stone.    DX-CHEST-PORTABLE (1 VIEW)    Result Date: 12/28/2024 12/28/2024 7:57 PM HISTORY/REASON FOR EXAM:  Sepsis; sepsis. Fever. TECHNIQUE/EXAM DESCRIPTION AND NUMBER OF VIEWS: Single portable view of the chest. COMPARISON: None FINDINGS: Cardiomediastinal contour is within normal limits. No focal pulmonary consolidation. Probable nipple shadow of the RIGHT lung base. No pleural fluid collection or pneumothorax. No major bony abnormality is seen.     1.  No acute cardiopulmonary disease. 2.  Probable RIGHT lung base Nipple shadows.  Consider confirmation with nonemergent followup chest x-ray with nipple markers and shallow oblique views.       Micro:  Results       Procedure Component Value Units Date/Time    Blood Culture - Draw one from central line and one from peripheral site [[id number]]  (Abnormal) Collected: 12/28/24 1942    Order Status: Completed Specimen: Blood from Line Updated: 01/01/25 0719     Significant Indicator POS     Source BLD     Site Peripheral     Culture Result Growth detected by automated blood culture system.  12/29/2024  22:49        Streptococcus intermedius  See previous culture for sensitivity report.      E-Test [[id number]] Collected: 12/28/24 1942    Order Status: Completed Specimen: Other Updated: 01/01/25 0719     ETEST Sensitivity FINAL    Narrative:      61 tel. [id number] 12/29/2024, 18:25, RB PERF. RESULTS CALLED TO:[id number]    Blood Culture - Draw one from central line and one from peripheral site [[id number]]  (Abnormal)  (Susceptibility) Collected: 12/28/24 1942    Order Status: Completed Specimen: Blood from Peripheral Updated: 01/01/25  0719     Significant Indicator POS     Source BLD     Site PERIPHERAL     Culture Result Growth detected by automated blood culture system. 12/29/2024  18:22        Streptococcus intermedius    Susceptibility       Streptococcus intermedius (1)       Antibiotic Interpretation Microscan   Method Status    Cefotaxime Sensitive 0.094 mcg/mL E-TEST Final                       CoV-2, Flu A/B, And RSV by PCR ([facility]) [[id number]] Collected: 12/31/24 1616    Order Status: Completed Specimen: Respirate from Nasopharyngeal Updated: 12/31/24 1718     Influenza virus A RNA Negative     Influenza virus B, PCR Negative     RSV, PCR Negative     SARS-CoV-2 by PCR NotDetected     Comment: [facility] providers: PLEASE REFER TO DE-ESCALATION AND RETESTING PROTOCOL  on [email or web address]    **The [facility] GeneXpert Xpress SARS-CoV-2 RT-PCR Test has been made  available for use under the Emergency Use Authorization (EUA) only.          SARS-CoV-2 Source NP Swab    CULTURE WOUND W/ GRAM STAIN [[id number]]  (Abnormal) Collected: 12/29/24 1215    Order Status: Completed Specimen: Wound Updated: 12/31/24 0942     Significant Indicator POS     Source WND     Site Liver abscess     Culture Result -     Gram Stain Result Many WBCs.  Many Gram positive cocci in chains.       Culture Result Streptococcus intermedius  Heavy growth      Urine Culture (New) [[id number]] Collected: 12/28/24 2011    Order Status: Completed Specimen: Urine Updated: 12/30/24 0714     Significant Indicator NEG     Source UR     Site -     Culture Result No growth at 48 hours.    GRAM STAIN [[id number]] Collected: 12/29/24 1215    Order Status: Completed Specimen: Wound Updated: 12/29/24 1812     Significant Indicator .     Source WND     Site Liver abscess     Gram Stain Result Many WBCs.  Many Gram positive cocci in chains.      FLUID CULTURE W/GRAM STAIN [[id number]]     Order Status: Canceled Specimen: Other Body Fluid     Urinalysis [[id number]] Collected: 12/28/24 2011     Order Status: Completed Specimen: Urine Updated: 12/28/24 2023     Color Yellow     Character Clear     Specific Gravity 1.008     Ph 5.5     Glucose Negative mg/dL      Ketones Negative mg/dL      Protein Negative mg/dL      Bilirubin Negative     Urobilinogen, Urine 1.0 EU/dL      Nitrite Negative     Leukocyte Esterase Negative     Occult Blood Negative     Micro Urine Req see below     Comment: Microscopic examination not performed when specimen is clear  and chemically negative for protein, blood, leukocyte esterase  and nitrite.                 Assessment:  Active Hospital Problems    Diagnosis     *Liver abscess [K75.0]     ACP (advance care planning) [Z71.89]     Hydronephrosis of right kidney [N13.30]     Elevated LFTs [R79.89]     Dyslipidemia [E78.5]     Non-Hodgkin's lymphoma (HCC) [C85.90]    Liver abscess  -s/p IR drain placement  Strep bacteremia  -blood cult +12/28  Leukocytosis     PLAN:   BCxs + strep intermedius 12/28  Repeat Bcxs every 48 hours until neg  TTE; CASANDRA if persistently positive blood cultures  Repeat CT scan 5-7 days from date of drain placement-planned for 1/3  Management drain per IR  Continue Unasyn  Wound care  Final antibiotic recommendations per culture results and clinical course    Midline/PICC TBD  Dispo TBD

## 2025-01-01 NOTE — PROGRESS NOTES
Radiology Progress Note   Author: HEATHER Osorio Date & Time created: 1/1/2025  8:08 AM   Date of admission  12/28/2024  Note to reader: this note follows the APSO format rather than the historical SOAP format. Assessment and plan located at the top of the note for ease of use.    Chief Complaint  69 y.o. male admitted 12/28/2024 with   Chief Complaint   Patient presents with    Sent from Urgent Care      wbc 24, fever, chills. Called patient who requested antibiotics however at this time there is no clear source of infection. He has a history of non-Hodgkin's lymphoma and reports new lymphadenopathy. Further work up is needed as fevers ar on going with no known source of infection. Advised patient to go to  ED further evaluation; he verbalized understanding and intention to go to ED.    Fever     X 3 days now. Pt states taking advil for fever however it comes right back.     Urinary Frequency     Pt endorses urinary urgency  has gotten much worse the last 3/4 days          HPI  69-year-old male with past medical history significant for non-Hodgkin's lymphoma, nephrolithiasis, HLD admitted 12/28/2024 for CT finding of right hepatic fluid collection concerning for abscess after presenting to the ER with fevers and chills.  IR was consulted and patient underwent CT-guided liver abscess drain placement with IR Dr. Chandler on 12/29/2024    Interval History:   12/30/2024 -hepatic abscess 12F drain to RUQ with 140 mL slightly turbid serosanguinous output in the last 24 hours. Labs reviewed; WBC 20.8, H&H 11.5/33.9, , , alk phos 126, creatinine 0.84.  Hepatic fluid cultures pending. Drain flushed with 10 mL NS. I reviewed IDT notes and images.    12/31/24 - Hepatic abscess 12F drain to RUQ with 68 mL seropurulent output in the last 24 hours. Labs reviewed; WBC 16.5, H&H 11.1/33.0, AST 52, , alk phos 125, creatinine 0.73.  Hepatic fluid cultures preliminarily positive for streptococcus  intermedius. Drain flushed with 10 mL NS. I reviewed IDT notes and images and discussed patient with Dr. Griffin.     01/01/25 - Hepatic abscess 12F drain to RUQ with 50 mL serosanguinous output in the last 24 hours. Labs reviewed; WBC 17.0, H&H 11.8/35.8, AST 48, ALT 91, alk phos 149, creatinine 0.78.  Hepatic fluid cultures positive for streptococcus intermedius, on abx through ID. Drain flushed with 20 mL NS with 10 mLs able to be aspirated back. I reviewed IDT notes.     Assessment/Plan     Principal Problem:    Liver abscess  Active Problems:    Non-Hodgkin's lymphoma (HCC)    Dyslipidemia    Hydronephrosis of right kidney    Elevated LFTs    ACP (advance care planning)      Plan IR  - Continue order to irrigate RUQ drain with 10 ml of sterile saline each shift  - Fluid cultures preliminarily positive for streptococcus intermedius  - ID following   - Recommend follow up CT scan in 5-7 days (01/03/25)  - Continue to monitor drains, VS, and labs    -  -Thank you for allowing Interventional Radiology team to participate in the patients care, if any additional care or requests are needed in the future please do not hesitate to call or place IR order           Review of Systems  Physical Exam   Review of Systems   Constitutional:  Negative for chills and fever.   Respiratory:  Negative for shortness of breath.    Cardiovascular:  Negative for chest pain and palpitations.   Gastrointestinal:  Positive for abdominal pain. Negative for nausea and vomiting.   Neurological:  Negative for weakness.      Vitals:    01/01/25 0724   BP: 139/78   Pulse: 78   Resp: 15   Temp: 37.7 °C (99.8 °F)   SpO2: 94%        Physical Exam  Cardiovascular:      Rate and Rhythm: Normal rate.   Pulmonary:      Effort: Pulmonary effort is normal.   Abdominal:      General: There is no distension.      Tenderness: There is abdominal tenderness.      Comments: IR drain to RUQ   Skin:     General: Skin is warm and dry.   Neurological:       General: No focal deficit present.      Mental Status: He is alert and oriented to person, place, and time.   Psychiatric:         Mood and Affect: Mood normal.         Behavior: Behavior normal.             Labs    Recent Labs     12/30/24  0158 12/31/24 0015 01/01/25 0319   WBC 20.8* 16.5* 17.0*   RBC 3.64* 3.56* 3.78*   HEMOGLOBIN 11.5* 11.1* 11.8*   HEMATOCRIT 33.9* 33.0* 35.8*   MCV 93.1 92.7 94.7   MCH 31.6 31.2 31.2   MCHC 33.9 33.6 33.0   RDW 42.5 42.8 42.9   PLATELETCT 197 210 260   MPV 9.3 9.0 9.1     Recent Labs     12/30/24 0158 12/31/24 0015 01/01/25 0319   SODIUM 135 138 136   POTASSIUM 3.5* 3.9 3.9   CHLORIDE 101 104 102   CO2 22 22 24   GLUCOSE 150* 109* 120*   BUN 21 22 15   CREATININE 0.84 0.73 0.78   CALCIUM 8.8 8.4* 8.5     Recent Labs     12/30/24 0158 12/31/24 0015 01/01/25 0319   ALBUMIN 3.2 3.1* 3.1*   TBILIRUBIN 1.1 0.6 0.5   ALKPHOSPHAT 126* 125* 149*   TOTPROTEIN 6.1 5.9* 6.0   ALTSGPT 159* 109* 91*   ASTSGOT 125* 52* 48*   CREATININE 0.84 0.73 0.78     CT-DRAIN-LIVER ABSCESS-CYST   Final Result      1.  CT guided placement of a percutaneous drainage catheter in a right hepatic abscess.   2.  The current plan is to obtain a follow-up CT in 5-7 days..      CT-ABDOMEN-PELVIS WITH   Final Result   Addendum (preliminary) 1 of 1   These findings were discussed with JAYDON BEYER on 12/28/2024    9:12 PM.      Final      1.  Large irregular heterogeneous low-attenuation lesion in the RIGHT lobe liver superiorly measuring 5.6 cm, most suggestive of abscess.   2.  Prominent pericaval, portacaval and debora hepatis lymph nodes, inflammatory versus neoplastic.   3.  Mild RIGHT hydronephrosis without obstructing ureteral stone demonstrated.   4.  Small nonobstructing LEFT kidney stone.      DX-CHEST-PORTABLE (1 VIEW)   Final Result      1.  No acute cardiopulmonary disease.   2.  Probable RIGHT lung base Nipple shadows.  Consider confirmation with nonemergent followup chest x-ray  "with nipple markers and shallow oblique views.           INR   Date Value Ref Range Status   12/29/2024 1.37 (H) 0.87 - 1.13 Final     Comment:     INR - Non-therapeutic Reference Range: 0.87-1.13  INR - Therapeutic Reference Range: 2.0-4.0       No results found for: \"POCINR\"     Intake/Output Summary (Last 24 hours) at 12/30/2024 1036  Last data filed at 12/30/2024 0948  Gross per 24 hour   Intake 3080 ml   Output 1690 ml   Net 1390 ml      I have personally reviewed the above labs and imaging      I have performed a physical exam and reviewed and updated ROS and Plan today (1/1/2025).     39 minutes in directly providing and coordinating care and extensive data review.  No time overlap and excludes procedures.   "

## 2025-01-02 ENCOUNTER — APPOINTMENT (OUTPATIENT)
Dept: RADIOLOGY | Facility: MEDICAL CENTER | Age: 70
DRG: 442 | End: 2025-01-02
Attending: STUDENT IN AN ORGANIZED HEALTH CARE EDUCATION/TRAINING PROGRAM
Payer: MEDICARE

## 2025-01-02 LAB
ALBUMIN SERPL BCP-MCNC: 2.9 G/DL (ref 3.2–4.9)
ALBUMIN/GLOB SERPL: 1 G/DL
ALP SERPL-CCNC: 161 U/L (ref 30–99)
ALT SERPL-CCNC: 76 U/L (ref 2–50)
ANION GAP SERPL CALC-SCNC: 11 MMOL/L (ref 7–16)
AST SERPL-CCNC: 45 U/L (ref 12–45)
BILIRUB SERPL-MCNC: 0.6 MG/DL (ref 0.1–1.5)
BUN SERPL-MCNC: 14 MG/DL (ref 8–22)
CALCIUM ALBUM COR SERPL-MCNC: 9.5 MG/DL (ref 8.5–10.5)
CALCIUM SERPL-MCNC: 8.6 MG/DL (ref 8.5–10.5)
CHLORIDE SERPL-SCNC: 104 MMOL/L (ref 96–112)
CO2 SERPL-SCNC: 23 MMOL/L (ref 20–33)
CREAT SERPL-MCNC: 0.73 MG/DL (ref 0.5–1.4)
ERYTHROCYTE [DISTWIDTH] IN BLOOD BY AUTOMATED COUNT: 42.4 FL (ref 35.9–50)
GFR SERPLBLD CREATININE-BSD FMLA CKD-EPI: 97 ML/MIN/1.73 M 2
GLOBULIN SER CALC-MCNC: 2.9 G/DL (ref 1.9–3.5)
GLUCOSE SERPL-MCNC: 116 MG/DL (ref 65–99)
HCT VFR BLD AUTO: 34 % (ref 42–52)
HGB BLD-MCNC: 11.3 G/DL (ref 14–18)
MCH RBC QN AUTO: 30.7 PG (ref 27–33)
MCHC RBC AUTO-ENTMCNC: 33.2 G/DL (ref 32.3–36.5)
MCV RBC AUTO: 92.4 FL (ref 81.4–97.8)
PLATELET # BLD AUTO: 291 K/UL (ref 164–446)
PMV BLD AUTO: 8.9 FL (ref 9–12.9)
POTASSIUM SERPL-SCNC: 3.5 MMOL/L (ref 3.6–5.5)
PROT SERPL-MCNC: 5.8 G/DL (ref 6–8.2)
RBC # BLD AUTO: 3.68 M/UL (ref 4.7–6.1)
SODIUM SERPL-SCNC: 138 MMOL/L (ref 135–145)
WBC # BLD AUTO: 18.6 K/UL (ref 4.8–10.8)

## 2025-01-02 PROCEDURE — 80053 COMPREHEN METABOLIC PANEL: CPT

## 2025-01-02 PROCEDURE — 99232 SBSQ HOSP IP/OBS MODERATE 35: CPT | Performed by: STUDENT IN AN ORGANIZED HEALTH CARE EDUCATION/TRAINING PROGRAM

## 2025-01-02 PROCEDURE — 74160 CT ABDOMEN W/CONTRAST: CPT

## 2025-01-02 PROCEDURE — 700111 HCHG RX REV CODE 636 W/ 250 OVERRIDE (IP): Mod: JZ | Performed by: INTERNAL MEDICINE

## 2025-01-02 PROCEDURE — 85027 COMPLETE CBC AUTOMATED: CPT

## 2025-01-02 PROCEDURE — 700101 HCHG RX REV CODE 250: Performed by: NURSE PRACTITIONER

## 2025-01-02 PROCEDURE — 700105 HCHG RX REV CODE 258: Performed by: INTERNAL MEDICINE

## 2025-01-02 PROCEDURE — 36415 COLL VENOUS BLD VENIPUNCTURE: CPT

## 2025-01-02 PROCEDURE — 770006 HCHG ROOM/CARE - MED/SURG/GYN SEMI*

## 2025-01-02 PROCEDURE — 99233 SBSQ HOSP IP/OBS HIGH 50: CPT | Performed by: INTERNAL MEDICINE

## 2025-01-02 PROCEDURE — 700102 HCHG RX REV CODE 250 W/ 637 OVERRIDE(OP): Performed by: INTERNAL MEDICINE

## 2025-01-02 PROCEDURE — A9270 NON-COVERED ITEM OR SERVICE: HCPCS | Performed by: INTERNAL MEDICINE

## 2025-01-02 PROCEDURE — 700117 HCHG RX CONTRAST REV CODE 255: Performed by: STUDENT IN AN ORGANIZED HEALTH CARE EDUCATION/TRAINING PROGRAM

## 2025-01-02 RX ADMIN — AMPICILLIN AND SULBACTAM 3 G: 1; 2 INJECTION, POWDER, FOR SOLUTION INTRAMUSCULAR; INTRAVENOUS at 17:10

## 2025-01-02 RX ADMIN — ENOXAPARIN SODIUM 40 MG: 100 INJECTION SUBCUTANEOUS at 17:07

## 2025-01-02 RX ADMIN — SODIUM CHLORIDE, PRESERVATIVE FREE 10 ML: 5 INJECTION INTRAVENOUS at 05:38

## 2025-01-02 RX ADMIN — ACYCLOVIR 800 MG: 800 TABLET ORAL at 20:36

## 2025-01-02 RX ADMIN — SENNOSIDES AND DOCUSATE SODIUM 2 TABLET: 50; 8.6 TABLET ORAL at 17:07

## 2025-01-02 RX ADMIN — AMPICILLIN AND SULBACTAM 3 G: 1; 2 INJECTION, POWDER, FOR SOLUTION INTRAMUSCULAR; INTRAVENOUS at 00:36

## 2025-01-02 RX ADMIN — AMPICILLIN AND SULBACTAM 3 G: 1; 2 INJECTION, POWDER, FOR SOLUTION INTRAMUSCULAR; INTRAVENOUS at 11:48

## 2025-01-02 RX ADMIN — ACETAMINOPHEN 650 MG: 325 TABLET ORAL at 20:36

## 2025-01-02 RX ADMIN — MONTELUKAST 10 MG: 10 TABLET, FILM COATED ORAL at 17:07

## 2025-01-02 RX ADMIN — AMPICILLIN AND SULBACTAM 3 G: 1; 2 INJECTION, POWDER, FOR SOLUTION INTRAMUSCULAR; INTRAVENOUS at 05:37

## 2025-01-02 RX ADMIN — AMPICILLIN AND SULBACTAM 3 G: 1; 2 INJECTION, POWDER, FOR SOLUTION INTRAMUSCULAR; INTRAVENOUS at 23:49

## 2025-01-02 RX ADMIN — IOHEXOL 75 ML: 350 INJECTION, SOLUTION INTRAVENOUS at 11:15

## 2025-01-02 RX ADMIN — ACYCLOVIR 800 MG: 800 TABLET ORAL at 08:49

## 2025-01-02 RX ADMIN — SODIUM CHLORIDE, PRESERVATIVE FREE 10 ML: 5 INJECTION INTRAVENOUS at 17:23

## 2025-01-02 ASSESSMENT — PAIN DESCRIPTION - PAIN TYPE
TYPE: ACUTE PAIN
TYPE: ACUTE PAIN

## 2025-01-02 ASSESSMENT — ENCOUNTER SYMPTOMS
FEVER: 0
NAUSEA: 0
WEAKNESS: 0
ABDOMINAL PAIN: 1
PALPITATIONS: 0
SHORTNESS OF BREATH: 0
VOMITING: 0
CHILLS: 0

## 2025-01-02 NOTE — CARE PLAN
The patient is Stable - Low risk of patient condition declining or worsening    Shift Goals  Clinical Goals: monitor DANISHA drain throughout shift  Patient Goals: comfort  Family Goals: updates to     Progress made toward(s) clinical / shift goals:  Patient understands plan of care and education given regarding care plan. Patient bed low and locked, patient uses call light appropriately, personal items are within reach. Plan of care is ongoing.    Patient is not progressing towards the following goals:

## 2025-01-02 NOTE — PROGRESS NOTES
Radiology Progress Note   Author: HEATHER Osorio Date & Time created: 1/2/2025  8:48 AM   Date of admission  12/28/2024  Note to reader: this note follows the APSO format rather than the historical SOAP format. Assessment and plan located at the top of the note for ease of use.    Chief Complaint  69 y.o. male admitted 12/28/2024 with   Chief Complaint   Patient presents with    Sent from Urgent Care      wbc 24, fever, chills. Called patient who requested antibiotics however at this time there is no clear source of infection. He has a history of non-Hodgkin's lymphoma and reports new lymphadenopathy. Further work up is needed as fevers ar on going with no known source of infection. Advised patient to go to  ED further evaluation; he verbalized understanding and intention to go to ED.    Fever     X 3 days now. Pt states taking advil for fever however it comes right back.     Urinary Frequency     Pt endorses urinary urgency  has gotten much worse the last 3/4 days          HPI  69-year-old male with past medical history significant for non-Hodgkin's lymphoma, nephrolithiasis, HLD admitted 12/28/2024 for CT finding of right hepatic fluid collection concerning for abscess after presenting to the ER with fevers and chills.  IR was consulted and patient underwent CT-guided liver abscess drain placement with IR Dr. Chandler on 12/29/2024    Interval History:   12/30/2024 -hepatic abscess 12F drain to RUQ with 140 mL slightly turbid serosanguinous output in the last 24 hours. Labs reviewed; WBC 20.8, H&H 11.5/33.9, , , alk phos 126, creatinine 0.84.  Hepatic fluid cultures pending. Drain flushed with 10 mL NS. I reviewed IDT notes and images.    12/31/24 - Hepatic abscess 12F drain to RUQ with 68 mL seropurulent output in the last 24 hours. Labs reviewed; WBC 16.5, H&H 11.1/33.0, AST 52, , alk phos 125, creatinine 0.73.  Hepatic fluid cultures preliminarily positive for streptococcus  intermedius. Drain flushed with 10 mL NS. I reviewed IDT notes and images and discussed patient with Dr. Griffin.     01/01/25 - Hepatic abscess 12F drain to RUQ with 50 mL serosanguinous output in the last 24 hours. Labs reviewed; WBC 17.0, H&H 11.8/35.8, AST 48, ALT 91, alk phos 149, creatinine 0.78.  Hepatic fluid cultures positive for streptococcus intermedius, on abx through ID. Drain flushed with 20 mL NS with 10 mLs able to be aspirated back. I reviewed IDT notes.     01/02/25 - Hepatic abscess 12F drain to RUQ with 27.5 mL serosanguinous output in the last 24 hours. Labs reviewed; WBC 18.6, H&H 11.3/34.0, AST 45, ALT 76, alk phos 161, creatinine 0.73.  Drain flushed with 20 mL NS with 10 mLs able to be aspirated back. I reviewed IDT notes and discussed POC with Dr Griffin.     Assessment/Plan     Principal Problem:    Liver abscess  Active Problems:    Non-Hodgkin's lymphoma (HCC)    Dyslipidemia    Hydronephrosis of right kidney    Elevated LFTs    ACP (advance care planning)    Bacteremia      Plan IR  - Continue order to irrigate RUQ drain with 10 ml of sterile saline each shift  - Fluid cultures positive for streptococcus intermedius  - ID following   - White count continues to slowly increase. Repeat CT abdomen ordered to eval abscess; blood cultures also ordered by hospitalist.   - Continue to monitor drains, VS, and labs    -  -Thank you for allowing Interventional Radiology team to participate in the patients care, if any additional care or requests are needed in the future please do not hesitate to call or place IR order           Review of Systems  Physical Exam   Review of Systems   Constitutional:  Negative for chills and fever.   Respiratory:  Negative for shortness of breath.    Cardiovascular:  Negative for chest pain and palpitations.   Gastrointestinal:  Positive for abdominal pain. Negative for nausea and vomiting.   Neurological:  Negative for weakness.      Vitals:    01/02/25 0711    BP: 139/77   Pulse: 78   Resp: 18   Temp: 36.6 °C (97.8 °F)   SpO2: 94%        Physical Exam  Cardiovascular:      Rate and Rhythm: Normal rate.   Pulmonary:      Effort: Pulmonary effort is normal.   Abdominal:      General: There is no distension.      Tenderness: There is abdominal tenderness.      Comments: IR drain to RUQ   Skin:     General: Skin is warm and dry.   Neurological:      General: No focal deficit present.      Mental Status: He is alert and oriented to person, place, and time.   Psychiatric:         Mood and Affect: Mood normal.         Behavior: Behavior normal.             Labs    Recent Labs     12/31/24  0015 01/01/25 0319 01/02/25  0035   WBC 16.5* 17.0* 18.6*   RBC 3.56* 3.78* 3.68*   HEMOGLOBIN 11.1* 11.8* 11.3*   HEMATOCRIT 33.0* 35.8* 34.0*   MCV 92.7 94.7 92.4   MCH 31.2 31.2 30.7   MCHC 33.6 33.0 33.2   RDW 42.8 42.9 42.4   PLATELETCT 210 260 291   MPV 9.0 9.1 8.9*     Recent Labs     12/31/24  0015 01/01/25 0319 01/02/25  0035   SODIUM 138 136 138   POTASSIUM 3.9 3.9 3.5*   CHLORIDE 104 102 104   CO2 22 24 23   GLUCOSE 109* 120* 116*   BUN 22 15 14   CREATININE 0.73 0.78 0.73   CALCIUM 8.4* 8.5 8.6     Recent Labs     12/31/24  0015 01/01/25 0319 01/02/25  0035   ALBUMIN 3.1* 3.1* 2.9*   TBILIRUBIN 0.6 0.5 0.6   ALKPHOSPHAT 125* 149* 161*   TOTPROTEIN 5.9* 6.0 5.8*   ALTSGPT 109* 91* 76*   ASTSGOT 52* 48* 45   CREATININE 0.73 0.78 0.73     CT-DRAIN-LIVER ABSCESS-CYST   Final Result      1.  CT guided placement of a percutaneous drainage catheter in a right hepatic abscess.   2.  The current plan is to obtain a follow-up CT in 5-7 days..      CT-ABDOMEN-PELVIS WITH   Final Result   Addendum (preliminary) 1 of 1   These findings were discussed with JAYDON BEYER on 12/28/2024    9:12 PM.      Final      1.  Large irregular heterogeneous low-attenuation lesion in the RIGHT lobe liver superiorly measuring 5.6 cm, most suggestive of abscess.   2.  Prominent pericaval,  "portacaval and debora hepatis lymph nodes, inflammatory versus neoplastic.   3.  Mild RIGHT hydronephrosis without obstructing ureteral stone demonstrated.   4.  Small nonobstructing LEFT kidney stone.      DX-CHEST-PORTABLE (1 VIEW)   Final Result      1.  No acute cardiopulmonary disease.   2.  Probable RIGHT lung base Nipple shadows.  Consider confirmation with nonemergent followup chest x-ray with nipple markers and shallow oblique views.           INR   Date Value Ref Range Status   12/29/2024 1.37 (H) 0.87 - 1.13 Final     Comment:     INR - Non-therapeutic Reference Range: 0.87-1.13  INR - Therapeutic Reference Range: 2.0-4.0       No results found for: \"POCINR\"     Intake/Output Summary (Last 24 hours) at 12/30/2024 1036  Last data filed at 12/30/2024 0948  Gross per 24 hour   Intake 3080 ml   Output 1690 ml   Net 1390 ml      I have personally reviewed the above labs and imaging      I have performed a physical exam and reviewed and updated ROS and Plan today (1/2/2025).     37 minutes in directly providing and coordinating care and extensive data review.  No time overlap and excludes procedures.   "

## 2025-01-02 NOTE — CARE PLAN
The patient is Stable - Low risk of patient condition declining or worsening    Shift Goals  Clinical Goals: will monitor Makayla drain throughout the shift  Patient Goals: rest  Family Goals: JOESPH    Progress made toward(s) clinical / shift goals:  output and makayla drain site monitored during site. Pt reports no signs of discomfort due to Makayla drain.     Patient is not progressing towards the following goals:

## 2025-01-02 NOTE — PROGRESS NOTES
Received report from day shift nurse. Pt is alert and oriented x4. Pt denies any pain at this time. Bed is locked and in the lowest position. Call light is within reach.

## 2025-01-02 NOTE — PROGRESS NOTES
[facility] Medicine Daily Progress Note    Date of Service  1/2/2025    Chief Complaint  Jet Webb is a 69 y.o. male admitted 12/28/2024 with fever and chills.    Hospital Course  Jet Webb is a 69-year-old male with PMHx non-Hodgkin's lymphoma, nephrolithiasis, HLD.  Admitted 12/28 for right sided liver abscesses.    Initially, patient presenting with fever, chills. CT of the abdomen pelvis with contrast: Right liver lobe lesion 5.6 cm most suggestive of abscess. Prominent pericaval, portacaval and debora hepatis lymph nodes. Mild right hydronephrosis without obstructing ureteral stones demonstrated.     Patient was started on IV Zosyn.  IR was consulted.  Patient underwent CT-guided drainage on 12/29.    Interval Problem Update  12/30: Vitals notable for Tmax 100.7.  Intermittent tachycardia.  SBP ranging 106 through 159.  DANISHA drain placed yesterday.  Wound cultures pending.    12/31: Patient remains afebrile.   through 144.  Resting comfortably on room air.  WBC 16.5 from 20.8.  Hb stable at 11.1.  LFTs downtrending.  Wound cultures positive for strep intermedius.  Discontinue IV Zosyn.  Start IV Unasyn.  Plan to repeat CT A/P on 1/3. Discussed with JUAN Quintana with IR at bedside. Plan to continue IV antibiotics and repeat CT A/P on 1/3.     1/1: Tmax 100.8 overnight.  COVID, flu, RSV negative.  WBC 17 from 16.5.  Hb stable at 11.8.  LFTs downtrending.  Repeat blood cultures ordered for today.  Planning for CT scan 1/3.    1/2: Tmax 101.4 overnight.  WBC 18.6 from 17.  Hb stable at 11.3.  Potassium 3.5; replaced.  LFTs continue to downtrend.  Repeat CT abdomen showing improvement in abscesses with drain in appropriate placement.  No plans for adjustment this time.  Continue IV Unasyn today.  Follow repeat blood cultures.  Repeat CBC in a.m.    I have discussed this patient's plan of care and discharge plan at IDT rounds today with Case Management, Nursing, Nursing leadership, and  other members of the IDT team.    Consultants/Specialty  infectious disease and IR    Code Status  Full Code    Disposition  The patient is not medically cleared for discharge to home or a post-acute facility.      I have placed the appropriate orders for post-discharge needs.    Review of Systems  Review of Systems   Constitutional:  Negative for fever and malaise/fatigue.   Respiratory:  Negative for shortness of breath.    Cardiovascular:  Negative for chest pain and leg swelling.   Gastrointestinal:  Positive for abdominal pain. Negative for nausea and vomiting.        Physical Exam  Temp:  [36.6 °C (97.8 °F)-38.6 °C (101.4 °F)] 36.6 °C (97.8 °F)  Pulse:  [71-85] 78  Resp:  [18] 18  BP: (126-146)/(72-81) 139/77  SpO2:  [94 %-100 %] 94 %    Physical Exam  Vitals and nursing note reviewed.   Constitutional:       General: He is not in acute distress.     Appearance: Normal appearance. He is not ill-appearing.   Cardiovascular:      Rate and Rhythm: Normal rate and regular rhythm.   Pulmonary:      Effort: Pulmonary effort is normal.      Breath sounds: Normal breath sounds.   Abdominal:      General: Bowel sounds are normal. There is no distension.      Palpations: Abdomen is soft.      Tenderness: There is abdominal tenderness.      Comments: DANISHA drain to epigastric region.  Serosanguineous drainage present   Skin:     General: Skin is warm and dry.   Neurological:      Mental Status: He is alert and oriented to person, place, and time. Mental status is at baseline.   Psychiatric:         Mood and Affect: Mood normal.         Behavior: Behavior normal.         Fluids    Intake/Output Summary (Last 24 hours) at 1/2/2025 1349  Last data filed at 1/2/2025 0900  Gross per 24 hour   Intake 480 ml   Output 27.5 ml   Net 452.5 ml        Laboratory  Recent Labs     12/31/24  0015 01/01/25  0319 01/02/25  0035   WBC 16.5* 17.0* 18.6*   RBC 3.56* 3.78* 3.68*   HEMOGLOBIN 11.1* 11.8* 11.3*   HEMATOCRIT 33.0* 35.8* 34.0*    MCV 92.7 94.7 92.4   MCH 31.2 31.2 30.7   MCHC 33.6 33.0 33.2   RDW 42.8 42.9 42.4   PLATELETCT 210 260 291   MPV 9.0 9.1 8.9*     Recent Labs     12/31/24  0015 01/01/25  0319 01/02/25  0035   SODIUM 138 136 138   POTASSIUM 3.9 3.9 3.5*   CHLORIDE 104 102 104   CO2 22 24 23   GLUCOSE 109* 120* 116*   BUN 22 15 14   CREATININE 0.73 0.78 0.73   CALCIUM 8.4* 8.5 8.6                     Imaging  CT-ABDOMEN WITH   Final Result         1. There are new small effusions and compressive atelectasis, right worse than left.   2. There has been interval percutaneous drainage of the hepatic abscess.   3. Small left renal stone.   4. Mild hepatosplenomegaly.   5. Diverticulosis.   6. 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               CT-DRAIN-LIVER ABSCESS-CYST   Final Result      1.  CT guided placement of a percutaneous drainage catheter in a right hepatic abscess.   2.  The current plan is to obtain a follow-up CT in 5-7 days..      CT-ABDOMEN-PELVIS WITH   Final Result   Addendum (preliminary) 1 of 1   These findings were discussed with JAYDON BEYER on 12/28/2024    9:12 PM.      Final      1.  Large irregular heterogeneous low-attenuation lesion in the RIGHT lobe liver superiorly measuring 5.6 cm, most suggestive of abscess.   2.  Prominent pericaval, portacaval and debora hepatis lymph nodes, inflammatory versus neoplastic.   3.  Mild RIGHT hydronephrosis without obstructing ureteral stone demonstrated.   4.  Small nonobstructing LEFT kidney stone.      DX-CHEST-PORTABLE (1 VIEW)   Final Result      1.  No acute cardiopulmonary disease.   2.  Probable RIGHT lung base Nipple shadows.  Consider confirmation with nonemergent followup chest x-ray with nipple markers and shallow oblique views.              Assessment/Plan  * Liver abscess- (present on admission)  Assessment & Plan  CT of the abdomen pelvis with contrast: Right liver lobe lesion 5.6 cm most suggestive of abscess.  S/p drain placement  12/29  Cultures growing strep intermedius  Repeat CT abdomen 1/2: Interval percutaneous drainage of hepatic abscess.  No other abscesses noted.  - Discontinue IV Zosyn  - Continue IV Unasyn  - ID following  - Repeat CMP and CBC in a.m.  - Continue drain management per IR recommendations    Bacteremia  Assessment & Plan  Blood cultures positive for strep intermedius 12/28  Repeat blood cultures 1/1 NGTD  - Repeat blood cultures every 48 hours until negative  - Infectious diseases following  - Continue IV Unasyn    ACP (advance care planning)  Assessment & Plan  Patient admitted with liver abscess.  History of non-Hodgkin lymphoma.  Age of 69.  I discussed the CODE STATUS with him and his  at bedside, including CPR, intubation/mechanical ventilation.  He wants to stay full code if there is no terminal diagnosis or mental inability.  Continue full code.  ACP 16 minutes.  Above per previous hospitalist     Elevated LFTs  Assessment & Plan  LFTs improving  Presumed secondary to liver abscess  Hepatitis panel negative  - Repeat CMP in a.m.    Hydronephrosis of right kidney  Assessment & Plan  Incidental finding on CT of the abdomen with contrast: Mild right hydronephrosis without obstructing ureteral stone.    Dyslipidemia- (present on admission)  Assessment & Plan  Will hold Lipitor due to elevated LFTs    Non-Hodgkin's lymphoma (HCC)- (present on admission)  Assessment & Plan  In remission for 4-5 years   per oncology note is low-grade, not on active treatments.    Continue  surveillance per Dr. Chen         VTE prophylaxis:   SCDs/TEDs   enoxaparin ppx      I have performed a physical exam and reviewed and updated ROS and Plan today (1/2/2025). In review of yesterday's note (1/1/2025), there are no changes except as documented above.

## 2025-01-02 NOTE — PROGRESS NOTES
Infectious Disease Progress Note    Author: Anca Russo M.D. Date & Time of service: 2025  3:09 PM    Chief Complaint:   liver abscess  Strep bacteremia    Interval History:   69 y.o. male admitted 2024 for fever and chills due to above above  25 AF WBC 17 In the bathroom again today-no acute events overnight  25 AF WBC 18.6 Had CT done this am-results reviewed and plan of care discussed  Labs Reviewed, Medications Reviewed, and Radiology Reviewed.    Review of Systems:  Review of Systems   Constitutional:  Negative for fever.   Respiratory:  Negative for shortness of breath.    Gastrointestinal:  Negative for nausea and vomiting.   All other systems reviewed and are negative.      Hemodynamics:  Temp (24hrs), Av.4 °C (99.4 °F), Min:36.6 °C (97.8 °F), Max:38.6 °C (101.4 °F)  Temperature: 36.6 °C (97.8 °F)  Pulse  Av.3  Min: 60  Max: 103   Blood Pressure : 139/77       Physical Exam:  Physical Exam  Vitals and nursing note reviewed.   Constitutional:       General: He is not in acute distress.     Appearance: He is not ill-appearing, toxic-appearing or diaphoretic.   Eyes:      General: No scleral icterus.  Cardiovascular:      Rate and Rhythm: Normal rate.   Pulmonary:      Effort: Pulmonary effort is normal. No respiratory distress.      Breath sounds: No stridor.   Abdominal:      General: There is no distension.      Comments: Drain serosanguinous   Skin:     Coloration: Skin is not jaundiced.   Neurological:      General: No focal deficit present.      Mental Status: He is alert and oriented to person, place, and time.         Meds:    Current Facility-Administered Medications:     ibuprofen    ampicillin-sulbactam (UNASYN) IV    normal saline flush    enoxaparin (LOVENOX) injection    acetaminophen    Notify provider if pain remains uncontrolled **AND** Use the Numeric Rating Scale (NRS), Walsh-Baker Faces (WBF), or FLACC on regular floors and Critical-Care Pain Observation  Tool (CPOT) on ICUs/Trauma to assess pain **AND** Pulse Ox **AND** Pharmacy Consult Request **AND** If patient difficult to arouse and/or has respiratory depression (respiratory rate of 10 or less), stop any opiates that are currently infusing and call a Rapid Response.    oxyCODONE immediate-release **OR** oxyCODONE immediate-release **OR** morphine injection    senna-docusate **AND** polyethylene glycol/lytes    labetalol    ondansetron    ondansetron    acyclovir    montelukast    Labs:  Recent Labs     12/31/24  0015 01/01/25  0319 01/02/25  0035   WBC 16.5* 17.0* 18.6*   RBC 3.56* 3.78* 3.68*   HEMOGLOBIN 11.1* 11.8* 11.3*   HEMATOCRIT 33.0* 35.8* 34.0*   MCV 92.7 94.7 92.4   MCH 31.2 31.2 30.7   RDW 42.8 42.9 42.4   PLATELETCT 210 260 291   MPV 9.0 9.1 8.9*     Recent Labs     12/31/24  0015 01/01/25 0319 01/02/25  0035   SODIUM 138 136 138   POTASSIUM 3.9 3.9 3.5*   CHLORIDE 104 102 104   CO2 22 24 23   GLUCOSE 109* 120* 116*   BUN 22 15 14     Recent Labs     12/31/24  0015 01/01/25  0319 01/02/25  0035   ALBUMIN 3.1* 3.1* 2.9*   TBILIRUBIN 0.6 0.5 0.6   ALKPHOSPHAT 125* 149* 161*   TOTPROTEIN 5.9* 6.0 5.8*   ALTSGPT 109* 91* 76*   ASTSGOT 52* 48* 45   CREATININE 0.73 0.78 0.73       Imaging:  CT-DRAIN-LIVER ABSCESS-CYST    Result Date: 12/29/2024 12/29/2024 11:48 AM HISTORY/REASON FOR EXAM:  SENT FROM URGENT CARE, FEVER, URINARY FREQUENCY TECHNIQUE/EXAM DESCRIPTION: Hepatic abscess drainage with CT guidance. Low dose optimization technique was utilized for this CT exam including automated exposure control and adjustment of the mA and/or kV according to patient size. PROCEDURE: Informed consent was obtained. Moderate sedation with Fentanyl and Versed was administered during the procedure with appropriate continuous patient monitoring by the radiology nurse. Intraservice duration: 20 minutes Localizing CT images were obtained with the patient in supine position. The skin was prepped with ChloraPrep and  draped in a sterile fashion. Following local anesthesia with 1% Lidocaine, a 17 G guiding needle was placed and needle path confirmed with CT. An Amplatz guidewire was placed and following serial tract dilatation, a 12 [place] pigtail locking catheter was placed. A specimen was collected and submitted for culture and sensitivity and Gram stain. Total of 40 mL bloody purulent fluid was drained. The catheter was secured to the skin and connected to suction bulb drainage. Final CT images were obtained documenting catheter position. The patient tolerated the procedure well with no evidence of complication. COMPARISON: None available. FINDINGS: The final CT images show satisfactory catheter position within the target collection.     1.  CT guided placement of a percutaneous drainage catheter in a right hepatic abscess. 2.  The current plan is to obtain a follow-up CT in 5-7 days..    CT-ABDOMEN-PELVIS WITH    Addendum Date: 12/28/2024    These findings were discussed with JAYDON BEYER on 12/28/2024 9:12 PM.    Result Date: 12/28/2024 12/28/2024 8:39 PM HISTORY/REASON FOR EXAM:  Fever, history of lymphoma and adenopathy.  COMPARISON: No prior studies available. FINDINGS: Lower Chest: Minimal dependent atelectasis. Liver: Heterogeneous irregular low-attenuation structure in the superior RIGHT lobe liver measuring 5.1 x 5.7 x 4.5 cm. Spleen: Enlarged measuring 14.4 cm. Pancreas: Unremarkable. Gallbladder: No calcified stones. Biliary: Nondilated. Adrenal glands: Normal. Kidneys: Small nonobstructing LEFT kidney stone.  RIGHT kidney shows mildly prominent collecting system and proximal ureter without calcified stone demonstrated.. Bowel: No bowel obstruction or focal bowel wall thickening. Lymph nodes: Multiple mildly enlarged  debora hepatis and portacaval lymph nodes measuring up to 16 mm short axis.  Mildly prominent pericaval lymph nodes. Vasculature: Unremarkable. Peritoneum: Unremarkable without ascites.  Musculoskeletal: Degenerative disc disease at L5-S1 with grade 2 anterolisthesis.  Probable L5 pars defects.  LEFT hip prosthesis. Pelvis: Bladder is unremarkable.  No dependent fluid.     1.  Large irregular heterogeneous low-attenuation lesion in the RIGHT lobe liver superiorly measuring 5.6 cm, most suggestive of abscess. 2.  Prominent pericaval, portacaval and debora hepatis lymph nodes, inflammatory versus neoplastic. 3.  Mild RIGHT hydronephrosis without obstructing ureteral stone demonstrated. 4.  Small nonobstructing LEFT kidney stone.    DX-CHEST-PORTABLE (1 VIEW)    Result Date: 12/28/2024 12/28/2024 7:57 PM HISTORY/REASON FOR EXAM:  Sepsis; sepsis. Fever. TECHNIQUE/EXAM DESCRIPTION AND NUMBER OF VIEWS: Single portable view of the chest. COMPARISON: None FINDINGS: Cardiomediastinal contour is within normal limits. No focal pulmonary consolidation. Probable nipple shadow of the RIGHT lung base. No pleural fluid collection or pneumothorax. No major bony abnormality is seen.     1.  No acute cardiopulmonary disease. 2.  Probable RIGHT lung base Nipple shadows.  Consider confirmation with nonemergent followup chest x-ray with nipple markers and shallow oblique views.       Micro:  Results       Procedure Component Value Units Date/Time    BLOOD CULTURE [[id number]] Collected: 01/01/25 1612    Order Status: Completed Specimen: Blood from Peripheral Updated: 01/01/25 2008     Significant Indicator NEG     Source BLD     Site Peripheral     Culture Result No Growth  Note: Blood cultures are incubated for 5 days and  are monitored continuously.Positive blood cultures  are called to the RN and reported as soon as  they are identified.      BLOOD CULTURE [[id number]] Collected: 01/01/25 1612    Order Status: Completed Specimen: Blood from Peripheral Updated: 01/01/25 2008     Significant Indicator NEG     Source BLD     Site Peripheral     Culture Result No Growth  Note: Blood cultures are incubated for 5 days  and  are monitored continuously.Positive blood cultures  are called to the RN and reported as soon as  they are identified.      Blood Culture - Draw one from central line and one from peripheral site [[id number]]  (Abnormal) Collected: 12/28/24 1942    Order Status: Completed Specimen: Blood from Line Updated: 01/01/25 0719     Significant Indicator POS     Source BLD     Site Peripheral     Culture Result Growth detected by automated blood culture system.  12/29/2024  22:49        Streptococcus intermedius  See previous culture for sensitivity report.      E-Test [[id number]] Collected: 12/28/24 1942    Order Status: Completed Specimen: Other Updated: 01/01/25 0719     ETEST Sensitivity FINAL    Narrative:      61 tel. [id number] 12/29/2024, 18:25, RB PERF. RESULTS CALLED TO:[id number]    Blood Culture - Draw one from central line and one from peripheral site [[id number]]  (Abnormal)  (Susceptibility) Collected: 12/28/24 1942    Order Status: Completed Specimen: Blood from Peripheral Updated: 01/01/25 0719     Significant Indicator POS     Source BLD     Site PERIPHERAL     Culture Result Growth detected by automated blood culture system. 12/29/2024  18:22        Streptococcus intermedius    Susceptibility       Streptococcus intermedius (1)       Antibiotic Interpretation Microscan   Method Status    Cefotaxime Sensitive 0.094 mcg/mL E-TEST Final                       CoV-2, Flu A/B, And RSV by PCR ([employer]) [[id number]] Collected: 12/31/24 1616    Order Status: Completed Specimen: Respirate from Nasopharyngeal Updated: 12/31/24 1718     Influenza virus A RNA Negative     Influenza virus B, PCR Negative     RSV, PCR Negative     SARS-CoV-2 by PCR NotDetected     Comment: [facility] providers: PLEASE REFER TO DE-ESCALATION AND RETESTING PROTOCOL  on [email or web address]    **The [employer] GeneXpert Xpress SARS-CoV-2 RT-PCR Test has been made  available for use under the Emergency Use Authorization (EUA) only.          SARS-CoV-2  Source NP Swab    CULTURE WOUND W/ GRAM STAIN [[id number]]  (Abnormal) Collected: 12/29/24 1215    Order Status: Completed Specimen: Wound Updated: 12/31/24 0942     Significant Indicator POS     Source WND     Site Liver abscess     Culture Result -     Gram Stain Result Many WBCs.  Many Gram positive cocci in chains.       Culture Result Streptococcus intermedius  Heavy growth      Urine Culture (New) [[id number]] Collected: 12/28/24 2011    Order Status: Completed Specimen: Urine Updated: 12/30/24 0714     Significant Indicator NEG     Source UR     Site -     Culture Result No growth at 48 hours.    GRAM STAIN [[id number]] Collected: 12/29/24 1215    Order Status: Completed Specimen: Wound Updated: 12/29/24 1812     Significant Indicator .     Source WND     Site Liver abscess     Gram Stain Result Many WBCs.  Many Gram positive cocci in chains.      FLUID CULTURE W/GRAM STAIN [[id number]]     Order Status: Canceled Specimen: Other Body Fluid     Urinalysis [[id number]] Collected: 12/28/24 2011    Order Status: Completed Specimen: Urine Updated: 12/28/24 2023     Color Yellow     Character Clear     Specific Gravity 1.008     Ph 5.5     Glucose Negative mg/dL      Ketones Negative mg/dL      Protein Negative mg/dL      Bilirubin Negative     Urobilinogen, Urine 1.0 EU/dL      Nitrite Negative     Leukocyte Esterase Negative     Occult Blood Negative     Micro Urine Req see below     Comment: Microscopic examination not performed when specimen is clear  and chemically negative for protein, blood, leukocyte esterase  and nitrite.                 Assessment:  Active Hospital Problems    Diagnosis     *Liver abscess [K75.0]     ACP (advance care planning) [Z71.89]     Hydronephrosis of right kidney [N13.30]     Elevated LFTs [R79.89]     Dyslipidemia [E78.5]     Non-Hodgkin's lymphoma (HCC) [C85.90]    Liver abscess  -Initial CT Heterogeneous irregular low-attenuation structure in the superior RIGHT lobe liver measuring  5.1 x 5.7 x 4.5 cm  -s/p IR drain placement  -CT 1/2 Reidentified is an area of ill-defined low-attenuation in the liver centrally. This measures 7.2 x 6.5 x 5.7 cm. There is a pigtail drainage catheter in this region on the current exam along with some new gas. Liver is enlarged measuring 19.1 cm   Strep bacteremia  -blood cult +12/28  Leukocytosis  NHL-given h/o NHL consider biopsy lymph node  -+HSM     PLAN:   BCxs + strep intermedius 12/28  Repeat Bcxs every 48 hours until neg-blood cultures 1/1 neg to date  TTE; CASANDRA if persistently positive blood cultures  Repeat CT scan done today due to increased WBC-compared to initial CT increased size abscess-not mentioned in impression  Management drain per IR-may need upsize vs surgical intervention  Continue Unasyn  Wound care  Final antibiotic recommendations per culture results and clinical course    Midline yes once blood cultures negative  Dispo TBD

## 2025-01-03 ENCOUNTER — APPOINTMENT (OUTPATIENT)
Dept: RADIOLOGY | Facility: MEDICAL CENTER | Age: 70
DRG: 442 | End: 2025-01-03
Attending: NURSE PRACTITIONER
Payer: MEDICARE

## 2025-01-03 LAB
ALBUMIN SERPL BCP-MCNC: 2.9 G/DL (ref 3.2–4.9)
ALBUMIN/GLOB SERPL: 1 G/DL
ALP SERPL-CCNC: 139 U/L (ref 30–99)
ALT SERPL-CCNC: 54 U/L (ref 2–50)
ANION GAP SERPL CALC-SCNC: 9 MMOL/L (ref 7–16)
AST SERPL-CCNC: 23 U/L (ref 12–45)
BILIRUB SERPL-MCNC: 0.4 MG/DL (ref 0.1–1.5)
BUN SERPL-MCNC: 13 MG/DL (ref 8–22)
CALCIUM ALBUM COR SERPL-MCNC: 9.3 MG/DL (ref 8.5–10.5)
CALCIUM SERPL-MCNC: 8.4 MG/DL (ref 8.5–10.5)
CHLORIDE SERPL-SCNC: 104 MMOL/L (ref 96–112)
CO2 SERPL-SCNC: 27 MMOL/L (ref 20–33)
CREAT SERPL-MCNC: 0.66 MG/DL (ref 0.5–1.4)
ERYTHROCYTE [DISTWIDTH] IN BLOOD BY AUTOMATED COUNT: 42.8 FL (ref 35.9–50)
GFR SERPLBLD CREATININE-BSD FMLA CKD-EPI: 100 ML/MIN/1.73 M 2
GLOBULIN SER CALC-MCNC: 2.9 G/DL (ref 1.9–3.5)
GLUCOSE SERPL-MCNC: 106 MG/DL (ref 65–99)
HCT VFR BLD AUTO: 34.4 % (ref 42–52)
HGB BLD-MCNC: 11 G/DL (ref 14–18)
MCH RBC QN AUTO: 30.1 PG (ref 27–33)
MCHC RBC AUTO-ENTMCNC: 32 G/DL (ref 32.3–36.5)
MCV RBC AUTO: 94.2 FL (ref 81.4–97.8)
PLATELET # BLD AUTO: 347 K/UL (ref 164–446)
PMV BLD AUTO: 8.8 FL (ref 9–12.9)
POTASSIUM SERPL-SCNC: 3.6 MMOL/L (ref 3.6–5.5)
PROT SERPL-MCNC: 5.8 G/DL (ref 6–8.2)
RBC # BLD AUTO: 3.65 M/UL (ref 4.7–6.1)
SODIUM SERPL-SCNC: 140 MMOL/L (ref 135–145)
WBC # BLD AUTO: 20.4 K/UL (ref 4.8–10.8)

## 2025-01-03 PROCEDURE — 700101 HCHG RX REV CODE 250: Performed by: NURSE PRACTITIONER

## 2025-01-03 PROCEDURE — 49423 EXCHANGE DRAINAGE CATHETER: CPT

## 2025-01-03 PROCEDURE — A9270 NON-COVERED ITEM OR SERVICE: HCPCS | Performed by: INTERNAL MEDICINE

## 2025-01-03 PROCEDURE — 99233 SBSQ HOSP IP/OBS HIGH 50: CPT | Performed by: INTERNAL MEDICINE

## 2025-01-03 PROCEDURE — 700102 HCHG RX REV CODE 250 W/ 637 OVERRIDE(OP): Performed by: INTERNAL MEDICINE

## 2025-01-03 PROCEDURE — 700111 HCHG RX REV CODE 636 W/ 250 OVERRIDE (IP): Mod: JZ | Performed by: INTERNAL MEDICINE

## 2025-01-03 PROCEDURE — 700105 HCHG RX REV CODE 258: Performed by: INTERNAL MEDICINE

## 2025-01-03 PROCEDURE — 36415 COLL VENOUS BLD VENIPUNCTURE: CPT

## 2025-01-03 PROCEDURE — 80053 COMPREHEN METABOLIC PANEL: CPT

## 2025-01-03 PROCEDURE — 700117 HCHG RX CONTRAST REV CODE 255: Performed by: NURSE PRACTITIONER

## 2025-01-03 PROCEDURE — 99232 SBSQ HOSP IP/OBS MODERATE 35: CPT | Performed by: STUDENT IN AN ORGANIZED HEALTH CARE EDUCATION/TRAINING PROGRAM

## 2025-01-03 PROCEDURE — 85027 COMPLETE CBC AUTOMATED: CPT

## 2025-01-03 PROCEDURE — 700111 HCHG RX REV CODE 636 W/ 250 OVERRIDE (IP)

## 2025-01-03 PROCEDURE — 0F20X0Z CHANGE DRAINAGE DEVICE IN LIVER, EXTERNAL APPROACH: ICD-10-PCS | Performed by: RADIOLOGY

## 2025-01-03 PROCEDURE — 770006 HCHG ROOM/CARE - MED/SURG/GYN SEMI*

## 2025-01-03 RX ORDER — MIDAZOLAM HYDROCHLORIDE 1 MG/ML
.5-2 INJECTION INTRAMUSCULAR; INTRAVENOUS PRN
Status: ACTIVE | OUTPATIENT
Start: 2025-01-03 | End: 2025-01-03

## 2025-01-03 RX ORDER — ONDANSETRON 2 MG/ML
4 INJECTION INTRAMUSCULAR; INTRAVENOUS PRN
Status: ACTIVE | OUTPATIENT
Start: 2025-01-03 | End: 2025-01-03

## 2025-01-03 RX ORDER — LIDOCAINE HYDROCHLORIDE 10 MG/ML
INJECTION, SOLUTION INFILTRATION; PERINEURAL
Status: COMPLETED
Start: 2025-01-03 | End: 2025-01-03

## 2025-01-03 RX ORDER — SODIUM CHLORIDE 9 MG/ML
500 INJECTION, SOLUTION INTRAVENOUS
Status: ACTIVE | OUTPATIENT
Start: 2025-01-03 | End: 2025-01-03

## 2025-01-03 RX ADMIN — MONTELUKAST 10 MG: 10 TABLET, FILM COATED ORAL at 16:45

## 2025-01-03 RX ADMIN — AMPICILLIN AND SULBACTAM 3 G: 1; 2 INJECTION, POWDER, FOR SOLUTION INTRAMUSCULAR; INTRAVENOUS at 16:54

## 2025-01-03 RX ADMIN — SODIUM CHLORIDE, PRESERVATIVE FREE 10 ML: 5 INJECTION INTRAVENOUS at 05:23

## 2025-01-03 RX ADMIN — ENOXAPARIN SODIUM 40 MG: 100 INJECTION SUBCUTANEOUS at 16:44

## 2025-01-03 RX ADMIN — AMPICILLIN AND SULBACTAM 3 G: 1; 2 INJECTION, POWDER, FOR SOLUTION INTRAMUSCULAR; INTRAVENOUS at 05:23

## 2025-01-03 RX ADMIN — ACYCLOVIR 800 MG: 800 TABLET ORAL at 09:38

## 2025-01-03 RX ADMIN — ACYCLOVIR 800 MG: 800 TABLET ORAL at 21:10

## 2025-01-03 RX ADMIN — LIDOCAINE HYDROCHLORIDE: 10 INJECTION, SOLUTION INFILTRATION; PERINEURAL at 14:00

## 2025-01-03 RX ADMIN — AMPICILLIN AND SULBACTAM 3 G: 1; 2 INJECTION, POWDER, FOR SOLUTION INTRAMUSCULAR; INTRAVENOUS at 11:38

## 2025-01-03 RX ADMIN — IOHEXOL 10 ML: 300 INJECTION, SOLUTION INTRAVENOUS at 14:30

## 2025-01-03 RX ADMIN — SODIUM CHLORIDE, PRESERVATIVE FREE 10 ML: 5 INJECTION INTRAVENOUS at 16:55

## 2025-01-03 ASSESSMENT — PATIENT HEALTH QUESTIONNAIRE - PHQ9
2. FEELING DOWN, DEPRESSED, IRRITABLE, OR HOPELESS: NOT AT ALL
1. LITTLE INTEREST OR PLEASURE IN DOING THINGS: NOT AT ALL
SUM OF ALL RESPONSES TO PHQ9 QUESTIONS 1 AND 2: 0

## 2025-01-03 ASSESSMENT — ENCOUNTER SYMPTOMS
NAUSEA: 0
WEAKNESS: 0
SHORTNESS OF BREATH: 0
PALPITATIONS: 0
FEVER: 0
ABDOMINAL PAIN: 1
VOMITING: 0
CHILLS: 0

## 2025-01-03 ASSESSMENT — PAIN DESCRIPTION - PAIN TYPE
TYPE: ACUTE PAIN
TYPE: ACUTE PAIN

## 2025-01-03 NOTE — DISCHARGE PLANNING
"HTH/SCP TCN chart review completed. Current discharge considerations are home with home health and home infusion if needed.  Noted per ID note on1/1, \"Final antibiotic recommendations per culture results and clinical course\" with disposition \"TBD\".  Per kardex patient amublated 220 feet with no AD or assist and is on RA.     TCN will continue to follow and collaborate with discharge planning team as additional post acute needs arise. Thank you.    Completed:  Choice obtained: HH (1. [facility] , 2. [facility]) & Infusion ([facility]).   Pt aware of [facility]'s blanket referral policy  SCP with [facility] PCP. Patient has PCP f/u scheduled for 1/6  "

## 2025-01-03 NOTE — PROGRESS NOTES
Radiology Progress Note   Author: HEATHER Osorio Date & Time created: 1/3/2025  10:05 AM   Date of admission  12/28/2024  Note to reader: this note follows the APSO format rather than the historical SOAP format. Assessment and plan located at the top of the note for ease of use.    Chief Complaint  69 y.o. male admitted 12/28/2024 with   Chief Complaint   Patient presents with    Sent from Urgent Care      wbc 24, fever, chills. Called patient who requested antibiotics however at this time there is no clear source of infection. He has a history of non-Hodgkin's lymphoma and reports new lymphadenopathy. Further work up is needed as fevers ar on going with no known source of infection. Advised patient to go to  ED further evaluation; he verbalized understanding and intention to go to ED.    Fever     X 3 days now. Pt states taking advil for fever however it comes right back.     Urinary Frequency     Pt endorses urinary urgency  has gotten much worse the last 3/4 days          HPI  69-year-old male with past medical history significant for non-Hodgkin's lymphoma, nephrolithiasis, HLD admitted 12/28/2024 for CT finding of right hepatic fluid collection concerning for abscess after presenting to the ER with fevers and chills.  IR was consulted and patient underwent CT-guided liver abscess drain placement with IR Dr. Chanlder on 12/29/2024    Interval History:   12/30/2024 -hepatic abscess 12F drain to RUQ with 140 mL slightly turbid serosanguinous output in the last 24 hours. Labs reviewed; WBC 20.8, H&H 11.5/33.9, , , alk phos 126, creatinine 0.84.  Hepatic fluid cultures pending. Drain flushed with 10 mL NS. I reviewed IDT notes and images.    12/31/24 - Hepatic abscess 12F drain to RUQ with 68 mL seropurulent output in the last 24 hours. Labs reviewed; WBC 16.5, H&H 11.1/33.0, AST 52, , alk phos 125, creatinine 0.73.  Hepatic fluid cultures preliminarily positive for streptococcus  intermedius. Drain flushed with 10 mL NS. I reviewed IDT notes and images and discussed patient with Dr. Griffin.     01/01/25 - Hepatic abscess 12F drain to RUQ with 50 mL serosanguinous output in the last 24 hours. Labs reviewed; WBC 17.0, H&H 11.8/35.8, AST 48, ALT 91, alk phos 149, creatinine 0.78.  Hepatic fluid cultures positive for streptococcus intermedius, on abx through ID. Drain flushed with 20 mL NS with 10 mLs able to be aspirated back. I reviewed IDT notes.     01/02/25 - Hepatic abscess 12F drain to RUQ with 27.5 mL serosanguinous output in the last 24 hours. Labs reviewed; WBC 18.6, H&H 11.3/34.0, AST 45, ALT 76, alk phos 161, creatinine 0.73.  Drain flushed with 20 mL NS with 10 mLs able to be aspirated back. I reviewed IDT notes and discussed POC with Dr Griffin.     01/03/25 - Hepatic abscess 12F drain to RUQ with 10 mL serosanguinous output in the last 24 hours. Labs reviewed; WBC 20.4, H&H 11.0/34.4, AST 23, ALT 54, alk phos 139, creatinine 0.66.  I reviewed IDT notes and discussed POC with Dr Griffin.     Assessment/Plan     Principal Problem:    Liver abscess  Active Problems:    Non-Hodgkin's lymphoma (HCC)    Dyslipidemia    Hydronephrosis of right kidney    Elevated LFTs    ACP (advance care planning)    Bacteremia      Plan IR  - Continue order to irrigate RUQ drain with 10 ml of sterile saline each shift  - Fluid cultures positive for streptococcus intermedius  - ID following   - CT 01/02/25 shows slightly improved hepatic abscess with drain in correct place. Concern for loculation given low output. Order placed to exchange drain.   - Continue to monitor drains, VS, and labs    -  -Thank you for allowing Interventional Radiology team to participate in the patients care, if any additional care or requests are needed in the future please do not hesitate to call or place IR order           Review of Systems  Physical Exam   Review of Systems   Constitutional:  Negative for chills and  fever.   Respiratory:  Negative for shortness of breath.    Cardiovascular:  Negative for chest pain and palpitations.   Gastrointestinal:  Positive for abdominal pain. Negative for nausea and vomiting.   Neurological:  Negative for weakness.      Vitals:    01/03/25 0739   BP: 133/74   Pulse: 66   Resp: 18   Temp: 36.7 °C (98 °F)   SpO2: 95%        Physical Exam  Cardiovascular:      Rate and Rhythm: Normal rate.   Pulmonary:      Effort: Pulmonary effort is normal.   Abdominal:      General: There is no distension.      Tenderness: There is abdominal tenderness.      Comments: IR drain to RUQ   Skin:     General: Skin is warm and dry.   Neurological:      General: No focal deficit present.      Mental Status: He is alert and oriented to person, place, and time.   Psychiatric:         Mood and Affect: Mood normal.         Behavior: Behavior normal.           Labs    Recent Labs     01/01/25 0319 01/02/25 0035 01/03/25  0031   WBC 17.0* 18.6* 20.4*   RBC 3.78* 3.68* 3.65*   HEMOGLOBIN 11.8* 11.3* 11.0*   HEMATOCRIT 35.8* 34.0* 34.4*   MCV 94.7 92.4 94.2   MCH 31.2 30.7 30.1   MCHC 33.0 33.2 32.0*   RDW 42.9 42.4 42.8   PLATELETCT 260 291 347   MPV 9.1 8.9* 8.8*     Recent Labs     01/01/25 0319 01/02/25  0035 01/03/25  0031   SODIUM 136 138 140   POTASSIUM 3.9 3.5* 3.6   CHLORIDE 102 104 104   CO2 24 23 27   GLUCOSE 120* 116* 106*   BUN 15 14 13   CREATININE 0.78 0.73 0.66   CALCIUM 8.5 8.6 8.4*     Recent Labs     01/01/25 0319 01/02/25  0035 01/03/25  0031   ALBUMIN 3.1* 2.9* 2.9*   TBILIRUBIN 0.5 0.6 0.4   ALKPHOSPHAT 149* 161* 139*   TOTPROTEIN 6.0 5.8* 5.8*   ALTSGPT 91* 76* 54*   ASTSGOT 48* 45 23   CREATININE 0.78 0.73 0.66     CT-ABDOMEN WITH   Final Result         1. There are new small effusions and compressive atelectasis, right worse than left.   2. There has been interval percutaneous drainage of the hepatic abscess.   3. Small left renal stone.   4. Mild hepatosplenomegaly.   5. Diverticulosis.  "  6. 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               CT-DRAIN-LIVER ABSCESS-CYST   Final Result      1.  CT guided placement of a percutaneous drainage catheter in a right hepatic abscess.   2.  The current plan is to obtain a follow-up CT in 5-7 days..      CT-ABDOMEN-PELVIS WITH   Final Result   Addendum (preliminary) 1 of 1   These findings were discussed with JAYDON BEYER on 12/28/2024    9:12 PM.      Final      1.  Large irregular heterogeneous low-attenuation lesion in the RIGHT lobe liver superiorly measuring 5.6 cm, most suggestive of abscess.   2.  Prominent pericaval, portacaval and debora hepatis lymph nodes, inflammatory versus neoplastic.   3.  Mild RIGHT hydronephrosis without obstructing ureteral stone demonstrated.   4.  Small nonobstructing LEFT kidney stone.      DX-CHEST-PORTABLE (1 VIEW)   Final Result      1.  No acute cardiopulmonary disease.   2.  Probable RIGHT lung base Nipple shadows.  Consider confirmation with nonemergent followup chest x-ray with nipple markers and shallow oblique views.         IR-DRAINAGE-CATH EXCHANGE    (Results Pending)     INR   Date Value Ref Range Status   12/29/2024 1.37 (H) 0.87 - 1.13 Final     Comment:     INR - Non-therapeutic Reference Range: 0.87-1.13  INR - Therapeutic Reference Range: 2.0-4.0       No results found for: \"POCINR\"     Intake/Output Summary (Last 24 hours) at 12/30/2024 1036  Last data filed at 12/30/2024 0948  Gross per 24 hour   Intake 3080 ml   Output 1690 ml   Net 1390 ml      I have personally reviewed the above labs and imaging      I have performed a physical exam and reviewed and updated ROS and Plan today (1/3/2025).     36 minutes in directly providing and coordinating care and extensive data review.  No time overlap and excludes procedures.   "

## 2025-01-03 NOTE — PROGRESS NOTES
Infectious Disease Progress Note    Author: Anca Russo M.D. Date & Time of service: 1/3/2025  1:44 PM    Chief Complaint:   liver abscess  Strep bacteremia    Interval History:   69 y.o. male admitted 2024 for fever and chills due to above above  25 AF WBC 17 In the bathroom again today-no acute events overnight  25 AF WBC 18.6 Had CT done this am-results reviewed and plan of care discussed  1/3 AF WBC 20.4 planned for new drain today Up to chair-NAD  Labs Reviewed, Medications Reviewed, and Radiology Reviewed.    Review of Systems:  Review of Systems   Constitutional:  Negative for fever.   Respiratory:  Negative for shortness of breath.    Gastrointestinal:  Negative for nausea and vomiting.   All other systems reviewed and are negative.      Hemodynamics:  Temp (24hrs), Av.7 °C (98.1 °F), Min:36.4 °C (97.6 °F), Max:37.1 °C (98.7 °F)  Temperature: 36.7 °C (98 °F)  Pulse  Av.8  Min: 60  Max: 103   Blood Pressure : 133/74       Physical Exam:  Physical Exam  Vitals and nursing note reviewed.   Constitutional:       General: He is not in acute distress.     Appearance: He is not ill-appearing, toxic-appearing or diaphoretic.   Eyes:      General: No scleral icterus.     Extraocular Movements: Extraocular movements intact.      Pupils: Pupils are equal, round, and reactive to light.   Cardiovascular:      Rate and Rhythm: Normal rate.   Pulmonary:      Effort: Pulmonary effort is normal. No respiratory distress.      Breath sounds: No stridor.   Abdominal:      General: There is no distension.      Comments: Drain serosanguinous   Skin:     Coloration: Skin is not jaundiced.   Neurological:      General: No focal deficit present.      Mental Status: He is alert and oriented to person, place, and time.         Meds:    Current Facility-Administered Medications:     ibuprofen    ampicillin-sulbactam (UNASYN) IV    normal saline flush    enoxaparin (LOVENOX) injection    acetaminophen     Notify provider if pain remains uncontrolled **AND** Use the Numeric Rating Scale (NRS), Walsh-Baker Faces (WBF), or FLACC on regular floors and Critical-Care Pain Observation Tool (CPOT) on ICUs/Trauma to assess pain **AND** Pulse Ox **AND** Pharmacy Consult Request **AND** If patient difficult to arouse and/or has respiratory depression (respiratory rate of 10 or less), stop any opiates that are currently infusing and call a Rapid Response.    oxyCODONE immediate-release **OR** oxyCODONE immediate-release **OR** morphine injection    senna-docusate **AND** polyethylene glycol/lytes    labetalol    ondansetron    ondansetron    acyclovir    montelukast    Labs:  Recent Labs     01/01/25 0319 01/02/25 0035 01/03/25  0031   WBC 17.0* 18.6* 20.4*   RBC 3.78* 3.68* 3.65*   HEMOGLOBIN 11.8* 11.3* 11.0*   HEMATOCRIT 35.8* 34.0* 34.4*   MCV 94.7 92.4 94.2   MCH 31.2 30.7 30.1   RDW 42.9 42.4 42.8   PLATELETCT 260 291 347   MPV 9.1 8.9* 8.8*     Recent Labs     01/01/25 0319 01/02/25  0035 01/03/25  0031   SODIUM 136 138 140   POTASSIUM 3.9 3.5* 3.6   CHLORIDE 102 104 104   CO2 24 23 27   GLUCOSE 120* 116* 106*   BUN 15 14 13     Recent Labs     01/01/25 0319 01/02/25  0035 01/03/25  0031   ALBUMIN 3.1* 2.9* 2.9*   TBILIRUBIN 0.5 0.6 0.4   ALKPHOSPHAT 149* 161* 139*   TOTPROTEIN 6.0 5.8* 5.8*   ALTSGPT 91* 76* 54*   ASTSGOT 48* 45 23   CREATININE 0.78 0.73 0.66       Imaging:  CT-DRAIN-LIVER ABSCESS-CYST    Result Date: 12/29/2024 12/29/2024 11:48 AM HISTORY/REASON FOR EXAM:  SENT FROM URGENT CARE, FEVER, URINARY FREQUENCY TECHNIQUE/EXAM DESCRIPTION: Hepatic abscess drainage with CT guidance. Low dose optimization technique was utilized for this CT exam including automated exposure control and adjustment of the mA and/or kV according to patient size. PROCEDURE: Informed consent was obtained. Moderate sedation with Fentanyl and Versed was administered during the procedure with appropriate continuous patient monitoring  by the radiology nurse. Intraservice duration: 20 minutes Localizing CT images were obtained with the patient in supine position. The skin was prepped with ChloraPrep and draped in a sterile fashion. Following local anesthesia with 1% Lidocaine, a 17 G guiding needle was placed and needle path confirmed with CT. An Amplatz guidewire was placed and following serial tract dilatation, a 12 Gren[place] pigtail locking catheter was placed. A specimen was collected and submitted for culture and sensitivity and Gram stain. Total of 40 mL bloody purulent fluid was drained. The catheter was secured to the skin and connected to suction bulb drainage. Final CT images were obtained documenting catheter position. The patient tolerated the procedure well with no evidence of complication. COMPARISON: None available. FINDINGS: The final CT images show satisfactory catheter position within the target collection.     1.  CT guided placement of a percutaneous drainage catheter in a right hepatic abscess. 2.  The current plan is to obtain a follow-up CT in 5-7 days..    CT-ABDOMEN-PELVIS WITH    Addendum Date: 12/28/2024    These findings were discussed with JAYDON BEYER on 12/28/2024 9:12 PM.    Result Date: 12/28/2024 12/28/2024 8:39 PM HISTORY/REASON FOR EXAM:  Fever, history of lymphoma and adenopathy.  COMPARISON: No prior studies available. FINDINGS: Lower Chest: Minimal dependent atelectasis. Liver: Heterogeneous irregular low-attenuation structure in the superior RIGHT lobe liver measuring 5.1 x 5.7 x 4.5 cm. Spleen: Enlarged measuring 14.4 cm. Pancreas: Unremarkable. Gallbladder: No calcified stones. Biliary: Nondilated. Adrenal glands: Normal. Kidneys: Small nonobstructing LEFT kidney stone.  RIGHT kidney shows mildly prominent collecting system and proximal ureter without calcified stone demonstrated.. Bowel: No bowel obstruction or focal bowel wall thickening. Lymph nodes: Multiple mildly enlarged  debora hepatis and  portacaval lymph nodes measuring up to 16 mm short axis.  Mildly prominent pericaval lymph nodes. Vasculature: Unremarkable. Peritoneum: Unremarkable without ascites. Musculoskeletal: Degenerative disc disease at L5-S1 with grade 2 anterolisthesis.  Probable L5 pars defects.  LEFT hip prosthesis. Pelvis: Bladder is unremarkable.  No dependent fluid.     1.  Large irregular heterogeneous low-attenuation lesion in the RIGHT lobe liver superiorly measuring 5.6 cm, most suggestive of abscess. 2.  Prominent pericaval, portacaval and debora hepatis lymph nodes, inflammatory versus neoplastic. 3.  Mild RIGHT hydronephrosis without obstructing ureteral stone demonstrated. 4.  Small nonobstructing LEFT kidney stone.    DX-CHEST-PORTABLE (1 VIEW)    Result Date: 12/28/2024 12/28/2024 7:57 PM HISTORY/REASON FOR EXAM:  Sepsis; sepsis. Fever. TECHNIQUE/EXAM DESCRIPTION AND NUMBER OF VIEWS: Single portable view of the chest. COMPARISON: None FINDINGS: Cardiomediastinal contour is within normal limits. No focal pulmonary consolidation. Probable nipple shadow of the RIGHT lung base. No pleural fluid collection or pneumothorax. No major bony abnormality is seen.     1.  No acute cardiopulmonary disease. 2.  Probable RIGHT lung base Nipple shadows.  Consider confirmation with nonemergent followup chest x-ray with nipple markers and shallow oblique views.       Micro:  Results       Procedure Component Value Units Date/Time    BLOOD CULTURE [[id number]] Collected: 01/01/25 1612    Order Status: Completed Specimen: Blood from Peripheral Updated: 01/01/25 2008     Significant Indicator NEG     Source BLD     Site Peripheral     Culture Result No Growth  Note: Blood cultures are incubated for 5 days and  are monitored continuously.Positive blood cultures  are called to the RN and reported as soon as  they are identified.      BLOOD CULTURE [[id number]] Collected: 01/01/25 1612    Order Status: Completed Specimen: Blood from Peripheral  Updated: 01/01/25 2008     Significant Indicator NEG     Source BLD     Site Peripheral     Culture Result No Growth  Note: Blood cultures are incubated for 5 days and  are monitored continuously.Positive blood cultures  are called to the RN and reported as soon as  they are identified.      Blood Culture - Draw one from central line and one from peripheral site [[id number]]  (Abnormal) Collected: 12/28/24 1942    Order Status: Completed Specimen: Blood from Line Updated: 01/01/25 0719     Significant Indicator POS     Source BLD     Site Peripheral     Culture Result Growth detected by automated blood culture system.  12/29/2024  22:49        Streptococcus intermedius  See previous culture for sensitivity report.      E-Test [[id number]] Collected: 12/28/24 1942    Order Status: Completed Specimen: Other Updated: 01/01/25 0719     ETEST Sensitivity FINAL    Narrative:      61 tel. [id number] 12/29/2024, 18:25, RB PERF. RESULTS CALLED TO:[id number]    Blood Culture - Draw one from central line and one from peripheral site [[id number]]  (Abnormal)  (Susceptibility) Collected: 12/28/24 1942    Order Status: Completed Specimen: Blood from Peripheral Updated: 01/01/25 0719     Significant Indicator POS     Source BLD     Site PERIPHERAL     Culture Result Growth detected by automated blood culture system. 12/29/2024  18:22        Streptococcus intermedius    Susceptibility       Streptococcus intermedius (1)       Antibiotic Interpretation Microscan   Method Status    Cefotaxime Sensitive 0.094 mcg/mL E-TEST Final                       CoV-2, Flu A/B, And RSV by PCR (CepStrikeAdid) [[id number]] Collected: 12/31/24 1616    Order Status: Completed Specimen: Respirate from Nasopharyngeal Updated: 12/31/24 1718     Influenza virus A RNA Negative     Influenza virus B, PCR Negative     RSV, PCR Negative     SARS-CoV-2 by PCR NotDetected     Comment: [facility] providers: PLEASE REFER TO DE-ESCALATION AND RETESTING PROTOCOL  on  [email or web address]    **The [employer] GeneXpert Xpress SARS-CoV-2 RT-PCR Test has been made  available for use under the Emergency Use Authorization (EUA) only.          SARS-CoV-2 Source NP Swab    CULTURE WOUND W/ GRAM STAIN [[id number]]  (Abnormal) Collected: 12/29/24 1215    Order Status: Completed Specimen: Wound Updated: 12/31/24 0942     Significant Indicator POS     Source WND     Site Liver abscess     Culture Result -     Gram Stain Result Many WBCs.  Many Gram positive cocci in chains.       Culture Result Streptococcus intermedius  Heavy growth      Urine Culture (New) [[id number]] Collected: 12/28/24 2011    Order Status: Completed Specimen: Urine Updated: 12/30/24 0714     Significant Indicator NEG     Source UR     Site -     Culture Result No growth at 48 hours.    GRAM STAIN [[id number]] Collected: 12/29/24 1215    Order Status: Completed Specimen: Wound Updated: 12/29/24 1812     Significant Indicator .     Source WND     Site Liver abscess     Gram Stain Result Many WBCs.  Many Gram positive cocci in chains.      FLUID CULTURE W/GRAM STAIN [[id number]]     Order Status: Canceled Specimen: Other Body Fluid     Urinalysis [[id number]] Collected: 12/28/24 2011    Order Status: Completed Specimen: Urine Updated: 12/28/24 2023     Color Yellow     Character Clear     Specific Gravity 1.008     Ph 5.5     Glucose Negative mg/dL      Ketones Negative mg/dL      Protein Negative mg/dL      Bilirubin Negative     Urobilinogen, Urine 1.0 EU/dL      Nitrite Negative     Leukocyte Esterase Negative     Occult Blood Negative     Micro Urine Req see below     Comment: Microscopic examination not performed when specimen is clear  and chemically negative for protein, blood, leukocyte esterase  and nitrite.                 Assessment:  Active Hospital Problems    Diagnosis     *Liver abscess [K75.0]     ACP (advance care planning) [Z71.89]     Hydronephrosis of right kidney [N13.30]     Elevated LFTs [R79.89]      Dyslipidemia [E78.5]     Non-Hodgkin's lymphoma (HCC) [C85.90]    Liver abscess  -Initial CT Heterogeneous irregular low-attenuation structure in the superior RIGHT lobe liver measuring 5.1 x 5.7 x 4.5 cm  -s/p IR drain placement  -CT 1/2 Reidentified is an area of ill-defined low-attenuation in the liver centrally. This measures 7.2 x 6.5 x 5.7 cm. There is a pigtail drainage catheter in this region on the current exam along with some new gas. Liver is enlarged measuring 19.1 cm   Strep bacteremia  -blood cult +12/28  Leukocytosis  NHL-given h/o NHL consider biopsy lymph node  -+HSM     PLAN:   BCxs + strep intermedius 12/28  Repeat Bcxs 1/1 negative to date  TTE recommended; CASANDRA if persistently positive blood cultures  Repeat CT scan 1/2- increased size abscess-not mentioned in impression  Management drain per IR-plan for new drain today  Continue Unasyn  Wound care  Final antibiotic recommendations per culture results and clinical course    Midline yes once blood cultures negative 48 hours  Dispo TBD

## 2025-01-03 NOTE — PROGRESS NOTES
Pt presents to IR1. Pt was consented by MD at bedside, confirmed by this RN and consent at bedside. Pt transferred to IR table in supine position. Patient underwent a liver abscess drain exchange by Dr. Neumann. This was a NO sedation procedure. Local only. Procedure site was marked by MD and verified using imaging guidance. Pt placed on monitor, prepped and draped in a sterile fashion. Vitals were taken every 5 minutes and remained stable during procedure (see doc flow sheet for results).  Report called to primary RN. Pt transported by stretcher with RN to [id number].       [place]  Flexima APDL  Drainage Catheter System  12F x 25cm  REF: [id number]  LOT: [id number]  EXP: 11/04/2027  Exchanged on 1/3/25 @ 1400

## 2025-01-03 NOTE — CARE PLAN
The patient is Stable - Low risk of patient condition declining or worsening    Shift Goals  Clinical Goals: IR procedure, monitor DANISHA drain  Patient Goals: rest and comfort  Family Goals: none present    Progress made toward(s) clinical / shift goals:  Patient completed IR procedure tolerated well, back to unit, regular diet reinstated, tolerating well with no gastrointestinal irritability. No complaints of pain or discomfort at this time. Resting comfortably, call light is within reach,  at bedside.    Patient is not progressing towards the following goals:

## 2025-01-03 NOTE — OR SURGEON
Immediate Post- Operative Note        Findings: Resolving Hepatic Abscess      Procedure(s): Abscessogram and Locking Loop Drain EX      Estimated Blood Loss: Less than 5 ml        Complications: None            1/3/2025     2:09 PM     Edmundo Neumann M.D.

## 2025-01-03 NOTE — PROGRESS NOTES
[facility] Medicine Daily Progress Note    Date of Service  1/3/2025    Chief Complaint  Jet Webb is a 69 y.o. male admitted 12/28/2024 with fever and chills.    Hospital Course  Jet Webb is a 69-year-old male with PMHx non-Hodgkin's lymphoma, nephrolithiasis, HLD.  Admitted 12/28 for right sided liver abscesses.    Initially, patient presenting with fever, chills. CT of the abdomen pelvis with contrast: Right liver lobe lesion 5.6 cm most suggestive of abscess. Prominent pericaval, portacaval and debora hepatis lymph nodes. Mild right hydronephrosis without obstructing ureteral stones demonstrated.     Patient was started on IV Zosyn.  IR was consulted.  Patient underwent CT-guided drainage on 12/29.    Interval Problem Update  12/30: Vitals notable for Tmax 100.7.  Intermittent tachycardia.  SBP ranging 106 through 159.  DANISHA drain placed yesterday.  Wound cultures pending.    12/31: Patient remains afebrile.   through 144.  Resting comfortably on room air.  WBC 16.5 from 20.8.  Hb stable at 11.1.  LFTs downtrending.  Wound cultures positive for strep intermedius.  Discontinue IV Zosyn.  Start IV Unasyn.  Plan to repeat CT A/P on 1/3. Discussed with JUAN Quintana with IR at bedside. Plan to continue IV antibiotics and repeat CT A/P on 1/3.     1/1: Tmax 100.8 overnight.  COVID, flu, RSV negative.  WBC 17 from 16.5.  Hb stable at 11.8.  LFTs downtrending.  Repeat blood cultures ordered for today.  Planning for CT scan 1/3.    1/2: Tmax 101.4 overnight.  WBC 18.6 from 17.  Hb stable at 11.3.  Potassium 3.5; replaced.  LFTs continue to downtrend.  Repeat CT abdomen showing improvement in abscesses with drain in appropriate placement.  No plans for adjustment this time.  Continue IV Unasyn today.  Follow repeat blood cultures.  Repeat CBC in a.m.    1/3: Afebrile overnight.  WBC 20.4 from 18.6.  Discussed with Kamryn Sung, ABDIRAHMAN and Dr. Russo, infectious disease.  N.p.o. for  percutaneous drain exchange today with IR.    I have discussed this patient's plan of care and discharge plan at IDT rounds today with Case Management, Nursing, Nursing leadership, and other members of the IDT team.    Consultants/Specialty  infectious disease and IR    Code Status  Full Code    Disposition  The patient is not medically cleared for discharge to home or a post-acute facility.      I have placed the appropriate orders for post-discharge needs.    Review of Systems  Review of Systems   Constitutional:  Negative for fever and malaise/fatigue.   Respiratory:  Negative for shortness of breath.    Cardiovascular:  Negative for chest pain and leg swelling.   Gastrointestinal:  Positive for abdominal pain. Negative for nausea and vomiting.        Physical Exam  Temp:  [36.4 °C (97.6 °F)-37.1 °C (98.7 °F)] 36.7 °C (98 °F)  Pulse:  [66-83] 66  Resp:  [18] 18  BP: (126-134)/(71-76) 133/74  SpO2:  [93 %-95 %] 95 %    Physical Exam  Vitals and nursing note reviewed.   Constitutional:       General: He is not in acute distress.     Appearance: Normal appearance. He is not ill-appearing.   Cardiovascular:      Rate and Rhythm: Normal rate and regular rhythm.   Pulmonary:      Effort: Pulmonary effort is normal.      Breath sounds: Normal breath sounds.   Abdominal:      General: Bowel sounds are normal. There is no distension.      Palpations: Abdomen is soft.      Tenderness: There is abdominal tenderness.      Comments: DANISHA drain to epigastric region.  Serosanguineous drainage present   Skin:     General: Skin is warm and dry.   Neurological:      Mental Status: He is alert and oriented to person, place, and time. Mental status is at baseline.   Psychiatric:         Mood and Affect: Mood normal.         Behavior: Behavior normal.         Fluids    Intake/Output Summary (Last 24 hours) at 1/3/2025 1346  Last data filed at 1/3/2025 0553  Gross per 24 hour   Intake 320 ml   Output 707 ml   Net -387 ml         Laboratory  Recent Labs     01/01/25 0319 01/02/25  0035 01/03/25  0031   WBC 17.0* 18.6* 20.4*   RBC 3.78* 3.68* 3.65*   HEMOGLOBIN 11.8* 11.3* 11.0*   HEMATOCRIT 35.8* 34.0* 34.4*   MCV 94.7 92.4 94.2   MCH 31.2 30.7 30.1   MCHC 33.0 33.2 32.0*   RDW 42.9 42.4 42.8   PLATELETCT 260 291 347   MPV 9.1 8.9* 8.8*     Recent Labs     01/01/25 0319 01/02/25  0035 01/03/25  0031   SODIUM 136 138 140   POTASSIUM 3.9 3.5* 3.6   CHLORIDE 102 104 104   CO2 24 23 27   GLUCOSE 120* 116* 106*   BUN 15 14 13   CREATININE 0.78 0.73 0.66   CALCIUM 8.5 8.6 8.4*                     Imaging  CT-ABDOMEN WITH   Final Result         1. There are new small effusions and compressive atelectasis, right worse than left.   2. There has been interval percutaneous drainage of the hepatic abscess.   3. Small left renal stone.   4. Mild hepatosplenomegaly.   5. Diverticulosis.   6. 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               CT-DRAIN-LIVER ABSCESS-CYST   Final Result      1.  CT guided placement of a percutaneous drainage catheter in a right hepatic abscess.   2.  The current plan is to obtain a follow-up CT in 5-7 days..      CT-ABDOMEN-PELVIS WITH   Final Result   Addendum (preliminary) 1 of 1   These findings were discussed with JAYDON BEYER on 12/28/2024    9:12 PM.      Final      1.  Large irregular heterogeneous low-attenuation lesion in the RIGHT lobe liver superiorly measuring 5.6 cm, most suggestive of abscess.   2.  Prominent pericaval, portacaval and debora hepatis lymph nodes, inflammatory versus neoplastic.   3.  Mild RIGHT hydronephrosis without obstructing ureteral stone demonstrated.   4.  Small nonobstructing LEFT kidney stone.      DX-CHEST-PORTABLE (1 VIEW)   Final Result      1.  No acute cardiopulmonary disease.   2.  Probable RIGHT lung base Nipple shadows.  Consider confirmation with nonemergent followup chest x-ray with nipple markers and shallow oblique views.          IR-DRAINAGE-CATH EXCHANGE    (Results Pending)        Assessment/Plan  * Liver abscess- (present on admission)  Assessment & Plan  CT of the abdomen pelvis with contrast: Right liver lobe lesion 5.6 cm most suggestive of abscess.  S/p drain placement 12/29  Cultures growing strep intermedius  Repeat CT abdomen 1/2: Reidentified is an area of ill-defined low-attenuation in the liver centrally. This measures 7.2 x 6.5 x 5.7 cm. There is a pigtail drainage catheter in this region on the current exam along with some new gas.  - N.p.o. for drain exchange today with IR  - Continue IV Unasyn  - ID following  - Repeat CMP and CBC in a.m.  - Continue drain management per IR recommendations    Bacteremia  Assessment & Plan  Blood cultures positive for strep intermedius 12/28  Repeat blood cultures 1/1 NGTD  - Repeat blood cultures every 48 hours until negative  - Infectious diseases following  - Continue IV Unasyn    ACP (advance care planning)  Assessment & Plan  Patient admitted with liver abscess.  History of non-Hodgkin lymphoma.  Age of 69.  I discussed the CODE STATUS with him and his  at bedside, including CPR, intubation/mechanical ventilation.  He wants to stay full code if there is no terminal diagnosis or mental inability.  Continue full code.  ACP 16 minutes.  Above per previous hospitalist     Elevated LFTs  Assessment & Plan  LFTs improving  Presumed secondary to liver abscess  Hepatitis panel negative  - Repeat CMP in a.m.    Hydronephrosis of right kidney  Assessment & Plan  Incidental finding on CT of the abdomen with contrast: Mild right hydronephrosis without obstructing ureteral stone.    Dyslipidemia- (present on admission)  Assessment & Plan  Will hold Lipitor due to elevated LFTs    Non-Hodgkin's lymphoma (HCC)- (present on admission)  Assessment & Plan  In remission for 4-5 years   per oncology note is low-grade, not on active treatments.    Continue  surveillance per Dr. Chen         VTE  prophylaxis:   SCDs/TEDs   enoxaparin ppx      I have performed a physical exam and reviewed and updated ROS and Plan today (1/3/2025). In review of yesterday's note (1/2/2025), there are no changes except as documented above.

## 2025-01-03 NOTE — DISCHARGE PLANNING
"HTH/SCP TCN chart review completed. Current discharge considerations are home with home health and home infusion if needed.  Discharge considerations highly dependent on ID recommendations. Noted per ID note on1/2, disposition remains \"TBD\" and still awaiting cultures for final recommendation.  In addition, per ID note, \"Management drain per IR-may need upsize vs surgical intervention.\" Per kardex patient amublated 2x220 feet with no AD or assist and is on RA.     TCN will continue to follow and collaborate with discharge planning team as additional post acute needs arise. Thank you.    Completed:  Choice obtained: HH (1. [facility] , 2. [facility] ) & Infusion ([facility]).   Pt aware of [facility] blanket referral policy  SCP with [facility] PCP. Patient has PCP f/u scheduled for 1/6  "

## 2025-01-03 NOTE — CARE PLAN
The patient is Stable - Low risk of patient condition declining or worsening    Shift Goals  Clinical Goals: onitor DANISHA drain output, drain care  Patient Goals: sleep  Family Goals: none present    Progress made toward(s) clinical / shift goals:    Problem: Pain - Standard  Goal: Alleviation of pain or a reduction in pain to the patient’s comfort goal  Outcome: Progressing     Problem: Fall Risk  Goal: Patient will remain free from falls  Outcome: Progressing   Patient is alert and oriented. Drain care done. Call light within reach.     Patient is not progressing towards the following goals:

## 2025-01-04 LAB
ALBUMIN SERPL BCP-MCNC: 3 G/DL (ref 3.2–4.9)
ALBUMIN/GLOB SERPL: 1.1 G/DL
ALP SERPL-CCNC: 135 U/L (ref 30–99)
ALT SERPL-CCNC: 45 U/L (ref 2–50)
ANION GAP SERPL CALC-SCNC: 9 MMOL/L (ref 7–16)
AST SERPL-CCNC: 22 U/L (ref 12–45)
BILIRUB SERPL-MCNC: 0.3 MG/DL (ref 0.1–1.5)
BUN SERPL-MCNC: 18 MG/DL (ref 8–22)
CALCIUM ALBUM COR SERPL-MCNC: 9 MG/DL (ref 8.5–10.5)
CALCIUM SERPL-MCNC: 8.2 MG/DL (ref 8.5–10.5)
CHLORIDE SERPL-SCNC: 103 MMOL/L (ref 96–112)
CO2 SERPL-SCNC: 26 MMOL/L (ref 20–33)
CREAT SERPL-MCNC: 0.62 MG/DL (ref 0.5–1.4)
ERYTHROCYTE [DISTWIDTH] IN BLOOD BY AUTOMATED COUNT: 42.5 FL (ref 35.9–50)
GFR SERPLBLD CREATININE-BSD FMLA CKD-EPI: 102 ML/MIN/1.73 M 2
GLOBULIN SER CALC-MCNC: 2.7 G/DL (ref 1.9–3.5)
GLUCOSE SERPL-MCNC: 113 MG/DL (ref 65–99)
HCT VFR BLD AUTO: 33.7 % (ref 42–52)
HGB BLD-MCNC: 10.8 G/DL (ref 14–18)
MCH RBC QN AUTO: 30 PG (ref 27–33)
MCHC RBC AUTO-ENTMCNC: 32 G/DL (ref 32.3–36.5)
MCV RBC AUTO: 93.6 FL (ref 81.4–97.8)
PLATELET # BLD AUTO: 398 K/UL (ref 164–446)
PMV BLD AUTO: 8.6 FL (ref 9–12.9)
POTASSIUM SERPL-SCNC: 3.8 MMOL/L (ref 3.6–5.5)
PROT SERPL-MCNC: 5.7 G/DL (ref 6–8.2)
RBC # BLD AUTO: 3.6 M/UL (ref 4.7–6.1)
SODIUM SERPL-SCNC: 138 MMOL/L (ref 135–145)
WBC # BLD AUTO: 16.7 K/UL (ref 4.8–10.8)

## 2025-01-04 PROCEDURE — A9270 NON-COVERED ITEM OR SERVICE: HCPCS | Performed by: INTERNAL MEDICINE

## 2025-01-04 PROCEDURE — 36415 COLL VENOUS BLD VENIPUNCTURE: CPT

## 2025-01-04 PROCEDURE — 700111 HCHG RX REV CODE 636 W/ 250 OVERRIDE (IP): Mod: JZ | Performed by: INTERNAL MEDICINE

## 2025-01-04 PROCEDURE — 700105 HCHG RX REV CODE 258: Performed by: INTERNAL MEDICINE

## 2025-01-04 PROCEDURE — 770006 HCHG ROOM/CARE - MED/SURG/GYN SEMI*

## 2025-01-04 PROCEDURE — 99232 SBSQ HOSP IP/OBS MODERATE 35: CPT | Performed by: STUDENT IN AN ORGANIZED HEALTH CARE EDUCATION/TRAINING PROGRAM

## 2025-01-04 PROCEDURE — 306623 RESERVOIR,SUCTION 100 CC: Performed by: STUDENT IN AN ORGANIZED HEALTH CARE EDUCATION/TRAINING PROGRAM

## 2025-01-04 PROCEDURE — 85027 COMPLETE CBC AUTOMATED: CPT

## 2025-01-04 PROCEDURE — 700101 HCHG RX REV CODE 250: Performed by: NURSE PRACTITIONER

## 2025-01-04 PROCEDURE — 700102 HCHG RX REV CODE 250 W/ 637 OVERRIDE(OP): Performed by: INTERNAL MEDICINE

## 2025-01-04 PROCEDURE — 80053 COMPREHEN METABOLIC PANEL: CPT

## 2025-01-04 RX ADMIN — ENOXAPARIN SODIUM 40 MG: 100 INJECTION SUBCUTANEOUS at 16:28

## 2025-01-04 RX ADMIN — AMPICILLIN AND SULBACTAM 3 G: 1; 2 INJECTION, POWDER, FOR SOLUTION INTRAMUSCULAR; INTRAVENOUS at 04:47

## 2025-01-04 RX ADMIN — AMPICILLIN AND SULBACTAM 3 G: 1; 2 INJECTION, POWDER, FOR SOLUTION INTRAMUSCULAR; INTRAVENOUS at 12:15

## 2025-01-04 RX ADMIN — AMPICILLIN AND SULBACTAM 3 G: 1; 2 INJECTION, POWDER, FOR SOLUTION INTRAMUSCULAR; INTRAVENOUS at 00:20

## 2025-01-04 RX ADMIN — ACYCLOVIR 800 MG: 800 TABLET ORAL at 09:56

## 2025-01-04 RX ADMIN — SODIUM CHLORIDE, PRESERVATIVE FREE 10 ML: 5 INJECTION INTRAVENOUS at 16:37

## 2025-01-04 RX ADMIN — AMPICILLIN AND SULBACTAM 3 G: 1; 2 INJECTION, POWDER, FOR SOLUTION INTRAMUSCULAR; INTRAVENOUS at 23:54

## 2025-01-04 RX ADMIN — SODIUM CHLORIDE, PRESERVATIVE FREE 10 ML: 5 INJECTION INTRAVENOUS at 04:47

## 2025-01-04 RX ADMIN — AMPICILLIN AND SULBACTAM 3 G: 1; 2 INJECTION, POWDER, FOR SOLUTION INTRAMUSCULAR; INTRAVENOUS at 16:36

## 2025-01-04 RX ADMIN — MONTELUKAST 10 MG: 10 TABLET, FILM COATED ORAL at 16:28

## 2025-01-04 ASSESSMENT — ENCOUNTER SYMPTOMS
FEVER: 0
PALPITATIONS: 0
SHORTNESS OF BREATH: 0
ABDOMINAL PAIN: 1
WEAKNESS: 0
VOMITING: 0
NAUSEA: 0
CHILLS: 0

## 2025-01-04 ASSESSMENT — PAIN DESCRIPTION - PAIN TYPE
TYPE: ACUTE PAIN
TYPE: ACUTE PAIN

## 2025-01-04 NOTE — PROGRESS NOTES
[facility] Medicine Daily Progress Note    Date of Service  1/4/2025    Chief Complaint  Jet Webb is a 69 y.o. male admitted 12/28/2024 with fever and chills.    Hospital Course  Jet Webb is a 69-year-old male with PMHx non-Hodgkin's lymphoma, nephrolithiasis, HLD.  Admitted 12/28 for right sided liver abscesses.    Initially, patient presenting with fever, chills. CT of the abdomen pelvis with contrast: Right liver lobe lesion 5.6 cm most suggestive of abscess. Prominent pericaval, portacaval and debora hepatis lymph nodes. Mild right hydronephrosis without obstructing ureteral stones demonstrated.     Patient was started on IV Zosyn.  IR was consulted.  Patient underwent CT-guided drainage on 12/29.    Interval Problem Update  12/30: Vitals notable for Tmax 100.7.  Intermittent tachycardia.  SBP ranging 106 through 159.  DANISHA drain placed yesterday.  Wound cultures pending.    12/31: Patient remains afebrile.   through 144.  Resting comfortably on room air.  WBC 16.5 from 20.8.  Hb stable at 11.1.  LFTs downtrending.  Wound cultures positive for strep intermedius.  Discontinue IV Zosyn.  Start IV Unasyn.  Plan to repeat CT A/P on 1/3. Discussed with JUAN Quintana with IR at bedside. Plan to continue IV antibiotics and repeat CT A/P on 1/3.     1/1: Tmax 100.8 overnight.  COVID, flu, RSV negative.  WBC 17 from 16.5.  Hb stable at 11.8.  LFTs downtrending.  Repeat blood cultures ordered for today.  Planning for CT scan 1/3.    1/2: Tmax 101.4 overnight.  WBC 18.6 from 17.  Hb stable at 11.3.  Potassium 3.5; replaced.  LFTs continue to downtrend.  Repeat CT abdomen showing improvement in abscesses with drain in appropriate placement.  No plans for adjustment this time.  Continue IV Unasyn today.  Follow repeat blood cultures.  Repeat CBC in a.m.    1/3: Afebrile overnight.  WBC 20.4 from 18.6.  Discussed with Kamryn Sung, ABDIRAHMAN and Dr. Russo, infectious disease.  N.p.o. for  percutaneous drain exchange today with IR.    1/4: Afebrile overnight.  SBP ranging 118-142.  WBC 16.7 from 20.4.  Echocardiogram is pending to ensure no vegetations.  Blood cultures remain negative.  Continue IV Unasyn today.  Anticipate discharge early next week with IV antibiotics and close outpatient follow-up.    I have discussed this patient's plan of care and discharge plan at IDT rounds today with Case Management, Nursing, Nursing leadership, and other members of the IDT team.    Consultants/Specialty  infectious disease and IR    Code Status  Full Code    Disposition  Medically Cleared  I have placed the appropriate orders for post-discharge needs.    Review of Systems  Review of Systems   Constitutional:  Negative for fever and malaise/fatigue.   Respiratory:  Negative for shortness of breath.    Cardiovascular:  Negative for chest pain and leg swelling.   Gastrointestinal:  Positive for abdominal pain. Negative for nausea and vomiting.        Physical Exam  Temp:  [36.6 °C (97.8 °F)-36.9 °C (98.4 °F)] 36.6 °C (97.8 °F)  Pulse:  [67-79] 67  Resp:  [17-25] 17  BP: (118-142)/(71-76) 138/76  SpO2:  [91 %-97 %] 95 %    Physical Exam  Vitals and nursing note reviewed.   Constitutional:       General: He is not in acute distress.     Appearance: Normal appearance. He is not ill-appearing.   Cardiovascular:      Rate and Rhythm: Normal rate and regular rhythm.   Pulmonary:      Effort: Pulmonary effort is normal.      Breath sounds: Normal breath sounds.   Abdominal:      General: Bowel sounds are normal. There is no distension.      Palpations: Abdomen is soft.      Tenderness: There is abdominal tenderness.      Comments: DANISHA drain to epigastric region.  Serosanguineous drainage present   Skin:     General: Skin is warm and dry.   Neurological:      Mental Status: He is alert and oriented to person, place, and time. Mental status is at baseline.   Psychiatric:         Mood and Affect: Mood normal.          Behavior: Behavior normal.         Fluids    Intake/Output Summary (Last 24 hours) at 1/4/2025 1307  Last data filed at 1/4/2025 0954  Gross per 24 hour   Intake 360 ml   Output 701.5 ml   Net -341.5 ml        Laboratory  Recent Labs     01/02/25  0035 01/03/25  0031 01/04/25  0032   WBC 18.6* 20.4* 16.7*   RBC 3.68* 3.65* 3.60*   HEMOGLOBIN 11.3* 11.0* 10.8*   HEMATOCRIT 34.0* 34.4* 33.7*   MCV 92.4 94.2 93.6   MCH 30.7 30.1 30.0   MCHC 33.2 32.0* 32.0*   RDW 42.4 42.8 42.5   PLATELETCT 291 347 398   MPV 8.9* 8.8* 8.6*     Recent Labs     01/02/25 0035 01/03/25  0031 01/04/25  0032   SODIUM 138 140 138   POTASSIUM 3.5* 3.6 3.8   CHLORIDE 104 104 103   CO2 23 27 26   GLUCOSE 116* 106* 113*   BUN 14 13 18   CREATININE 0.73 0.66 0.62   CALCIUM 8.6 8.4* 8.2*                     Imaging  IR-DRAINAGE-CATH EXCHANGE   Final Result      1.  Resolving right hepatic abscess.      2.  Successful locking loop catheter exchange.      CT-ABDOMEN WITH   Final Result         1. There are new small effusions and compressive atelectasis, right worse than left.   2. There has been interval percutaneous drainage of the hepatic abscess.   3. Small left renal stone.   4. Mild hepatosplenomegaly.   5. Diverticulosis.   6. 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               CT-DRAIN-LIVER ABSCESS-CYST   Final Result      1.  CT guided placement of a percutaneous drainage catheter in a right hepatic abscess.   2.  The current plan is to obtain a follow-up CT in 5-7 days..      CT-ABDOMEN-PELVIS WITH   Final Result   Addendum (preliminary) 1 of 1   These findings were discussed with JAYDON BEYER on 12/28/2024    9:12 PM.      Final      1.  Large irregular heterogeneous low-attenuation lesion in the RIGHT lobe liver superiorly measuring 5.6 cm, most suggestive of abscess.   2.  Prominent pericaval, portacaval and debora hepatis lymph nodes, inflammatory versus neoplastic.   3.  Mild RIGHT hydronephrosis without  obstructing ureteral stone demonstrated.   4.  Small nonobstructing LEFT kidney stone.      DX-CHEST-PORTABLE (1 VIEW)   Final Result      1.  No acute cardiopulmonary disease.   2.  Probable RIGHT lung base Nipple shadows.  Consider confirmation with nonemergent followup chest x-ray with nipple markers and shallow oblique views.         EC-ECHOCARDIOGRAM COMPLETE W/O CONT    (Results Pending)        Assessment/Plan  * Liver abscess- (present on admission)  Assessment & Plan  CT of the abdomen pelvis with contrast: Right liver lobe lesion 5.6 cm most suggestive of abscess.  S/p drain placement 12/29  Cultures growing strep intermedius  Repeat CT abdomen 1/2: Reidentified is an area of ill-defined low-attenuation in the liver centrally. This measures 7.2 x 6.5 x 5.7 cm. There is a pigtail drainage catheter in this region on the current exam along with some new gas.  IR DANISHA drain exchange 1/3; WBC improved to 16 after exchange  - Continue IV Unasyn  - ID following  - Repeat CMP and CBC in a.m.  - Continue drain management per IR recommendations    Bacteremia  Assessment & Plan  Blood cultures positive for strep intermedius 12/28  Repeat blood cultures 1/1 NGTD  - Repeat blood cultures every 48 hours until negative  - Infectious diseases following  - Continue IV Unasyn    ACP (advance care planning)  Assessment & Plan  Patient admitted with liver abscess.  History of non-Hodgkin lymphoma.  Age of 69.  I discussed the CODE STATUS with him and his  at bedside, including CPR, intubation/mechanical ventilation.  He wants to stay full code if there is no terminal diagnosis or mental inability.  Continue full code.  ACP 16 minutes.  Above per previous hospitalist     Elevated LFTs  Assessment & Plan  LFTs improving  Presumed secondary to liver abscess  Hepatitis panel negative  - Repeat CMP in a.m.    Hydronephrosis of right kidney  Assessment & Plan  Incidental finding on CT of the abdomen with contrast: Mild right  hydronephrosis without obstructing ureteral stone.    Dyslipidemia- (present on admission)  Assessment & Plan  Will hold Lipitor due to elevated LFTs    Non-Hodgkin's lymphoma (HCC)- (present on admission)  Assessment & Plan  In remission for 4-5 years   per oncology note is low-grade, not on active treatments.    Continue  surveillance per Dr. Chen         VTE prophylaxis:    enoxaparin ppx      I have performed a physical exam and reviewed and updated ROS and Plan today (1/4/2025). In review of yesterday's note (1/3/2025), there are no changes except as documented above.

## 2025-01-04 NOTE — PROGRESS NOTES
Radiology Progress Note   Author: HEATHER Osorio Date & Time created: 1/4/2025  8:07 AM   Date of admission  12/28/2024  Note to reader: this note follows the APSO format rather than the historical SOAP format. Assessment and plan located at the top of the note for ease of use.    Chief Complaint  69 y.o. male admitted 12/28/2024 with   Chief Complaint   Patient presents with    Sent from Urgent Care      wbc 24, fever, chills. Called patient who requested antibiotics however at this time there is no clear source of infection. He has a history of non-Hodgkin's lymphoma and reports new lymphadenopathy. Further work up is needed as fevers ar on going with no known source of infection. Advised patient to go to  ED further evaluation; he verbalized understanding and intention to go to ED.    Fever     X 3 days now. Pt states taking advil for fever however it comes right back.     Urinary Frequency     Pt endorses urinary urgency  has gotten much worse the last 3/4 days          HPI  69-year-old male with past medical history significant for non-Hodgkin's lymphoma, nephrolithiasis, HLD admitted 12/28/2024 for CT finding of right hepatic fluid collection concerning for abscess after presenting to the ER with fevers and chills.  IR was consulted and patient underwent CT-guided liver abscess drain placement with IR Dr. Chandler on 12/29/2024    Interval History:   12/30/2024 -hepatic abscess 12F drain to RUQ with 140 mL slightly turbid serosanguinous output in the last 24 hours. Labs reviewed; WBC 20.8, H&H 11.5/33.9, , , alk phos 126, creatinine 0.84.  Hepatic fluid cultures pending. Drain flushed with 10 mL NS. I reviewed IDT notes and images.    12/31/24 - Hepatic abscess 12F drain to RUQ with 68 mL seropurulent output in the last 24 hours. Labs reviewed; WBC 16.5, H&H 11.1/33.0, AST 52, , alk phos 125, creatinine 0.73.  Hepatic fluid cultures preliminarily positive for streptococcus  intermedius. Drain flushed with 10 mL NS. I reviewed IDT notes and images and discussed patient with Dr. Griffin.     01/01/25 - Hepatic abscess 12F drain to RUQ with 50 mL serosanguinous output in the last 24 hours. Labs reviewed; WBC 17.0, H&H 11.8/35.8, AST 48, ALT 91, alk phos 149, creatinine 0.78.  Hepatic fluid cultures positive for streptococcus intermedius, on abx through ID. Drain flushed with 20 mL NS with 10 mLs able to be aspirated back. I reviewed IDT notes.     01/02/25 - Hepatic abscess 12F drain to RUQ with 27.5 mL serosanguinous output in the last 24 hours. Labs reviewed; WBC 18.6, H&H 11.3/34.0, AST 45, ALT 76, alk phos 161, creatinine 0.73.  Drain flushed with 20 mL NS with 10 mLs able to be aspirated back. I reviewed IDT notes and discussed POC with Dr Griffin.     01/03/25 - Hepatic abscess 12F drain to RUQ with 10 mL serosanguinous output in the last 24 hours. Labs reviewed; WBC 20.4, H&H 11.0/34.4, AST 23, ALT 54, alk phos 139, creatinine 0.66.  I reviewed IDT notes and discussed POC with Dr Griffin.     01/04/25 - Hepatic abscess 12F drain to RUQ with 1.5 mL serosanguinous output in the last 24 hours. Labs reviewed; WBC 16.7, H&H 10.8/33.7, AST 22, ALT 45, alk phos 135, creatinine 0.62.  I reviewed IDT notes, imaging from recent procedure, and discussed POC with Dr Griffin.     Assessment/Plan     Principal Problem:    Liver abscess  Active Problems:    Non-Hodgkin's lymphoma (HCC)    Dyslipidemia    Hydronephrosis of right kidney    Elevated LFTs    ACP (advance care planning)    Bacteremia      Plan IR  - Continue order to irrigate RUQ drain with 10 ml of sterile saline each shift  - Fluid cultures positive for streptococcus intermedius  - ID following   - CT 01/02/25 shows slightly improved hepatic abscess with drain in correct place.   - Hepatic drain exchanged 01/03/25   - Continue to monitor drains, VS, and labs    -  -Thank you for allowing Interventional Radiology team to  participate in the patients care, if any additional care or requests are needed in the future please do not hesitate to call or place IR order           Review of Systems  Physical Exam   Review of Systems   Constitutional:  Negative for chills and fever.   Respiratory:  Negative for shortness of breath.    Cardiovascular:  Negative for chest pain and palpitations.   Gastrointestinal:  Positive for abdominal pain. Negative for nausea and vomiting.   Neurological:  Negative for weakness.      Vitals:    01/04/25 0756   BP: 138/76   Pulse: 67   Resp: 17   Temp: 36.6 °C (97.8 °F)   SpO2: 95%        Physical Exam  Cardiovascular:      Rate and Rhythm: Normal rate.   Pulmonary:      Effort: Pulmonary effort is normal.   Abdominal:      General: There is no distension.      Tenderness: There is abdominal tenderness.      Comments: IR drain to RUQ   Skin:     General: Skin is warm and dry.   Neurological:      General: No focal deficit present.      Mental Status: He is alert and oriented to person, place, and time.   Psychiatric:         Mood and Affect: Mood normal.         Behavior: Behavior normal.             Labs    Recent Labs     01/02/25 0035 01/03/25 0031 01/04/25 0032   WBC 18.6* 20.4* 16.7*   RBC 3.68* 3.65* 3.60*   HEMOGLOBIN 11.3* 11.0* 10.8*   HEMATOCRIT 34.0* 34.4* 33.7*   MCV 92.4 94.2 93.6   MCH 30.7 30.1 30.0   MCHC 33.2 32.0* 32.0*   RDW 42.4 42.8 42.5   PLATELETCT 291 347 398   MPV 8.9* 8.8* 8.6*     Recent Labs     01/02/25 0035 01/03/25 0031 01/04/25 0032   SODIUM 138 140 138   POTASSIUM 3.5* 3.6 3.8   CHLORIDE 104 104 103   CO2 23 27 26   GLUCOSE 116* 106* 113*   BUN 14 13 18   CREATININE 0.73 0.66 0.62   CALCIUM 8.6 8.4* 8.2*     Recent Labs     01/02/25 0035 01/03/25 0031 01/04/25 0032   ALBUMIN 2.9* 2.9* 3.0*   TBILIRUBIN 0.6 0.4 0.3   ALKPHOSPHAT 161* 139* 135*   TOTPROTEIN 5.8* 5.8* 5.7*   ALTSGPT 76* 54* 45   ASTSGOT 45 23 22   CREATININE 0.73 0.66 0.62     IR-DRAINAGE-CATH EXCHANGE  "  Final Result      1.  Resolving right hepatic abscess.      2.  Successful locking loop catheter exchange.      CT-ABDOMEN WITH   Final Result         1. There are new small effusions and compressive atelectasis, right worse than left.   2. There has been interval percutaneous drainage of the hepatic abscess.   3. Small left renal stone.   4. Mild hepatosplenomegaly.   5. Diverticulosis.   6. 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               CT-DRAIN-LIVER ABSCESS-CYST   Final Result      1.  CT guided placement of a percutaneous drainage catheter in a right hepatic abscess.   2.  The current plan is to obtain a follow-up CT in 5-7 days..      CT-ABDOMEN-PELVIS WITH   Final Result   Addendum (preliminary) 1 of 1   These findings were discussed with JAYDON BEYER on 12/28/2024    9:12 PM.      Final      1.  Large irregular heterogeneous low-attenuation lesion in the RIGHT lobe liver superiorly measuring 5.6 cm, most suggestive of abscess.   2.  Prominent pericaval, portacaval and debora hepatis lymph nodes, inflammatory versus neoplastic.   3.  Mild RIGHT hydronephrosis without obstructing ureteral stone demonstrated.   4.  Small nonobstructing LEFT kidney stone.      DX-CHEST-PORTABLE (1 VIEW)   Final Result      1.  No acute cardiopulmonary disease.   2.  Probable RIGHT lung base Nipple shadows.  Consider confirmation with nonemergent followup chest x-ray with nipple markers and shallow oblique views.         EC-ECHOCARDIOGRAM COMPLETE W/O CONT    (Results Pending)     INR   Date Value Ref Range Status   12/29/2024 1.37 (H) 0.87 - 1.13 Final     Comment:     INR - Non-therapeutic Reference Range: 0.87-1.13  INR - Therapeutic Reference Range: 2.0-4.0       No results found for: \"POCINR\"     Intake/Output Summary (Last 24 hours) at 12/30/2024 1036  Last data filed at 12/30/2024 0948  Gross per 24 hour   Intake 3080 ml   Output 1690 ml   Net 1390 ml      I have personally reviewed the " above labs and imaging      I have performed a physical exam and reviewed and updated ROS and Plan today (1/4/2025).     36 minutes in directly providing and coordinating care and extensive data review.  No time overlap and excludes procedures.

## 2025-01-04 NOTE — CARE PLAN
The patient is Stable - Low risk of patient condition declining or worsening    Shift Goals  Clinical Goals: Patient will report a pain level of 5/10 or less throughout shift and notify staff members if his pain increases above a tolerable level.  Patient Goals: Rest comfortably  Family Goals: JOESPH    Progress made toward(s) clinical / shift goals:  Patient uses his call light appropriately to ask for assistance for anything he needs throughout shift.     Problem: Knowledge Deficit - Standard  Goal: Patient and family/care givers will demonstrate understanding of plan of care, disease process/condition, diagnostic tests and medications  Outcome: Progressing     Problem: Fall Risk  Goal: Patient will remain free from falls  Outcome: Progressing     Problem: Pain - Standard  Goal: Alleviation of pain or a reduction in pain to the patient’s comfort goal  Outcome: Progressing

## 2025-01-05 ENCOUNTER — APPOINTMENT (OUTPATIENT)
Dept: CARDIOLOGY | Facility: MEDICAL CENTER | Age: 70
DRG: 442 | End: 2025-01-05
Attending: STUDENT IN AN ORGANIZED HEALTH CARE EDUCATION/TRAINING PROGRAM
Payer: MEDICARE

## 2025-01-05 LAB
ALBUMIN SERPL BCP-MCNC: 3.1 G/DL (ref 3.2–4.9)
ALBUMIN/GLOB SERPL: 1.1 G/DL
ALP SERPL-CCNC: 132 U/L (ref 30–99)
ALT SERPL-CCNC: 42 U/L (ref 2–50)
ANION GAP SERPL CALC-SCNC: 10 MMOL/L (ref 7–16)
AST SERPL-CCNC: 23 U/L (ref 12–45)
BILIRUB SERPL-MCNC: 0.2 MG/DL (ref 0.1–1.5)
BUN SERPL-MCNC: 14 MG/DL (ref 8–22)
CALCIUM ALBUM COR SERPL-MCNC: 9 MG/DL (ref 8.5–10.5)
CALCIUM SERPL-MCNC: 8.3 MG/DL (ref 8.5–10.5)
CHLORIDE SERPL-SCNC: 102 MMOL/L (ref 96–112)
CO2 SERPL-SCNC: 26 MMOL/L (ref 20–33)
CREAT SERPL-MCNC: 0.7 MG/DL (ref 0.5–1.4)
ERYTHROCYTE [DISTWIDTH] IN BLOOD BY AUTOMATED COUNT: 43.1 FL (ref 35.9–50)
GFR SERPLBLD CREATININE-BSD FMLA CKD-EPI: 98 ML/MIN/1.73 M 2
GLOBULIN SER CALC-MCNC: 2.7 G/DL (ref 1.9–3.5)
GLUCOSE SERPL-MCNC: 104 MG/DL (ref 65–99)
HCT VFR BLD AUTO: 33.6 % (ref 42–52)
HGB BLD-MCNC: 11.1 G/DL (ref 14–18)
LV EJECT FRACT  99904: 65
LV EJECT FRACT MOD 2C 99903: 66.64
LV EJECT FRACT MOD 4C 99902: 52.8
LV EJECT FRACT MOD BP 99901: 60.66
MCH RBC QN AUTO: 31.3 PG (ref 27–33)
MCHC RBC AUTO-ENTMCNC: 33 G/DL (ref 32.3–36.5)
MCV RBC AUTO: 94.6 FL (ref 81.4–97.8)
PLATELET # BLD AUTO: 443 K/UL (ref 164–446)
PMV BLD AUTO: 8.4 FL (ref 9–12.9)
POTASSIUM SERPL-SCNC: 3.8 MMOL/L (ref 3.6–5.5)
PROT SERPL-MCNC: 5.8 G/DL (ref 6–8.2)
RBC # BLD AUTO: 3.55 M/UL (ref 4.7–6.1)
SODIUM SERPL-SCNC: 138 MMOL/L (ref 135–145)
WBC # BLD AUTO: 15.3 K/UL (ref 4.8–10.8)

## 2025-01-05 PROCEDURE — 700105 HCHG RX REV CODE 258: Performed by: INTERNAL MEDICINE

## 2025-01-05 PROCEDURE — 85027 COMPLETE CBC AUTOMATED: CPT

## 2025-01-05 PROCEDURE — 93306 TTE W/DOPPLER COMPLETE: CPT

## 2025-01-05 PROCEDURE — 700111 HCHG RX REV CODE 636 W/ 250 OVERRIDE (IP): Mod: JZ | Performed by: INTERNAL MEDICINE

## 2025-01-05 PROCEDURE — 700102 HCHG RX REV CODE 250 W/ 637 OVERRIDE(OP): Performed by: INTERNAL MEDICINE

## 2025-01-05 PROCEDURE — 700101 HCHG RX REV CODE 250: Performed by: NURSE PRACTITIONER

## 2025-01-05 PROCEDURE — A9270 NON-COVERED ITEM OR SERVICE: HCPCS | Performed by: INTERNAL MEDICINE

## 2025-01-05 PROCEDURE — 99497 ADVNCD CARE PLAN 30 MIN: CPT | Performed by: STUDENT IN AN ORGANIZED HEALTH CARE EDUCATION/TRAINING PROGRAM

## 2025-01-05 PROCEDURE — 770006 HCHG ROOM/CARE - MED/SURG/GYN SEMI*

## 2025-01-05 PROCEDURE — 80053 COMPREHEN METABOLIC PANEL: CPT

## 2025-01-05 PROCEDURE — 99232 SBSQ HOSP IP/OBS MODERATE 35: CPT | Performed by: STUDENT IN AN ORGANIZED HEALTH CARE EDUCATION/TRAINING PROGRAM

## 2025-01-05 PROCEDURE — 36415 COLL VENOUS BLD VENIPUNCTURE: CPT

## 2025-01-05 PROCEDURE — 93306 TTE W/DOPPLER COMPLETE: CPT | Mod: 26 | Performed by: INTERNAL MEDICINE

## 2025-01-05 RX ADMIN — SODIUM CHLORIDE, PRESERVATIVE FREE 10 ML: 5 INJECTION INTRAVENOUS at 05:51

## 2025-01-05 RX ADMIN — AMPICILLIN AND SULBACTAM 3 G: 1; 2 INJECTION, POWDER, FOR SOLUTION INTRAMUSCULAR; INTRAVENOUS at 18:11

## 2025-01-05 RX ADMIN — ENOXAPARIN SODIUM 40 MG: 100 INJECTION SUBCUTANEOUS at 16:30

## 2025-01-05 RX ADMIN — AMPICILLIN AND SULBACTAM 3 G: 1; 2 INJECTION, POWDER, FOR SOLUTION INTRAMUSCULAR; INTRAVENOUS at 05:47

## 2025-01-05 RX ADMIN — SODIUM CHLORIDE, PRESERVATIVE FREE 10 ML: 5 INJECTION INTRAVENOUS at 16:35

## 2025-01-05 RX ADMIN — AMPICILLIN AND SULBACTAM 3 G: 1; 2 INJECTION, POWDER, FOR SOLUTION INTRAMUSCULAR; INTRAVENOUS at 12:04

## 2025-01-05 RX ADMIN — MONTELUKAST 10 MG: 10 TABLET, FILM COATED ORAL at 16:30

## 2025-01-05 RX ADMIN — AMPICILLIN AND SULBACTAM 3 G: 1; 2 INJECTION, POWDER, FOR SOLUTION INTRAMUSCULAR; INTRAVENOUS at 23:45

## 2025-01-05 ASSESSMENT — ENCOUNTER SYMPTOMS
VOMITING: 0
ABDOMINAL PAIN: 1
SHORTNESS OF BREATH: 0
FEVER: 0
WEAKNESS: 0
ABDOMINAL PAIN: 0
CHILLS: 0
PALPITATIONS: 0
NAUSEA: 0

## 2025-01-05 ASSESSMENT — PAIN DESCRIPTION - PAIN TYPE
TYPE: ACUTE PAIN
TYPE: ACUTE PAIN

## 2025-01-05 NOTE — CARE PLAN
The patient is Stable - Low risk of patient condition declining or worsening    Shift Goals  Clinical Goals: Pt will have pain rated at or below 5 out of 10 after interventions during this shift.  Patient Goals: Sleep  Family Goals: Not at bedside    Pt's pain well-controlled during this shift. Patient engaged in POC. Bed in lowest, locked position.    Progress made toward(s) clinical / shift goals:    Problem: Knowledge Deficit - Standard  Goal: Patient and family/care givers will demonstrate understanding of plan of care, disease process/condition, diagnostic tests and medications  Outcome: Progressing     Problem: Fall Risk  Goal: Patient will remain free from falls  Outcome: Progressing     Problem: Pain - Standard  Goal: Alleviation of pain or a reduction in pain to the patient’s comfort goal  Outcome: Progressing     Problem: Discharge Barriers/Planning  Goal: Patient's continuum of care needs are met  Outcome: Progressing     Problem: Gastrointestinal Irritability  Goal: Nausea and vomiting will be absent or improve  Outcome: Progressing       Patient is not progressing towards the following goals:

## 2025-01-05 NOTE — PROGRESS NOTES
[facility] Medicine Daily Progress Note    Date of Service  1/5/2025    Chief Complaint  Jet Webb is a 69 y.o. male admitted 12/28/2024 with fever and chills.    Hospital Course  Jet Webb is a 69-year-old male with PMHx non-Hodgkin's lymphoma, nephrolithiasis, HLD.  Admitted 12/28 for right sided liver abscesses.    Initially, patient presenting with fever, chills. CT of the abdomen pelvis with contrast: Right liver lobe lesion 5.6 cm most suggestive of abscess. Prominent pericaval, portacaval and debora hepatis lymph nodes. Mild right hydronephrosis without obstructing ureteral stones demonstrated.     Patient was started on IV Zosyn.  IR was consulted.  Patient underwent CT-guided drainage on 12/29.    Interval Problem Update  12/30: Vitals notable for Tmax 100.7.  Intermittent tachycardia.  SBP ranging 106 through 159.  DANISHA drain placed yesterday.  Wound cultures pending.    12/31: Patient remains afebrile.   through 144.  Resting comfortably on room air.  WBC 16.5 from 20.8.  Hb stable at 11.1.  LFTs downtrending.  Wound cultures positive for strep intermedius.  Discontinue IV Zosyn.  Start IV Unasyn.  Plan to repeat CT A/P on 1/3. Discussed with JUAN Quintana with IR at bedside. Plan to continue IV antibiotics and repeat CT A/P on 1/3.     1/1: Tmax 100.8 overnight.  COVID, flu, RSV negative.  WBC 17 from 16.5.  Hb stable at 11.8.  LFTs downtrending.  Repeat blood cultures ordered for today.  Planning for CT scan 1/3.    1/2: Tmax 101.4 overnight.  WBC 18.6 from 17.  Hb stable at 11.3.  Potassium 3.5; replaced.  LFTs continue to downtrend.  Repeat CT abdomen showing improvement in abscesses with drain in appropriate placement.  No plans for adjustment this time.  Continue IV Unasyn today.  Follow repeat blood cultures.  Repeat CBC in a.m.    1/3: Afebrile overnight.  WBC 20.4 from 18.6.  Discussed with Kamryn Sung, ABDIRAHMAN and Dr. Russo, infectious disease.  N.p.o. for  percutaneous drain exchange today with IR.    1/4: Afebrile overnight.  SBP ranging 118-142.  WBC 16.7 from 20.4.  Echocardiogram is pending to ensure no vegetations.  Blood cultures remain negative.  Continue IV Unasyn today.  Anticipate discharge early next week with IV antibiotics and close outpatient follow-up.    1/5: Afebrile overnight.   through 142.  WBC 15.3 from 16.7.  Hb stable at 11.1.  Continue IV Unasyn.  Plan on repeat CT abdomen on 1/8 or sooner if output continues to decrease. Echo pending for today.     I have discussed this patient's plan of care and discharge plan at IDT rounds today with Case Management, Nursing, Nursing leadership, and other members of the IDT team.    Consultants/Specialty  infectious disease and IR    Code Status  Full Code    Disposition  The patient is not medically cleared for discharge to home or a post-acute facility.  Anticipate discharge to: home with close outpatient follow-up    I have placed the appropriate orders for post-discharge needs.    Review of Systems  Review of Systems   Constitutional:  Negative for fever and malaise/fatigue.   Respiratory:  Negative for shortness of breath.    Cardiovascular:  Negative for chest pain and leg swelling.   Gastrointestinal:  Positive for abdominal pain. Negative for nausea and vomiting.        Physical Exam  Temp:  [36.3 °C (97.3 °F)-36.7 °C (98.1 °F)] 36.6 °C (97.8 °F)  Pulse:  [62-75] 62  Resp:  [18] 18  BP: (124-136)/(72-75) 124/75  SpO2:  [94 %-96 %] 95 %    Physical Exam  Vitals and nursing note reviewed.   Constitutional:       General: He is not in acute distress.     Appearance: Normal appearance. He is not ill-appearing.   Cardiovascular:      Rate and Rhythm: Normal rate and regular rhythm.   Pulmonary:      Effort: Pulmonary effort is normal.      Breath sounds: Normal breath sounds.   Abdominal:      General: Bowel sounds are normal. There is no distension.      Palpations: Abdomen is soft.       Tenderness: There is no abdominal tenderness.      Comments: DANISHA drain to epigastric region.  Serosanguineous drainage present   Skin:     General: Skin is warm and dry.   Neurological:      Mental Status: He is alert and oriented to person, place, and time. Mental status is at baseline.   Psychiatric:         Mood and Affect: Mood normal.         Behavior: Behavior normal.         Fluids    Intake/Output Summary (Last 24 hours) at 1/5/2025 0936  Last data filed at 1/4/2025 2042  Gross per 24 hour   Intake 640 ml   Output 401.5 ml   Net 238.5 ml        Laboratory  Recent Labs     01/03/25  0031 01/04/25  0032 01/05/25  0132   WBC 20.4* 16.7* 15.3*   RBC 3.65* 3.60* 3.55*   HEMOGLOBIN 11.0* 10.8* 11.1*   HEMATOCRIT 34.4* 33.7* 33.6*   MCV 94.2 93.6 94.6   MCH 30.1 30.0 31.3   MCHC 32.0* 32.0* 33.0   RDW 42.8 42.5 43.1   PLATELETCT 347 398 443   MPV 8.8* 8.6* 8.4*     Recent Labs     01/03/25  0031 01/04/25  0032 01/05/25  0132   SODIUM 140 138 138   POTASSIUM 3.6 3.8 3.8   CHLORIDE 104 103 102   CO2 27 26 26   GLUCOSE 106* 113* 104*   BUN 13 18 14   CREATININE 0.66 0.62 0.70   CALCIUM 8.4* 8.2* 8.3*                     Imaging  IR-DRAINAGE-CATH EXCHANGE   Final Result      1.  Resolving right hepatic abscess.      2.  Successful locking loop catheter exchange.      CT-ABDOMEN WITH   Final Result         1. There are new small effusions and compressive atelectasis, right worse than left.   2. There has been interval percutaneous drainage of the hepatic abscess.   3. Small left renal stone.   4. Mild hepatosplenomegaly.   5. Diverticulosis.   6. 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               CT-DRAIN-LIVER ABSCESS-CYST   Final Result      1.  CT guided placement of a percutaneous drainage catheter in a right hepatic abscess.   2.  The current plan is to obtain a follow-up CT in 5-7 days..      CT-ABDOMEN-PELVIS WITH   Final Result   Addendum (preliminary) 1 of 1   These findings were discussed with  JAYDON BEYER on 12/28/2024    9:12 PM.      Final      1.  Large irregular heterogeneous low-attenuation lesion in the RIGHT lobe liver superiorly measuring 5.6 cm, most suggestive of abscess.   2.  Prominent pericaval, portacaval and debora hepatis lymph nodes, inflammatory versus neoplastic.   3.  Mild RIGHT hydronephrosis without obstructing ureteral stone demonstrated.   4.  Small nonobstructing LEFT kidney stone.      DX-CHEST-PORTABLE (1 VIEW)   Final Result      1.  No acute cardiopulmonary disease.   2.  Probable RIGHT lung base Nipple shadows.  Consider confirmation with nonemergent followup chest x-ray with nipple markers and shallow oblique views.         EC-ECHOCARDIOGRAM COMPLETE W/O CONT    (Results Pending)        Assessment/Plan  * Liver abscess- (present on admission)  Assessment & Plan  CT of the abdomen pelvis with contrast: Right liver lobe lesion 5.6 cm most suggestive of abscess.  S/p drain placement 12/29  Cultures growing strep intermedius  Repeat CT abdomen 1/2: Reidentified is an area of ill-defined low-attenuation in the liver centrally. This measures 7.2 x 6.5 x 5.7 cm. There is a pigtail drainage catheter in this region on the current exam along with some new gas.  IR DANISHA drain exchange 1/3  WBC continues to improve; 15.3 today  - Continue IV Unasyn  - ID following  - Repeat CMP and CBC in a.m.  - Continue drain management per IR recommendations    Bacteremia  Assessment & Plan  Blood cultures positive for strep intermedius 12/28  Repeat blood cultures 1/1 NGTD  - Infectious diseases following  - Continue IV Unasyn  - Echocardiogram is pending    ACP (advance care planning)  Assessment & Plan  Patient admitted with liver abscess.  History of non-Hodgkin lymphoma.  Age of 69.  I discussed the CODE STATUS with him and his  at bedside, including CPR, intubation/mechanical ventilation.  He wants to stay full code if there is no terminal diagnosis or mental inability.   Continue full code.  ACP 16 minutes.  Above per previous hospitalist     Elevated LFTs  Assessment & Plan  LFTs improving  Presumed secondary to liver abscess  Hepatitis panel negative  - Repeat CMP in a.m.    Hydronephrosis of right kidney  Assessment & Plan  Incidental finding on CT of the abdomen with contrast: Mild right hydronephrosis without obstructing ureteral stone.    Dyslipidemia- (present on admission)  Assessment & Plan  Will hold Lipitor due to elevated LFTs    Non-Hodgkin's lymphoma (HCC)- (present on admission)  Assessment & Plan  In remission for 4-5 years   per oncology note is low-grade, not on active treatments.    Continue  surveillance per Dr. Chen         VTE prophylaxis:   SCDs/TEDs   enoxaparin ppx      I have performed a physical exam and reviewed and updated ROS and Plan today (1/5/2025). In review of yesterday's note (1/4/2025), there are no changes except as documented above.

## 2025-01-05 NOTE — CARE PLAN
The patient is Stable - Low risk of patient condition declining or worsening    Shift Goals  Clinical Goals: Patient to remain at stated 0 pain and voice to nurse during shift if any pain is experienced  Patient Goals: Rest comfortably  Family Goals: no family present    Progress made toward(s) clinical / shift goals:      Patient is not reporting any pain upon assessment @ 8:30am.

## 2025-01-05 NOTE — PROGRESS NOTES
Infectious Disease Progress Note    Author: Richard Pickard M.D. Date & Time of service: 2025  4:39 PM    Chief Complaint:   liver abscess  Strep bacteremia    Interval History:   69 y.o. male admitted 2024 for fever and chills due to above above  25 AF WBC 17 In the bathroom again today-no acute events overnight  25 AF WBC 18.6 Had CT done this am-results reviewed and plan of care discussed  1/3 AF WBC 20.4 planned for new drain today Up to chair-NAD   patient remains afebrile, count down to 16.7, underwent drain exchange 1/3, cultures as below, creatinine 0.62, normal transaminases, alk phos trending down    Labs Reviewed, Medications Reviewed, and Radiology Reviewed.    Review of Systems:  Review of Systems   Constitutional:  Negative for fever.   Respiratory:  Negative for shortness of breath.    Gastrointestinal:  Negative for nausea and vomiting.   All other systems reviewed and are negative.      Hemodynamics:  Temp (24hrs), Av.7 °C (98.1 °F), Min:36.6 °C (97.8 °F), Max:36.9 °C (98.4 °F)  Temperature: 36.7 °C (98.1 °F)  Pulse  Av.2  Min: 60  Max: 103   Blood Pressure : 127/72       Physical Exam:  Physical Exam  Vitals and nursing note reviewed.   Constitutional:       General: He is not in acute distress.     Appearance: He is not ill-appearing, toxic-appearing or diaphoretic.   Eyes:      General: No scleral icterus.     Extraocular Movements: Extraocular movements intact.      Pupils: Pupils are equal, round, and reactive to light.   Cardiovascular:      Rate and Rhythm: Normal rate.   Pulmonary:      Effort: Pulmonary effort is normal. No respiratory distress.      Breath sounds: No stridor.   Abdominal:      General: There is no distension.      Comments: Drain serosanguinous, minimal   Skin:     Coloration: Skin is not jaundiced.   Neurological:      General: No focal deficit present.      Mental Status: He is alert and oriented to person, place, and time.          Meds:    Current Facility-Administered Medications:     ibuprofen    ampicillin-sulbactam (UNASYN) IV    normal saline flush    enoxaparin (LOVENOX) injection    acetaminophen    Notify provider if pain remains uncontrolled **AND** Use the Numeric Rating Scale (NRS), Walsh-Baker Faces (WBF), or FLACC on regular floors and Critical-Care Pain Observation Tool (CPOT) on ICUs/Trauma to assess pain **AND** Pulse Ox **AND** Pharmacy Consult Request **AND** If patient difficult to arouse and/or has respiratory depression (respiratory rate of 10 or less), stop any opiates that are currently infusing and call a Rapid Response.    oxyCODONE immediate-release **OR** oxyCODONE immediate-release **OR** morphine injection    senna-docusate **AND** polyethylene glycol/lytes    labetalol    ondansetron    ondansetron    montelukast    Labs:  Recent Labs     01/02/25  0035 01/03/25  0031 01/04/25  0032   WBC 18.6* 20.4* 16.7*   RBC 3.68* 3.65* 3.60*   HEMOGLOBIN 11.3* 11.0* 10.8*   HEMATOCRIT 34.0* 34.4* 33.7*   MCV 92.4 94.2 93.6   MCH 30.7 30.1 30.0   RDW 42.4 42.8 42.5   PLATELETCT 291 347 398   MPV 8.9* 8.8* 8.6*     Recent Labs     01/02/25  0035 01/03/25  0031 01/04/25  0032   SODIUM 138 140 138   POTASSIUM 3.5* 3.6 3.8   CHLORIDE 104 104 103   CO2 23 27 26   GLUCOSE 116* 106* 113*   BUN 14 13 18     Recent Labs     01/02/25  0035 01/03/25  0031 01/04/25  0032   ALBUMIN 2.9* 2.9* 3.0*   TBILIRUBIN 0.6 0.4 0.3   ALKPHOSPHAT 161* 139* 135*   TOTPROTEIN 5.8* 5.8* 5.7*   ALTSGPT 76* 54* 45   ASTSGOT 45 23 22   CREATININE 0.73 0.66 0.62       Imaging:  CT-DRAIN-LIVER ABSCESS-CYST    Result Date: 12/29/2024 12/29/2024 11:48 AM HISTORY/REASON FOR EXAM:  SENT FROM URGENT CARE, FEVER, URINARY FREQUENCY TECHNIQUE/EXAM DESCRIPTION: Hepatic abscess drainage with CT guidance. Low dose optimization technique was utilized for this CT exam including automated exposure control and adjustment of the mA and/or kV according to patient  size. PROCEDURE: Informed consent was obtained. Moderate sedation with Fentanyl and Versed was administered during the procedure with appropriate continuous patient monitoring by the radiology nurse. Intraservice duration: 20 minutes Localizing CT images were obtained with the patient in supine position. The skin was prepped with ChloraPrep and draped in a sterile fashion. Following local anesthesia with 1% Lidocaine, a 17 G guiding needle was placed and needle path confirmed with CT. An Amplatz guidewire was placed and following serial tract dilatation, a 12 Macedonian pigtail locking catheter was placed. A specimen was collected and submitted for culture and sensitivity and Gram stain. Total of 40 mL bloody purulent fluid was drained. The catheter was secured to the skin and connected to suction bulb drainage. Final CT images were obtained documenting catheter position. The patient tolerated the procedure well with no evidence of complication. COMPARISON: None available. FINDINGS: The final CT images show satisfactory catheter position within the target collection.     1.  CT guided placement of a percutaneous drainage catheter in a right hepatic abscess. 2.  The current plan is to obtain a follow-up CT in 5-7 days..    CT-ABDOMEN-PELVIS WITH    Addendum Date: 12/28/2024    These findings were discussed with JAYDON BEYER on 12/28/2024 9:12 PM.    Result Date: 12/28/2024 12/28/2024 8:39 PM HISTORY/REASON FOR EXAM:  Fever, history of lymphoma and adenopathy.  COMPARISON: No prior studies available. FINDINGS: Lower Chest: Minimal dependent atelectasis. Liver: Heterogeneous irregular low-attenuation structure in the superior RIGHT lobe liver measuring 5.1 x 5.7 x 4.5 cm. Spleen: Enlarged measuring 14.4 cm. Pancreas: Unremarkable. Gallbladder: No calcified stones. Biliary: Nondilated. Adrenal glands: Normal. Kidneys: Small nonobstructing LEFT kidney stone.  RIGHT kidney shows mildly prominent collecting system  and proximal ureter without calcified stone demonstrated.. Bowel: No bowel obstruction or focal bowel wall thickening. Lymph nodes: Multiple mildly enlarged  debora hepatis and portacaval lymph nodes measuring up to 16 mm short axis.  Mildly prominent pericaval lymph nodes. Vasculature: Unremarkable. Peritoneum: Unremarkable without ascites. Musculoskeletal: Degenerative disc disease at L5-S1 with grade 2 anterolisthesis.  Probable L5 pars defects.  LEFT hip prosthesis. Pelvis: Bladder is unremarkable.  No dependent fluid.     1.  Large irregular heterogeneous low-attenuation lesion in the RIGHT lobe liver superiorly measuring 5.6 cm, most suggestive of abscess. 2.  Prominent pericaval, portacaval and debora hepatis lymph nodes, inflammatory versus neoplastic. 3.  Mild RIGHT hydronephrosis without obstructing ureteral stone demonstrated. 4.  Small nonobstructing LEFT kidney stone.    DX-CHEST-PORTABLE (1 VIEW)    Result Date: 12/28/2024 12/28/2024 7:57 PM HISTORY/REASON FOR EXAM:  Sepsis; sepsis. Fever. TECHNIQUE/EXAM DESCRIPTION AND NUMBER OF VIEWS: Single portable view of the chest. COMPARISON: None FINDINGS: Cardiomediastinal contour is within normal limits. No focal pulmonary consolidation. Probable nipple shadow of the RIGHT lung base. No pleural fluid collection or pneumothorax. No major bony abnormality is seen.     1.  No acute cardiopulmonary disease. 2.  Probable RIGHT lung base Nipple shadows.  Consider confirmation with nonemergent followup chest x-ray with nipple markers and shallow oblique views.       Micro:  Results       Procedure Component Value Units Date/Time    BLOOD CULTURE [[id number]] Collected: 01/01/25 1612    Order Status: Completed Specimen: Blood from Peripheral Updated: 01/01/25 2008     Significant Indicator NEG     Source BLD     Site Peripheral     Culture Result No Growth  Note: Blood cultures are incubated for 5 days and  are monitored continuously.Positive blood cultures  are called  to the RN and reported as soon as  they are identified.      BLOOD CULTURE [[id number]] Collected: 01/01/25 1612    Order Status: Completed Specimen: Blood from Peripheral Updated: 01/01/25 2008     Significant Indicator NEG     Source BLD     Site Peripheral     Culture Result No Growth  Note: Blood cultures are incubated for 5 days and  are monitored continuously.Positive blood cultures  are called to the RN and reported as soon as  they are identified.      Blood Culture - Draw one from central line and one from peripheral site [[id number]]  (Abnormal) Collected: 12/28/24 1942    Order Status: Completed Specimen: Blood from Line Updated: 01/01/25 0719     Significant Indicator POS     Source BLD     Site Peripheral     Culture Result Growth detected by automated blood culture system.  12/29/2024  22:49        Streptococcus intermedius  See previous culture for sensitivity report.      E-Test [[id number]] Collected: 12/28/24 1942    Order Status: Completed Specimen: Other Updated: 01/01/25 0719     ETEST Sensitivity FINAL    Narrative:      61 tel. [id number] 12/29/2024, 18:25, RB PERF. RESULTS CALLED TO:[id number]    Blood Culture - Draw one from central line and one from peripheral site [[id number]]  (Abnormal)  (Susceptibility) Collected: 12/28/24 1942    Order Status: Completed Specimen: Blood from Peripheral Updated: 01/01/25 0719     Significant Indicator POS     Source BLD     Site PERIPHERAL     Culture Result Growth detected by automated blood culture system. 12/29/2024  18:22        Streptococcus intermedius    Susceptibility       Streptococcus intermedius (1)       Antibiotic Interpretation Microscan   Method Status    Cefotaxime Sensitive 0.094 mcg/mL E-TEST Final                       CoV-2, Flu A/B, And RSV by PCR (CepDovme Kosmeticsid) [[id number]] Collected: 12/31/24 1616    Order Status: Completed Specimen: Respirate from Nasopharyngeal Updated: 12/31/24 1718     Influenza virus A RNA Negative     Influenza virus B,  PCR Negative     RSV, PCR Negative     SARS-CoV-2 by PCR NotDetected     Comment: [facility] providers: PLEASE REFER TO DE-ESCALATION AND RETESTING PROTOCOL  on [email or web address]    **The [facility] GeneXpert Xpress SARS-CoV-2 RT-PCR Test has been made  available for use under the Emergency Use Authorization (EUA) only.          SARS-CoV-2 Source NP Swab    CULTURE WOUND W/ GRAM STAIN [[id number]]  (Abnormal) Collected: 12/29/24 1215    Order Status: Completed Specimen: Wound Updated: 12/31/24 0942     Significant Indicator POS     Source WND     Site Liver abscess     Culture Result -     Gram Stain Result Many WBCs.  Many Gram positive cocci in chains.       Culture Result Streptococcus intermedius  Heavy growth      Urine Culture (New) [[id number]] Collected: 12/28/24 2011    Order Status: Completed Specimen: Urine Updated: 12/30/24 0714     Significant Indicator NEG     Source UR     Site -     Culture Result No growth at 48 hours.    GRAM STAIN [[id number]] Collected: 12/29/24 1215    Order Status: Completed Specimen: Wound Updated: 12/29/24 1812     Significant Indicator .     Source WND     Site Liver abscess     Gram Stain Result Many WBCs.  Many Gram positive cocci in chains.      FLUID CULTURE W/GRAM STAIN [[id number]]     Order Status: Canceled Specimen: Other Body Fluid     Urinalysis [[id number]] Collected: 12/28/24 2011    Order Status: Completed Specimen: Urine Updated: 12/28/24 2023     Color Yellow     Character Clear     Specific Gravity 1.008     Ph 5.5     Glucose Negative mg/dL      Ketones Negative mg/dL      Protein Negative mg/dL      Bilirubin Negative     Urobilinogen, Urine 1.0 EU/dL      Nitrite Negative     Leukocyte Esterase Negative     Occult Blood Negative     Micro Urine Req see below     Comment: Microscopic examination not performed when specimen is clear  and chemically negative for protein, blood, leukocyte esterase  and nitrite.                 Assessment/Hospital course:  This is a  69-year-old male patient with history of non-Hodgkin's lymphoma currently on surveillance, history of nephrolithiasis, hyperlipidemia, presented with fevers and chills with a CT scan consistent with a 5.6 cm right liver abscess.  Patient underwent CT-guided drainage on 12/29, repeat CT scan with no improvement so drain exchange performed by IR on 1/3.  Blood cultures x 2 from 12/28 and IR drain cultures positive for Strep intermedius.  Patient did have dental work done, 3 crowns, before Thanksgiving.  No dental abscesses, no current toothache    Pertinent Diagnoses:  Strep intermedius bacteremia  Liver abscess  Non-Hodgkin's lymphoma, currently on surveillance     PLAN:   -Continue IV Unasyn 3 g every 6 hours, decreased risk of C. Difficile  -Follow repeat blood cultures x 2 from 1/1/2025, no growth till date  -Repeat CT scan anticipated ~1/8, or sooner if low output  -TTE pending    Disposition: Unable to determine at this time  Need for PICC line: Unable to determine at this time    Discussed with PharmD.  ID will follow.  This illness poses a threat to life At today's visit, I engaged in complex medical decision making associated with antimicrobial prescribing.  The additions/changes made today were made in order to optimize treatment and minimize risk of adverse effects including C. difficile and risk of future resistance, taking into account resistance patterns and overall history including recent antibiotic exposure. Patient was counseled regarding the rationale and risks/benefits of these antibiotic decisions.

## 2025-01-05 NOTE — PROGRESS NOTES
Radiology Progress Note   Author: HEATHER Osorio Date & Time created: 1/5/2025  10:20 AM   Date of admission  12/28/2024  Note to reader: this note follows the APSO format rather than the historical SOAP format. Assessment and plan located at the top of the note for ease of use.    Chief Complaint  69 y.o. male admitted 12/28/2024 with   Chief Complaint   Patient presents with    Sent from Urgent Care      wbc 24, fever, chills. Called patient who requested antibiotics however at this time there is no clear source of infection. He has a history of non-Hodgkin's lymphoma and reports new lymphadenopathy. Further work up is needed as fevers ar on going with no known source of infection. Advised patient to go to  ED further evaluation; he verbalized understanding and intention to go to ED.    Fever     X 3 days now. Pt states taking advil for fever however it comes right back.     Urinary Frequency     Pt endorses urinary urgency  has gotten much worse the last 3/4 days          HPI  69-year-old male with past medical history significant for non-Hodgkin's lymphoma, nephrolithiasis, HLD admitted 12/28/2024 for CT finding of right hepatic fluid collection concerning for abscess after presenting to the ER with fevers and chills.  IR was consulted and patient underwent CT-guided liver abscess drain placement with IR Dr. Chandler on 12/29/2024    Interval History:   12/30/2024 -hepatic abscess 12F drain to RUQ with 140 mL slightly turbid serosanguinous output in the last 24 hours. Labs reviewed; WBC 20.8, H&H 11.5/33.9, , , alk phos 126, creatinine 0.84.  Hepatic fluid cultures pending. Drain flushed with 10 mL NS. I reviewed IDT notes and images.    12/31/24 - Hepatic abscess 12F drain to RUQ with 68 mL seropurulent output in the last 24 hours. Labs reviewed; WBC 16.5, H&H 11.1/33.0, AST 52, , alk phos 125, creatinine 0.73.  Hepatic fluid cultures preliminarily positive for streptococcus  intermedius. Drain flushed with 10 mL NS. I reviewed IDT notes and images and discussed patient with Dr. Griffin.     01/01/25 - Hepatic abscess 12F drain to RUQ with 50 mL serosanguinous output in the last 24 hours. Labs reviewed; WBC 17.0, H&H 11.8/35.8, AST 48, ALT 91, alk phos 149, creatinine 0.78.  Hepatic fluid cultures positive for streptococcus intermedius, on abx through ID. Drain flushed with 20 mL NS with 10 mLs able to be aspirated back. I reviewed IDT notes.     01/02/25 - Hepatic abscess 12F drain to RUQ with 27.5 mL serosanguinous output in the last 24 hours. Labs reviewed; WBC 18.6, H&H 11.3/34.0, AST 45, ALT 76, alk phos 161, creatinine 0.73.  Drain flushed with 20 mL NS with 10 mLs able to be aspirated back. I reviewed IDT notes and discussed POC with Dr Griffin.     01/03/25 - Hepatic abscess 12F drain to RUQ with 10 mL serosanguinous output in the last 24 hours. Labs reviewed; WBC 20.4, H&H 11.0/34.4, AST 23, ALT 54, alk phos 139, creatinine 0.66.  I reviewed IDT notes and discussed POC with Dr Griffin.     01/04/25 - Hepatic abscess 12F drain to RUQ with 1.5 mL serosanguinous output in the last 24 hours. Labs reviewed; WBC 16.7, H&H 10.8/33.7, AST 22, ALT 45, alk phos 135, creatinine 0.62.  I reviewed IDT notes, imaging from recent procedure, and discussed POC with Dr Griffin.     01/05/25 - Hepatic abscess 12F drain to RUQ with no output recorded in the last 24 hours. Labs reviewed; WBC 15.3, H&H 11.1/33.6, AST 23, ALT 42, alk phos 132, creatinine 0.62.  I reviewed IDT notes, and discussed POC with Dr Griffin and Dr Pickard, ID.    Assessment/Plan     Principal Problem:    Liver abscess  Active Problems:    Non-Hodgkin's lymphoma (HCC)    Dyslipidemia    Hydronephrosis of right kidney    Elevated LFTs    ACP (advance care planning)    Bacteremia      Plan IR  - Continue order to irrigate RUQ drain with 10 ml of sterile saline each shift  - Fluid cultures positive for streptococcus  intermedius  - ID following   - CT 01/02/25 shows slightly improved hepatic abscess with drain in correct place.   - Hepatic drain exchanged 01/03/25   - Repeat CT 01/08 if pt still in the hospital.   - OK to discharge pt from an IR standpoint with drain in place and ID follow up if white count continues to improve and pt is medically cleared.   - Drain care education and flushing materials provided to patient  - Continue to monitor drains, VS, and labs    -  -Thank you for allowing Interventional Radiology team to participate in the patients care, if any additional care or requests are needed in the future please do not hesitate to call or place IR order           Review of Systems  Physical Exam   Review of Systems   Constitutional:  Negative for chills and fever.   Respiratory:  Negative for shortness of breath.    Cardiovascular:  Negative for chest pain and palpitations.   Gastrointestinal:  Negative for abdominal pain, nausea and vomiting.   Neurological:  Negative for weakness.      Vitals:    01/05/25 0725   BP: 124/75   Pulse: 62   Resp: 18   Temp: 36.6 °C (97.8 °F)   SpO2: 95%        Physical Exam  Cardiovascular:      Rate and Rhythm: Normal rate.   Pulmonary:      Effort: Pulmonary effort is normal.   Abdominal:      General: There is no distension.      Tenderness: There is no abdominal tenderness.      Comments: IR drain to RUQ   Skin:     General: Skin is warm and dry.   Neurological:      General: No focal deficit present.      Mental Status: He is alert and oriented to person, place, and time.   Psychiatric:         Mood and Affect: Mood normal.         Behavior: Behavior normal.             Labs    Recent Labs     01/03/25  0031 01/04/25  0032 01/05/25  0132   WBC 20.4* 16.7* 15.3*   RBC 3.65* 3.60* 3.55*   HEMOGLOBIN 11.0* 10.8* 11.1*   HEMATOCRIT 34.4* 33.7* 33.6*   MCV 94.2 93.6 94.6   MCH 30.1 30.0 31.3   MCHC 32.0* 32.0* 33.0   RDW 42.8 42.5 43.1   PLATELETCT 347 398 443   MPV 8.8* 8.6* 8.4*      Recent Labs     01/03/25  0031 01/04/25  0032 01/05/25  0132   SODIUM 140 138 138   POTASSIUM 3.6 3.8 3.8   CHLORIDE 104 103 102   CO2 27 26 26   GLUCOSE 106* 113* 104*   BUN 13 18 14   CREATININE 0.66 0.62 0.70   CALCIUM 8.4* 8.2* 8.3*     Recent Labs     01/03/25  0031 01/04/25  0032 01/05/25  0132   ALBUMIN 2.9* 3.0* 3.1*   TBILIRUBIN 0.4 0.3 0.2   ALKPHOSPHAT 139* 135* 132*   TOTPROTEIN 5.8* 5.7* 5.8*   ALTSGPT 54* 45 42   ASTSGOT 23 22 23   CREATININE 0.66 0.62 0.70     IR-DRAINAGE-CATH EXCHANGE   Final Result      1.  Resolving right hepatic abscess.      2.  Successful locking loop catheter exchange.      CT-ABDOMEN WITH   Final Result         1. There are new small effusions and compressive atelectasis, right worse than left.   2. There has been interval percutaneous drainage of the hepatic abscess.   3. Small left renal stone.   4. Mild hepatosplenomegaly.   5. Diverticulosis.   6. 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               CT-DRAIN-LIVER ABSCESS-CYST   Final Result      1.  CT guided placement of a percutaneous drainage catheter in a right hepatic abscess.   2.  The current plan is to obtain a follow-up CT in 5-7 days..      CT-ABDOMEN-PELVIS WITH   Final Result   Addendum (preliminary) 1 of 1   These findings were discussed with JAYDON BEYER on 12/28/2024    9:12 PM.      Final      1.  Large irregular heterogeneous low-attenuation lesion in the RIGHT lobe liver superiorly measuring 5.6 cm, most suggestive of abscess.   2.  Prominent pericaval, portacaval and debora hepatis lymph nodes, inflammatory versus neoplastic.   3.  Mild RIGHT hydronephrosis without obstructing ureteral stone demonstrated.   4.  Small nonobstructing LEFT kidney stone.      DX-CHEST-PORTABLE (1 VIEW)   Final Result      1.  No acute cardiopulmonary disease.   2.  Probable RIGHT lung base Nipple shadows.  Consider confirmation with nonemergent followup chest x-ray with nipple markers and shallow  "oblique views.         EC-ECHOCARDIOGRAM COMPLETE W/O CONT    (Results Pending)     INR   Date Value Ref Range Status   12/29/2024 1.37 (H) 0.87 - 1.13 Final     Comment:     INR - Non-therapeutic Reference Range: 0.87-1.13  INR - Therapeutic Reference Range: 2.0-4.0       No results found for: \"POCINR\"     Intake/Output Summary (Last 24 hours) at 12/30/2024 1036  Last data filed at 12/30/2024 0948  Gross per 24 hour   Intake 3080 ml   Output 1690 ml   Net 1390 ml      I have personally reviewed the above labs and imaging      I have performed a physical exam and reviewed and updated ROS and Plan today (1/5/2025).     36 minutes in directly providing and coordinating care and extensive data review.  No time overlap and excludes procedures.   "

## 2025-01-06 ENCOUNTER — APPOINTMENT (OUTPATIENT)
Dept: INTERNAL MEDICINE | Facility: OTHER | Age: 70
End: 2025-01-06
Payer: MEDICARE

## 2025-01-06 LAB
ALBUMIN SERPL BCP-MCNC: 3.2 G/DL (ref 3.2–4.9)
ALBUMIN/GLOB SERPL: 1.1 G/DL
ALP SERPL-CCNC: 142 U/L (ref 30–99)
ALT SERPL-CCNC: 43 U/L (ref 2–50)
ANION GAP SERPL CALC-SCNC: 10 MMOL/L (ref 7–16)
AST SERPL-CCNC: 32 U/L (ref 12–45)
BACTERIA BLD CULT: NORMAL
BACTERIA BLD CULT: NORMAL
BILIRUB SERPL-MCNC: 0.3 MG/DL (ref 0.1–1.5)
BUN SERPL-MCNC: 14 MG/DL (ref 8–22)
CALCIUM ALBUM COR SERPL-MCNC: 9.7 MG/DL (ref 8.5–10.5)
CALCIUM SERPL-MCNC: 9.1 MG/DL (ref 8.5–10.5)
CHLORIDE SERPL-SCNC: 103 MMOL/L (ref 96–112)
CO2 SERPL-SCNC: 25 MMOL/L (ref 20–33)
CREAT SERPL-MCNC: 0.74 MG/DL (ref 0.5–1.4)
ERYTHROCYTE [DISTWIDTH] IN BLOOD BY AUTOMATED COUNT: 42.8 FL (ref 35.9–50)
GFR SERPLBLD CREATININE-BSD FMLA CKD-EPI: 97 ML/MIN/1.73 M 2
GLOBULIN SER CALC-MCNC: 2.9 G/DL (ref 1.9–3.5)
GLUCOSE SERPL-MCNC: 101 MG/DL (ref 65–99)
HCT VFR BLD AUTO: 36.4 % (ref 42–52)
HGB BLD-MCNC: 11.8 G/DL (ref 14–18)
MCH RBC QN AUTO: 30.4 PG (ref 27–33)
MCHC RBC AUTO-ENTMCNC: 32.4 G/DL (ref 32.3–36.5)
MCV RBC AUTO: 93.8 FL (ref 81.4–97.8)
PLATELET # BLD AUTO: 519 K/UL (ref 164–446)
PMV BLD AUTO: 8.3 FL (ref 9–12.9)
POTASSIUM SERPL-SCNC: 4.2 MMOL/L (ref 3.6–5.5)
PROT SERPL-MCNC: 6.1 G/DL (ref 6–8.2)
RBC # BLD AUTO: 3.88 M/UL (ref 4.7–6.1)
SIGNIFICANT IND 70042: NORMAL
SIGNIFICANT IND 70042: NORMAL
SITE SITE: NORMAL
SITE SITE: NORMAL
SODIUM SERPL-SCNC: 138 MMOL/L (ref 135–145)
SOURCE SOURCE: NORMAL
SOURCE SOURCE: NORMAL
WBC # BLD AUTO: 16.5 K/UL (ref 4.8–10.8)

## 2025-01-06 PROCEDURE — 36415 COLL VENOUS BLD VENIPUNCTURE: CPT

## 2025-01-06 PROCEDURE — 700105 HCHG RX REV CODE 258: Performed by: INTERNAL MEDICINE

## 2025-01-06 PROCEDURE — 99497 ADVNCD CARE PLAN 30 MIN: CPT | Performed by: STUDENT IN AN ORGANIZED HEALTH CARE EDUCATION/TRAINING PROGRAM

## 2025-01-06 PROCEDURE — 80053 COMPREHEN METABOLIC PANEL: CPT

## 2025-01-06 PROCEDURE — A9270 NON-COVERED ITEM OR SERVICE: HCPCS | Performed by: INTERNAL MEDICINE

## 2025-01-06 PROCEDURE — 700111 HCHG RX REV CODE 636 W/ 250 OVERRIDE (IP): Mod: JZ | Performed by: INTERNAL MEDICINE

## 2025-01-06 PROCEDURE — 700101 HCHG RX REV CODE 250: Performed by: NURSE PRACTITIONER

## 2025-01-06 PROCEDURE — 99232 SBSQ HOSP IP/OBS MODERATE 35: CPT | Performed by: STUDENT IN AN ORGANIZED HEALTH CARE EDUCATION/TRAINING PROGRAM

## 2025-01-06 PROCEDURE — 770006 HCHG ROOM/CARE - MED/SURG/GYN SEMI*

## 2025-01-06 PROCEDURE — 700102 HCHG RX REV CODE 250 W/ 637 OVERRIDE(OP): Performed by: INTERNAL MEDICINE

## 2025-01-06 PROCEDURE — 85027 COMPLETE CBC AUTOMATED: CPT

## 2025-01-06 RX ADMIN — SODIUM CHLORIDE, PRESERVATIVE FREE 10 ML: 5 INJECTION INTRAVENOUS at 06:00

## 2025-01-06 RX ADMIN — AMPICILLIN AND SULBACTAM 3 G: 1; 2 INJECTION, POWDER, FOR SOLUTION INTRAMUSCULAR; INTRAVENOUS at 17:44

## 2025-01-06 RX ADMIN — AMPICILLIN AND SULBACTAM 3 G: 1; 2 INJECTION, POWDER, FOR SOLUTION INTRAMUSCULAR; INTRAVENOUS at 05:33

## 2025-01-06 RX ADMIN — AMPICILLIN AND SULBACTAM 3 G: 1; 2 INJECTION, POWDER, FOR SOLUTION INTRAMUSCULAR; INTRAVENOUS at 12:17

## 2025-01-06 RX ADMIN — SODIUM CHLORIDE, PRESERVATIVE FREE 10 ML: 5 INJECTION INTRAVENOUS at 17:05

## 2025-01-06 RX ADMIN — MONTELUKAST 10 MG: 10 TABLET, FILM COATED ORAL at 17:05

## 2025-01-06 RX ADMIN — AMPICILLIN AND SULBACTAM 3 G: 1; 2 INJECTION, POWDER, FOR SOLUTION INTRAMUSCULAR; INTRAVENOUS at 23:08

## 2025-01-06 RX ADMIN — ENOXAPARIN SODIUM 40 MG: 100 INJECTION SUBCUTANEOUS at 17:05

## 2025-01-06 ASSESSMENT — ENCOUNTER SYMPTOMS
WEAKNESS: 0
VOMITING: 0
MYALGIAS: 0
ABDOMINAL PAIN: 0
COUGH: 0
CHILLS: 0
SHORTNESS OF BREATH: 0
NAUSEA: 0
FEVER: 0

## 2025-01-06 ASSESSMENT — PAIN DESCRIPTION - PAIN TYPE: TYPE: ACUTE PAIN

## 2025-01-06 NOTE — PROGRESS NOTES
Infectious Disease Progress Note    Author: Richard Pickard M.D. Date & Time of service: 2025  8:27 AM    Chief Complaint:   liver abscess  Strep bacteremia    Interval History:   69 y.o. male admitted 2024 for fever and chills due to above above  25 AF WBC 17 In the bathroom again today-no acute events overnight  25 AF WBC 18.6 Had CT done this am-results reviewed and plan of care discussed  1/3 AF WBC 20.4 planned for new drain today Up to chair-NAD   patient remains afebrile, count down to 16.7, underwent drain exchange 1/3, cultures as below, creatinine 0.62, normal transaminases, alk phos trending down   Tmax 99.1, white count up to 16.5, cultures as below, 20 cc recorded over the past 24 hours, normal transaminases    Labs Reviewed, Medications Reviewed, and Radiology Reviewed.    Review of Systems:  Review of Systems   Constitutional:  Negative for fever.   Respiratory:  Negative for shortness of breath.    Gastrointestinal:  Negative for nausea and vomiting.   All other systems reviewed and are negative.      Hemodynamics:  Temp (24hrs), Av.7 °C (98 °F), Min:36.2 °C (97.2 °F), Max:37.3 °C (99.1 °F)  Temperature: 36.2 °C (97.2 °F)  Pulse  Av.9  Min: 60  Max: 103   Blood Pressure : 117/68       Physical Exam:  Physical Exam  Vitals and nursing note reviewed.   Constitutional:       General: He is not in acute distress.     Appearance: He is not ill-appearing, toxic-appearing or diaphoretic.   Eyes:      General: No scleral icterus.     Extraocular Movements: Extraocular movements intact.      Pupils: Pupils are equal, round, and reactive to light.   Cardiovascular:      Rate and Rhythm: Normal rate.   Pulmonary:      Effort: Pulmonary effort is normal. No respiratory distress.      Breath sounds: No stridor.   Abdominal:      General: There is no distension.      Comments: Drain serosanguinous, minimal   Skin:     Coloration: Skin is not jaundiced.   Neurological:       General: No focal deficit present.      Mental Status: He is alert and oriented to person, place, and time.         Meds:    Current Facility-Administered Medications:     ibuprofen    ampicillin-sulbactam (UNASYN) IV    normal saline flush    enoxaparin (LOVENOX) injection    acetaminophen    Notify provider if pain remains uncontrolled **AND** Use the Numeric Rating Scale (NRS), Walsh-Baker Faces (WBF), or FLACC on regular floors and Critical-Care Pain Observation Tool (CPOT) on ICUs/Trauma to assess pain **AND** Pulse Ox **AND** Pharmacy Consult Request **AND** If patient difficult to arouse and/or has respiratory depression (respiratory rate of 10 or less), stop any opiates that are currently infusing and call a Rapid Response.    oxyCODONE immediate-release **OR** oxyCODONE immediate-release **OR** morphine injection    senna-docusate **AND** polyethylene glycol/lytes    labetalol    ondansetron    ondansetron    montelukast    Labs:  Recent Labs     01/04/25 0032 01/05/25 0132 01/06/25  0245   WBC 16.7* 15.3* 16.5*   RBC 3.60* 3.55* 3.88*   HEMOGLOBIN 10.8* 11.1* 11.8*   HEMATOCRIT 33.7* 33.6* 36.4*   MCV 93.6 94.6 93.8   MCH 30.0 31.3 30.4   RDW 42.5 43.1 42.8   PLATELETCT 398 443 519*   MPV 8.6* 8.4* 8.3*     Recent Labs     01/04/25  0032 01/05/25  0132 01/06/25  0245   SODIUM 138 138 138   POTASSIUM 3.8 3.8 4.2   CHLORIDE 103 102 103   CO2 26 26 25   GLUCOSE 113* 104* 101*   BUN 18 14 14     Recent Labs     01/04/25 0032 01/05/25 0132 01/06/25  0245   ALBUMIN 3.0* 3.1* 3.2   TBILIRUBIN 0.3 0.2 0.3   ALKPHOSPHAT 135* 132* 142*   TOTPROTEIN 5.7* 5.8* 6.1   ALTSGPT 45 42 43   ASTSGOT 22 23 32   CREATININE 0.62 0.70 0.74       Imaging:  CT-DRAIN-LIVER ABSCESS-CYST    Result Date: 12/29/2024 12/29/2024 11:48 AM HISTORY/REASON FOR EXAM:  SENT FROM URGENT CARE, FEVER, URINARY FREQUENCY TECHNIQUE/EXAM DESCRIPTION: Hepatic abscess drainage with CT guidance. Low dose optimization technique was utilized for this  CT exam including automated exposure control and adjustment of the mA and/or kV according to patient size. PROCEDURE: Informed consent was obtained. Moderate sedation with Fentanyl and Versed was administered during the procedure with appropriate continuous patient monitoring by the radiology nurse. Intraservice duration: 20 minutes Localizing CT images were obtained with the patient in supine position. The skin was prepped with ChloraPrep and draped in a sterile fashion. Following local anesthesia with 1% Lidocaine, a 17 G guiding needle was placed and needle path confirmed with CT. An Amplatz guidewire was placed and following serial tract dilatation, a 12 Israeli pigtail locking catheter was placed. A specimen was collected and submitted for culture and sensitivity and Gram stain. Total of 40 mL bloody purulent fluid was drained. The catheter was secured to the skin and connected to suction bulb drainage. Final CT images were obtained documenting catheter position. The patient tolerated the procedure well with no evidence of complication. COMPARISON: None available. FINDINGS: The final CT images show satisfactory catheter position within the target collection.     1.  CT guided placement of a percutaneous drainage catheter in a right hepatic abscess. 2.  The current plan is to obtain a follow-up CT in 5-7 days..    CT-ABDOMEN-PELVIS WITH    Addendum Date: 12/28/2024    These findings were discussed with JAYDON BEYER on 12/28/2024 9:12 PM.    Result Date: 12/28/2024 12/28/2024 8:39 PM HISTORY/REASON FOR EXAM:  Fever, history of lymphoma and adenopathy.  COMPARISON: No prior studies available. FINDINGS: Lower Chest: Minimal dependent atelectasis. Liver: Heterogeneous irregular low-attenuation structure in the superior RIGHT lobe liver measuring 5.1 x 5.7 x 4.5 cm. Spleen: Enlarged measuring 14.4 cm. Pancreas: Unremarkable. Gallbladder: No calcified stones. Biliary: Nondilated. Adrenal glands: Normal.  Kidneys: Small nonobstructing LEFT kidney stone.  RIGHT kidney shows mildly prominent collecting system and proximal ureter without calcified stone demonstrated.. Bowel: No bowel obstruction or focal bowel wall thickening. Lymph nodes: Multiple mildly enlarged  debora hepatis and portacaval lymph nodes measuring up to 16 mm short axis.  Mildly prominent pericaval lymph nodes. Vasculature: Unremarkable. Peritoneum: Unremarkable without ascites. Musculoskeletal: Degenerative disc disease at L5-S1 with grade 2 anterolisthesis.  Probable L5 pars defects.  LEFT hip prosthesis. Pelvis: Bladder is unremarkable.  No dependent fluid.     1.  Large irregular heterogeneous low-attenuation lesion in the RIGHT lobe liver superiorly measuring 5.6 cm, most suggestive of abscess. 2.  Prominent pericaval, portacaval and debora hepatis lymph nodes, inflammatory versus neoplastic. 3.  Mild RIGHT hydronephrosis without obstructing ureteral stone demonstrated. 4.  Small nonobstructing LEFT kidney stone.    DX-CHEST-PORTABLE (1 VIEW)    Result Date: 12/28/2024 12/28/2024 7:57 PM HISTORY/REASON FOR EXAM:  Sepsis; sepsis. Fever. TECHNIQUE/EXAM DESCRIPTION AND NUMBER OF VIEWS: Single portable view of the chest. COMPARISON: None FINDINGS: Cardiomediastinal contour is within normal limits. No focal pulmonary consolidation. Probable nipple shadow of the RIGHT lung base. No pleural fluid collection or pneumothorax. No major bony abnormality is seen.     1.  No acute cardiopulmonary disease. 2.  Probable RIGHT lung base Nipple shadows.  Consider confirmation with nonemergent followup chest x-ray with nipple markers and shallow oblique views.       Micro:  Results       Procedure Component Value Units Date/Time    BLOOD CULTURE [[id number]] Collected: 01/01/25 1612    Order Status: Completed Specimen: Blood from Peripheral Updated: 01/01/25 2008     Significant Indicator NEG     Source BLD     Site Peripheral     Culture Result No Growth  Note:  Blood cultures are incubated for 5 days and  are monitored continuously.Positive blood cultures  are called to the RN and reported as soon as  they are identified.      BLOOD CULTURE [[id number]] Collected: 01/01/25 1612    Order Status: Completed Specimen: Blood from Peripheral Updated: 01/01/25 2008     Significant Indicator NEG     Source BLD     Site Peripheral     Culture Result No Growth  Note: Blood cultures are incubated for 5 days and  are monitored continuously.Positive blood cultures  are called to the RN and reported as soon as  they are identified.      Blood Culture - Draw one from central line and one from peripheral site [[id number]]  (Abnormal) Collected: 12/28/24 1942    Order Status: Completed Specimen: Blood from Line Updated: 01/01/25 0719     Significant Indicator POS     Source BLD     Site Peripheral     Culture Result Growth detected by automated blood culture system.  12/29/2024  22:49        Streptococcus intermedius  See previous culture for sensitivity report.      E-Test [[id number]] Collected: 12/28/24 1942    Order Status: Completed Specimen: Other Updated: 01/01/25 0719     ETEST Sensitivity FINAL    Narrative:      61 tel. [id number] 12/29/2024, 18:25, RB PERF. RESULTS CALLED TO:[id number]    Blood Culture - Draw one from central line and one from peripheral site [[id number]]  (Abnormal)  (Susceptibility) Collected: 12/28/24 1942    Order Status: Completed Specimen: Blood from Peripheral Updated: 01/01/25 0719     Significant Indicator POS     Source BLD     Site PERIPHERAL     Culture Result Growth detected by automated blood culture system. 12/29/2024  18:22        Streptococcus intermedius    Susceptibility       Streptococcus intermedius (1)       Antibiotic Interpretation Microscan   Method Status    Cefotaxime Sensitive 0.094 mcg/mL E-TEST Final                       CoV-2, Flu A/B, And RSV by PCR ([employer] [facility]) [[id number]] Collected: 12/31/24 1616    Order Status: Completed Specimen:  Respirate from Nasopharyngeal Updated: 12/31/24 1718     Influenza virus A RNA Negative     Influenza virus B, PCR Negative     RSV, PCR Negative     SARS-CoV-2 by PCR NotDetected     Comment: [facility] providers: PLEASE REFER TO DE-ESCALATION AND RETESTING PROTOCOL  on [email or web address]    **The [employer] GeneXpert Xpress SARS-CoV-2 RT-PCR Test has been made  available for use under the Emergency Use Authorization (EUA) only.          SARS-CoV-2 Source NP Swab    CULTURE WOUND W/ GRAM STAIN [[id number]]  (Abnormal) Collected: 12/29/24 1215    Order Status: Completed Specimen: Wound Updated: 12/31/24 0942     Significant Indicator POS     Source WND     Site Liver abscess     Culture Result -     Gram Stain Result Many WBCs.  Many Gram positive cocci in chains.       Culture Result Streptococcus intermedius  Heavy growth              Assessment/Hospital course:  This is a 69-year-old male patient with history of non-Hodgkin's lymphoma currently on surveillance, history of nephrolithiasis, hyperlipidemia, presented with fevers and chills with a CT scan consistent with a 5.6 cm right liver abscess.  Patient underwent CT-guided drainage on 12/29, repeat CT scan with no improvement so drain exchange performed by IR on 1/3.  Blood cultures x 2 from 12/28 and IR drain cultures positive for Strep intermedius.  Patient did have dental work done, 3 crowns, before Thanksgiving.  No dental abscesses, no current toothache    Pertinent Diagnoses:  Strep intermedius bacteremia  Liver abscess  Non-Hodgkin's lymphoma, currently on surveillance     PLAN:   -Continue IV Unasyn 3 g every 6 hours, decreased risk of C. Difficile  -Follow repeat blood cultures x 2 from 1/1/2025, no growth till date  -Repeat CT scan anticipated ~1/8, or sooner if low output  -TTE with no obvious valvular vegetations, pulmonic valve not well-visualized, tricuspid valve with some myxomatous changes with moderate TR.  If persistently bacteremic, may need to  consider CASANDRA    Disposition: Anticipate no ID specific disposition needs  Need for PICC line: No    Discussed with Dr. Griffin.  ID will follow.  This illness poses a threat to life.

## 2025-01-06 NOTE — DISCHARGE PLANNING
[facility] TCN chart review completed. Collaborated with AMERICA Foreman. Per review, noted patient followed by ID and IR noting plans for repeat CT abdomen on 1/8.     AMERICA advised of no new TCN needs in discharge planning at this time as choice proactively obtained by TCN for HH and home infusion if needed in discharge planning. Per review of kardex, patient remains ambulatory without use of AD.     TCN will continue to follow and collaborate with discharge planning team as additional post acute needs arise. Thank you.    Completed:  Choice obtained: HH (1. [facility], 2. [facility]) & Infusion ([facility]).   Pt aware of [facility]'s blanket referral policy  [facility]P with [facility] PCP. Referral sent to assist in rescheduling primary care follow up

## 2025-01-06 NOTE — CARE PLAN
The patient is Stable - Low risk of patient condition declining or worsening    Shift Goals  Clinical Goals: Decline in WBC on ABX therapy during shift  Patient Goals: rest, see reduction in wbc count  Family Goals:  present, wants to see improvement from ABX therapy with reduction in WBC    Progress made toward(s) clinical / shift goals:      Patient reports 0/10 pain. He is up to self to the bathroom, and mobilized self to hallway. He is hoping the antibiotic regimen will decrease his WBC.

## 2025-01-06 NOTE — PROGRESS NOTES
[facility] Medicine Daily Progress Note    Date of Service  1/6/2025    Chief Complaint  Jet Webb is a 69 y.o. male admitted 12/28/2024 with fever and chills.    Hospital Course  Jet Webb is a 69-year-old male with PMHx non-Hodgkin's lymphoma, nephrolithiasis, HLD.  Admitted 12/28 for right sided liver abscesses.    Initially, patient presenting with fever, chills. CT of the abdomen pelvis with contrast: Right liver lobe lesion 5.6 cm most suggestive of abscess. Prominent pericaval, portacaval and debora hepatis lymph nodes. Mild right hydronephrosis without obstructing ureteral stones demonstrated.     Patient was started on IV Zosyn.  IR was consulted.  Patient underwent CT-guided drainage on 12/29.    Interval Problem Update  12/30: Vitals notable for Tmax 100.7.  Intermittent tachycardia.  SBP ranging 106 through 159.  DANISHA drain placed yesterday.  Wound cultures pending.    12/31: Patient remains afebrile.   through 144.  Resting comfortably on room air.  WBC 16.5 from 20.8.  Hb stable at 11.1.  LFTs downtrending.  Wound cultures positive for strep intermedius.  Discontinue IV Zosyn.  Start IV Unasyn.  Plan to repeat CT A/P on 1/3. Discussed with JUAN Quintana with IR at bedside. Plan to continue IV antibiotics and repeat CT A/P on 1/3.     1/1: Tmax 100.8 overnight.  COVID, flu, RSV negative.  WBC 17 from 16.5.  Hb stable at 11.8.  LFTs downtrending.  Repeat blood cultures ordered for today.  Planning for CT scan 1/3.    1/2: Tmax 101.4 overnight.  WBC 18.6 from 17.  Hb stable at 11.3.  Potassium 3.5; replaced.  LFTs continue to downtrend.  Repeat CT abdomen showing improvement in abscesses with drain in appropriate placement.  No plans for adjustment this time.  Continue IV Unasyn today.  Follow repeat blood cultures.  Repeat CBC in a.m.    1/3: Afebrile overnight.  WBC 20.4 from 18.6.  Discussed with Kamryn Sung, ABDIRAHMAN and Dr. Russo, infectious disease.  N.p.o. for  percutaneous drain exchange today with IR.    1/4: Afebrile overnight.  SBP ranging 118-142.  WBC 16.7 from 20.4.  Echocardiogram is pending to ensure no vegetations.  Blood cultures remain negative.  Continue IV Unasyn today.  Anticipate discharge early next week with IV antibiotics and close outpatient follow-up.    1/5: Afebrile overnight.   through 142.  WBC 15.3 from 16.7.  Hb stable at 11.1.  Continue IV Unasyn.  Plan on repeat CT abdomen on 1/8 or sooner if output continues to decrease. Echo pending for today.     1/6: Vitals stable overnight.  WBC 16.5 from 15.3.  Echo completed yesterday without obvious vegetations.  DANISHA drain with 20 mL output documented. Discussed with IR and infectious disease. Plan to repeat CT Abd on 1/8. Further management based on imaging.    I have discussed this patient's plan of care and discharge plan at IDT rounds today with Case Management, Nursing, Nursing leadership, and other members of the IDT team.    Consultants/Specialty  infectious disease and IR    Code Status  Full Code    Disposition  The patient is not medically cleared for discharge to home or a post-acute facility.  Anticipate discharge to: home with close outpatient follow-up    I have placed the appropriate orders for post-discharge needs.    Review of Systems  Review of Systems   Constitutional:  Negative for fever and malaise/fatigue.   Respiratory:  Negative for shortness of breath.    Cardiovascular:  Negative for chest pain and leg swelling.   Gastrointestinal:  Negative for abdominal pain, nausea and vomiting.        Physical Exam  Temp:  [36.2 °C (97.2 °F)-37.3 °C (99.1 °F)] 36.2 °C (97.2 °F)  Pulse:  [60-76] 60  Resp:  [16-18] 16  BP: (113-135)/(68-71) 117/68  SpO2:  [94 %-96 %] 95 %    Physical Exam  Vitals and nursing note reviewed.   Constitutional:       General: He is not in acute distress.     Appearance: Normal appearance. He is not ill-appearing.   Cardiovascular:      Rate and Rhythm:  Normal rate and regular rhythm.   Pulmonary:      Effort: Pulmonary effort is normal.      Breath sounds: Normal breath sounds.   Abdominal:      Comments: DANISHA drain to epigastric region.  Serosanguineous drainage present   Skin:     General: Skin is warm and dry.   Neurological:      Mental Status: He is alert and oriented to person, place, and time. Mental status is at baseline.   Psychiatric:         Mood and Affect: Mood normal.         Behavior: Behavior normal.         Fluids    Intake/Output Summary (Last 24 hours) at 1/6/2025 1421  Last data filed at 1/6/2025 1130  Gross per 24 hour   Intake 420 ml   Output 670 ml   Net -250 ml        Laboratory  Recent Labs     01/04/25  0032 01/05/25  0132 01/06/25  0245   WBC 16.7* 15.3* 16.5*   RBC 3.60* 3.55* 3.88*   HEMOGLOBIN 10.8* 11.1* 11.8*   HEMATOCRIT 33.7* 33.6* 36.4*   MCV 93.6 94.6 93.8   MCH 30.0 31.3 30.4   MCHC 32.0* 33.0 32.4   RDW 42.5 43.1 42.8   PLATELETCT 398 443 519*   MPV 8.6* 8.4* 8.3*     Recent Labs     01/04/25  0032 01/05/25  0132 01/06/25  0245   SODIUM 138 138 138   POTASSIUM 3.8 3.8 4.2   CHLORIDE 103 102 103   CO2 26 26 25   GLUCOSE 113* 104* 101*   BUN 18 14 14   CREATININE 0.62 0.70 0.74   CALCIUM 8.2* 8.3* 9.1                     Imaging  EC-ECHOCARDIOGRAM COMPLETE W/O CONT   Final Result      IR-DRAINAGE-CATH EXCHANGE   Final Result      1.  Resolving right hepatic abscess.      2.  Successful locking loop catheter exchange.      CT-ABDOMEN WITH   Final Result         1. There are new small effusions and compressive atelectasis, right worse than left.   2. There has been interval percutaneous drainage of the hepatic abscess.   3. Small left renal stone.   4. Mild hepatosplenomegaly.   5. Diverticulosis.   6. 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               CT-DRAIN-LIVER ABSCESS-CYST   Final Result      1.  CT guided placement of a percutaneous drainage catheter in a right hepatic abscess.   2.  The current plan is to  obtain a follow-up CT in 5-7 days..      CT-ABDOMEN-PELVIS WITH   Final Result   Addendum (preliminary) 1 of 1   These findings were discussed with JAYDON BEYER on 12/28/2024    9:12 PM.      Final      1.  Large irregular heterogeneous low-attenuation lesion in the RIGHT lobe liver superiorly measuring 5.6 cm, most suggestive of abscess.   2.  Prominent pericaval, portacaval and debora hepatis lymph nodes, inflammatory versus neoplastic.   3.  Mild RIGHT hydronephrosis without obstructing ureteral stone demonstrated.   4.  Small nonobstructing LEFT kidney stone.      DX-CHEST-PORTABLE (1 VIEW)   Final Result      1.  No acute cardiopulmonary disease.   2.  Probable RIGHT lung base Nipple shadows.  Consider confirmation with nonemergent followup chest x-ray with nipple markers and shallow oblique views.              Assessment/Plan  * Liver abscess- (present on admission)  Assessment & Plan  CT of the abdomen pelvis with contrast: Right liver lobe lesion 5.6 cm most suggestive of abscess.  S/p drain placement 12/29  Cultures growing strep intermedius  Repeat CT abdomen 1/2: Reidentified is an area of ill-defined low-attenuation in the liver centrally. This measures 7.2 x 6.5 x 5.7 cm. There is a pigtail drainage catheter in this region on the current exam along with some new gas.  IR DANISHA drain exchange 1/3  WBC increased to 16.5 overnight  - Continue IV Unasyn  - ID following  - Repeat CMP and CBC in a.m.  - Continue drain management per IR recommendations  - Repeat CT abdomen with on 1/8; pending those results plan for either drain exchange or hepatobiliary surgery consult    Bacteremia  Assessment & Plan  Blood cultures positive for strep intermedius 12/28  Repeat blood cultures 1/1 NGTD  Echocardiogram without obvious vegetations  - Infectious diseases following  - Continue IV Unasyn    ACP (advance care planning)  Assessment & Plan  Patient admitted with liver abscess.  History of non-Hodgkin lymphoma.   Age of 69.  I discussed the CODE STATUS with him and his  at bedside, including CPR, intubation/mechanical ventilation.  He wants to stay full code if there is no terminal diagnosis or mental inability.  Continue full code.  ACP 16 minutes.  Above per previous hospitalist     Elevated LFTs  Assessment & Plan  LFTs improving  Presumed secondary to liver abscess  Hepatitis panel negative  - Repeat CMP in a.m.    Hydronephrosis of right kidney  Assessment & Plan  Incidental finding on CT of the abdomen with contrast: Mild right hydronephrosis without obstructing ureteral stone.    Dyslipidemia- (present on admission)  Assessment & Plan  Will hold Lipitor due to elevated LFTs    Non-Hodgkin's lymphoma (HCC)- (present on admission)  Assessment & Plan  In remission for 4-5 years   per oncology note is low-grade, not on active treatments.    Continue  surveillance per Dr. Chen         VTE prophylaxis:   SCDs/TEDs      I have performed a physical exam and reviewed and updated ROS and Plan today (1/6/2025). In review of yesterday's note (1/5/2025), there are no changes except as documented above.

## 2025-01-06 NOTE — CARE PLAN
Problem: Knowledge Deficit - Standard  Goal: Patient and family/care givers will demonstrate understanding of plan of care, disease process/condition, diagnostic tests and medications  Outcome: Progressing     Problem: Pain - Standard  Goal: Alleviation of pain or a reduction in pain to the patient’s comfort goal  Outcome: Progressing   The patient is Stable - Low risk of patient condition declining or worsening    Shift Goals  Clinical Goals: IV ABX therapy  Patient Goals: Rest comfortably  Family Goals: no family present    Progress made toward(s) clinical / shift goals:  patient continue with IV ABX therapy, tolerating well.     Patient is not progressing towards the following goals:

## 2025-01-07 ENCOUNTER — APPOINTMENT (OUTPATIENT)
Dept: RADIOLOGY | Facility: MEDICAL CENTER | Age: 70
DRG: 442 | End: 2025-01-07
Attending: STUDENT IN AN ORGANIZED HEALTH CARE EDUCATION/TRAINING PROGRAM
Payer: MEDICARE

## 2025-01-07 LAB
ERYTHROCYTE [DISTWIDTH] IN BLOOD BY AUTOMATED COUNT: 42.6 FL (ref 35.9–50)
HCT VFR BLD AUTO: 35.5 % (ref 42–52)
HGB BLD-MCNC: 11.3 G/DL (ref 14–18)
MCH RBC QN AUTO: 30.1 PG (ref 27–33)
MCHC RBC AUTO-ENTMCNC: 31.8 G/DL (ref 32.3–36.5)
MCV RBC AUTO: 94.7 FL (ref 81.4–97.8)
PLATELET # BLD AUTO: 522 K/UL (ref 164–446)
PMV BLD AUTO: 8.2 FL (ref 9–12.9)
RBC # BLD AUTO: 3.75 M/UL (ref 4.7–6.1)
WBC # BLD AUTO: 17.6 K/UL (ref 4.8–10.8)

## 2025-01-07 PROCEDURE — 770006 HCHG ROOM/CARE - MED/SURG/GYN SEMI*

## 2025-01-07 PROCEDURE — A9270 NON-COVERED ITEM OR SERVICE: HCPCS | Performed by: INTERNAL MEDICINE

## 2025-01-07 PROCEDURE — 85027 COMPLETE CBC AUTOMATED: CPT

## 2025-01-07 PROCEDURE — 700111 HCHG RX REV CODE 636 W/ 250 OVERRIDE (IP): Mod: JZ | Performed by: INTERNAL MEDICINE

## 2025-01-07 PROCEDURE — 700102 HCHG RX REV CODE 250 W/ 637 OVERRIDE(OP): Performed by: INTERNAL MEDICINE

## 2025-01-07 PROCEDURE — 700101 HCHG RX REV CODE 250: Performed by: NURSE PRACTITIONER

## 2025-01-07 PROCEDURE — 99233 SBSQ HOSP IP/OBS HIGH 50: CPT | Performed by: STUDENT IN AN ORGANIZED HEALTH CARE EDUCATION/TRAINING PROGRAM

## 2025-01-07 PROCEDURE — 700105 HCHG RX REV CODE 258: Performed by: INTERNAL MEDICINE

## 2025-01-07 PROCEDURE — 74177 CT ABD & PELVIS W/CONTRAST: CPT

## 2025-01-07 PROCEDURE — 36415 COLL VENOUS BLD VENIPUNCTURE: CPT

## 2025-01-07 PROCEDURE — 700117 HCHG RX CONTRAST REV CODE 255: Performed by: STUDENT IN AN ORGANIZED HEALTH CARE EDUCATION/TRAINING PROGRAM

## 2025-01-07 RX ADMIN — AMPICILLIN AND SULBACTAM 3 G: 1; 2 INJECTION, POWDER, FOR SOLUTION INTRAMUSCULAR; INTRAVENOUS at 11:09

## 2025-01-07 RX ADMIN — AMPICILLIN AND SULBACTAM 3 G: 1; 2 INJECTION, POWDER, FOR SOLUTION INTRAMUSCULAR; INTRAVENOUS at 17:22

## 2025-01-07 RX ADMIN — ENOXAPARIN SODIUM 40 MG: 100 INJECTION SUBCUTANEOUS at 17:18

## 2025-01-07 RX ADMIN — SODIUM CHLORIDE, PRESERVATIVE FREE 10 ML: 5 INJECTION INTRAVENOUS at 17:18

## 2025-01-07 RX ADMIN — AMPICILLIN AND SULBACTAM 3 G: 1; 2 INJECTION, POWDER, FOR SOLUTION INTRAMUSCULAR; INTRAVENOUS at 23:38

## 2025-01-07 RX ADMIN — SODIUM CHLORIDE, PRESERVATIVE FREE 10 ML: 5 INJECTION INTRAVENOUS at 06:00

## 2025-01-07 RX ADMIN — AMPICILLIN AND SULBACTAM 3 G: 1; 2 INJECTION, POWDER, FOR SOLUTION INTRAMUSCULAR; INTRAVENOUS at 04:24

## 2025-01-07 RX ADMIN — IOHEXOL 80 ML: 350 INJECTION, SOLUTION INTRAVENOUS at 14:45

## 2025-01-07 RX ADMIN — MONTELUKAST 10 MG: 10 TABLET, FILM COATED ORAL at 17:18

## 2025-01-07 ASSESSMENT — ENCOUNTER SYMPTOMS
WEAKNESS: 0
SHORTNESS OF BREATH: 0
NAUSEA: 0
ABDOMINAL PAIN: 0
MYALGIAS: 0
COUGH: 0
FEVER: 0
VOMITING: 0
CHILLS: 0

## 2025-01-07 ASSESSMENT — PAIN DESCRIPTION - PAIN TYPE: TYPE: ACUTE PAIN

## 2025-01-07 NOTE — CARE PLAN
The patient is Stable - Low risk of patient condition declining or worsening    Shift Goals  Clinical Goals: Patient safety/IV abx  Patient Goals: rest  Family Goals: JOESPH    Progress made toward(s) clinical / shift goals:  Patient denies any pain, on IV abx, resting comfortably, call light within reach.     Patient is not progressing towards the following goals:

## 2025-01-07 NOTE — PROGRESS NOTES
Radiology Progress Note     Author: Danelle Wilson DNP Date & Time created: 1/7/2025  2:38 PM   Date of admission  12/28/2024  Note to reader: this note follows the APSO format rather than the historical SOAP format. Assessment and plan located at the top of the note for ease of use.    Chief Complaint  69 y.o. male admitted 12/28/2024 with   Chief Complaint   Patient presents with    Sent from Urgent Care      wbc 24, fever, chills. Called patient who requested antibiotics however at this time there is no clear source of infection. He has a history of non-Hodgkin's lymphoma and reports new lymphadenopathy. Further work up is needed as fevers ar on going with no known source of infection. Advised patient to go to  ED further evaluation; he verbalized understanding and intention to go to ED.    Fever     X 3 days now. Pt states taking advil for fever however it comes right back.     Urinary Frequency     Pt endorses urinary urgency  has gotten much worse the last 3/4 days        HPI  Jet Webb is a 69-year-old male with past medical history significant for non-Hodgkin's lymphoma, nephrolithiasis, and HLD admitted 12/28/2024 for CT finding of right hepatic fluid collection concerning for abscess after presenting to the ER with fevers and chills.  IR was consulted and patient underwent CT-guided liver abscess drain placement with IR Dr. Chandler on 12/29/2024    Interval History:   12/30/2024 -hepatic abscess 12F drain to RUQ with 140 mL slightly turbid serosanguinous output in the last 24 hours. Labs reviewed; WBC 20.8, H&H 11.5/33.9, , , alk phos 126, creatinine 0.84.  Hepatic fluid cultures pending. Drain flushed with 10 mL NS. I reviewed IDT notes and images.    12/31/24 - Hepatic abscess 12F drain to RUQ with 68 mL seropurulent output in the last 24 hours. Labs reviewed; WBC 16.5, H&H 11.1/33.0, AST 52, , alk phos 125, creatinine 0.73.  Hepatic fluid cultures preliminarily positive for  streptococcus intermedius. Drain flushed with 10 mL NS. I reviewed IDT notes and images and discussed patient with Dr. Griffin.     01/01/25 - Hepatic abscess 12F drain to RUQ with 50 mL serosanguinous output in the last 24 hours. Labs reviewed; WBC 17.0, H&H 11.8/35.8, AST 48, ALT 91, alk phos 149, creatinine 0.78.  Hepatic fluid cultures positive for streptococcus intermedius, on abx through ID. Drain flushed with 20 mL NS with 10 mLs able to be aspirated back. I reviewed IDT notes.     01/02/25 - Hepatic abscess 12F drain to RUQ with 27.5 mL serosanguinous output in the last 24 hours. Labs reviewed; WBC 18.6, H&H 11.3/34.0, AST 45, ALT 76, alk phos 161, creatinine 0.73.  Drain flushed with 20 mL NS with 10 mLs able to be aspirated back. I reviewed IDT notes and discussed POC with Dr Griffin.     01/03/25 - Hepatic abscess 12F drain to RUQ with 10 mL serosanguinous output in the last 24 hours. Labs reviewed; WBC 20.4, H&H 11.0/34.4, AST 23, ALT 54, alk phos 139, creatinine 0.66.  I reviewed IDT notes and discussed POC with Dr Griffin.     01/04/25 - Hepatic abscess 12F drain to RUQ with 1.5 mL serosanguinous output in the last 24 hours. Labs reviewed; WBC 16.7, H&H 10.8/33.7, AST 22, ALT 45, alk phos 135, creatinine 0.62.  I reviewed IDT notes, imaging from recent procedure, and discussed POC with Dr Griffin.     01/05/25 - Hepatic abscess 12F drain to RUQ with no output recorded in the last 24 hours. Labs reviewed; WBC 15.3, H&H 11.1/33.6, AST 23, ALT 42, alk phos 132, creatinine 0.62.  I reviewed IDT notes, and discussed POC with Dr Griffin and Dr Pickard, ID.    1/6/2025: RUQ hepatic abscess drain (12F) to bulb suction with 20 mL turbid tan fluid out in the last 24 hours.  Irrigated drain with 10 mL sterile saline with 10 mL turbid return.  Patient HAYDEN to PPTE, seated in bed, noting that he feels pretty good, is just bored.  Patient denies abdominal pain, change in discomfort with irrigation, nausea, or  vomiting.  I reviewed patient's most recent labs including WBC 16.5<15.3, Hgb 11.8, CR 0.74.  Coordinated post IR procedure care with patient, hospitalist, and hospital nursing staff.    1/7/2025: RUQ hepatic abscess drain (12F) to bulb suction with 20 mL serous fluid out in the last 24 hours.  Irrigated drain with 10 mL sterile saline with 4 mL of serous return.  Patient HAYDEN to [facility], seated in bed, awaiting pending CT.  He denies abdominal pain, nausea, or vomiting.  I reviewed patient's most recent labs including WC BC 17.6<16.5 (continuing to trend upward), Hgb 11.3, CR 0.74.  Coordinated post IR procedure care with patient, hospitalist, and hospital nursing staff.    Assessment/Plan     Principal Problem:    Liver abscess  Active Problems:    Non-Hodgkin's lymphoma (HCC)    Dyslipidemia    Hydronephrosis of right kidney    Elevated LFTs    ACP (advance care planning)    Bacteremia      Plan IR  - Pending CT (1/7/2025)  - Continue order to irrigate RUQ drain with 10 ml of sterile saline each shift  - Liver abscess fluid cultures positive for Streptococcus intermedius  - ID following   - Hepatic drain exchanged 01/03/25    - OK to discharge pt from an IR standpoint with drain in place and ID follow up if white count continues to improve and pt is medically cleared.   - Drain care education and flushing materials provided to patient  - Continue to monitor drains, VS, and labs      -Thank you for allowing Interventional Radiology team to participate in the patients care, if any additional care or requests are needed in the future please do not hesitate to call or place IR order           Review of Systems  Physical Exam   Review of Systems   Constitutional:  Negative for chills and fever.   Respiratory:  Negative for cough and shortness of breath.    Cardiovascular:  Negative for chest pain.   Gastrointestinal:  Negative for abdominal pain, nausea and vomiting.   Musculoskeletal:  Negative for myalgias.    Neurological:  Negative for weakness.      Vitals:    01/07/25 0719   BP: 120/75   Pulse: 60   Resp: 18   Temp: 36.4 °C (97.5 °F)   SpO2: 94%        Physical Exam  Vitals and nursing note reviewed.   Constitutional:       General: He is not in acute distress.     Appearance: He is not ill-appearing.   HENT:      Head: Atraumatic.   Cardiovascular:      Rate and Rhythm: Normal rate.      Pulses: Normal pulses.   Pulmonary:      Effort: Pulmonary effort is normal. No respiratory distress.   Abdominal:      General: There is no distension.      Tenderness: There is no abdominal tenderness.      Comments: IR drain to RUQ   Musculoskeletal:      Right lower leg: No edema.      Left lower leg: No edema.   Skin:     General: Skin is warm and dry.   Neurological:      General: No focal deficit present.      Mental Status: He is alert and oriented to person, place, and time.   Psychiatric:         Mood and Affect: Mood normal.         Behavior: Behavior normal.             Labs    Recent Labs     01/05/25 0132 01/06/25  0245 01/07/25  0024   WBC 15.3* 16.5* 17.6*   RBC 3.55* 3.88* 3.75*   HEMOGLOBIN 11.1* 11.8* 11.3*   HEMATOCRIT 33.6* 36.4* 35.5*   MCV 94.6 93.8 94.7   MCH 31.3 30.4 30.1   MCHC 33.0 32.4 31.8*   RDW 43.1 42.8 42.6   PLATELETCT 443 519* 522*   MPV 8.4* 8.3* 8.2*     Recent Labs     01/05/25 0132 01/06/25  0245   SODIUM 138 138   POTASSIUM 3.8 4.2   CHLORIDE 102 103   CO2 26 25   GLUCOSE 104* 101*   BUN 14 14   CREATININE 0.70 0.74   CALCIUM 8.3* 9.1     Recent Labs     01/05/25  0132 01/06/25  0245   ALBUMIN 3.1* 3.2   TBILIRUBIN 0.2 0.3   ALKPHOSPHAT 132* 142*   TOTPROTEIN 5.8* 6.1   ALTSGPT 42 43   ASTSGOT 23 32   CREATININE 0.70 0.74     EC-ECHOCARDIOGRAM COMPLETE W/O CONT   Final Result      IR-DRAINAGE-CATH EXCHANGE   Final Result      1.  Resolving right hepatic abscess.      2.  Successful locking loop catheter exchange.      CT-ABDOMEN WITH   Final Result         1. There are new small effusions  "and compressive atelectasis, right worse than left.   2. There has been interval percutaneous drainage of the hepatic abscess.   3. Small left renal stone.   4. Mild hepatosplenomegaly.   5. Diverticulosis.   6. 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               CT-DRAIN-LIVER ABSCESS-CYST   Final Result      1.  CT guided placement of a percutaneous drainage catheter in a right hepatic abscess.   2.  The current plan is to obtain a follow-up CT in 5-7 days..      CT-ABDOMEN-PELVIS WITH   Final Result   Addendum (preliminary) 1 of 1   These findings were discussed with JAYDON BEYER on 12/28/2024    9:12 PM.      Final      1.  Large irregular heterogeneous low-attenuation lesion in the RIGHT lobe liver superiorly measuring 5.6 cm, most suggestive of abscess.   2.  Prominent pericaval, portacaval and debora hepatis lymph nodes, inflammatory versus neoplastic.   3.  Mild RIGHT hydronephrosis without obstructing ureteral stone demonstrated.   4.  Small nonobstructing LEFT kidney stone.      DX-CHEST-PORTABLE (1 VIEW)   Final Result      1.  No acute cardiopulmonary disease.   2.  Probable RIGHT lung base Nipple shadows.  Consider confirmation with nonemergent followup chest x-ray with nipple markers and shallow oblique views.         CT-ABDOMEN-PELVIS WITH    (Results Pending)     INR   Date Value Ref Range Status   12/29/2024 1.37 (H) 0.87 - 1.13 Final     Comment:     INR - Non-therapeutic Reference Range: 0.87-1.13  INR - Therapeutic Reference Range: 2.0-4.0       No results found for: \"POCINR\"     Intake/Output Summary (Last 24 hours) at 12/30/2024 1036  Last data filed at 12/30/2024 0948  Gross per 24 hour   Intake 3080 ml   Output 1690 ml   Net 1390 ml      I have personally reviewed the above labs and imaging      I have performed a physical exam and reviewed and updated ROS and Plan today (1/7/2025).     38 minutes in directly providing and coordinating care and extensive data review.  " No time overlap and excludes procedures.

## 2025-01-07 NOTE — PROGRESS NOTES
Infectious Disease Progress Note    Author: Richard Pickard M.D. Date & Time of service: 2025  10:25 AM    Chief Complaint:   liver abscess  Strep bacteremia    Interval History:   69 y.o. male admitted 2024 for fever and chills due to above above  25 AF WBC 17 In the bathroom again today-no acute events overnight  25 AF WBC 18.6 Had CT done this am-results reviewed and plan of care discussed  1/3 AF WBC 20.4 planned for new drain today Up to chair-NAD   patient remains afebrile, count down to 16.7, underwent drain exchange 1/3, cultures as below, creatinine 0.62, normal transaminases, alk phos trending down   Tmax 99.1, white count up to 16.5, cultures as below, 20 cc recorded over the past 24 hours, normal transaminases   patient remains afebrile, count is up to 17.6, feels overall better with improved appetite, recorded output of 20 mL in the last 24 hours    Labs Reviewed, Medications Reviewed, and Radiology Reviewed.    Review of Systems:  Review of Systems   Constitutional:  Negative for fever.   Respiratory:  Negative for shortness of breath.    Gastrointestinal:  Negative for nausea and vomiting.   All other systems reviewed and are negative.      Hemodynamics:  Temp (24hrs), Av.6 °C (97.8 °F), Min:36.3 °C (97.3 °F), Max:36.8 °C (98.2 °F)  Temperature: 36.4 °C (97.5 °F)  Pulse  Av.1  Min: 60  Max: 103   Blood Pressure : 120/75       Physical Exam:  Physical Exam  Vitals and nursing note reviewed.   Constitutional:       General: He is not in acute distress.     Appearance: He is not ill-appearing, toxic-appearing or diaphoretic.   Eyes:      General: No scleral icterus.     Extraocular Movements: Extraocular movements intact.      Pupils: Pupils are equal, round, and reactive to light.   Cardiovascular:      Rate and Rhythm: Normal rate.   Pulmonary:      Effort: Pulmonary effort is normal. No respiratory distress.      Breath sounds: No stridor.   Abdominal:       General: There is no distension.      Comments: Drain serosanguinous, minimal   Skin:     Coloration: Skin is not jaundiced.   Neurological:      General: No focal deficit present.      Mental Status: He is alert and oriented to person, place, and time.         Meds:    Current Facility-Administered Medications:     ibuprofen    ampicillin-sulbactam (UNASYN) IV    normal saline flush    enoxaparin (LOVENOX) injection    acetaminophen    Notify provider if pain remains uncontrolled **AND** Use the Numeric Rating Scale (NRS), Walsh-Baker Faces (WBF), or FLACC on regular floors and Critical-Care Pain Observation Tool (CPOT) on ICUs/Trauma to assess pain **AND** Pulse Ox **AND** Pharmacy Consult Request **AND** If patient difficult to arouse and/or has respiratory depression (respiratory rate of 10 or less), stop any opiates that are currently infusing and call a Rapid Response.    oxyCODONE immediate-release **OR** oxyCODONE immediate-release **OR** morphine injection    senna-docusate **AND** polyethylene glycol/lytes    labetalol    ondansetron    ondansetron    montelukast    Labs:  Recent Labs     01/05/25 0132 01/06/25  0245 01/07/25  0024   WBC 15.3* 16.5* 17.6*   RBC 3.55* 3.88* 3.75*   HEMOGLOBIN 11.1* 11.8* 11.3*   HEMATOCRIT 33.6* 36.4* 35.5*   MCV 94.6 93.8 94.7   MCH 31.3 30.4 30.1   RDW 43.1 42.8 42.6   PLATELETCT 443 519* 522*   MPV 8.4* 8.3* 8.2*     Recent Labs     01/05/25 0132 01/06/25  0245   SODIUM 138 138   POTASSIUM 3.8 4.2   CHLORIDE 102 103   CO2 26 25   GLUCOSE 104* 101*   BUN 14 14     Recent Labs     01/05/25 0132 01/06/25  0245   ALBUMIN 3.1* 3.2   TBILIRUBIN 0.2 0.3   ALKPHOSPHAT 132* 142*   TOTPROTEIN 5.8* 6.1   ALTSGPT 42 43   ASTSGOT 23 32   CREATININE 0.70 0.74       Imaging:  CT-DRAIN-LIVER ABSCESS-CYST    Result Date: 12/29/2024 12/29/2024 11:48 AM HISTORY/REASON FOR EXAM:  SENT FROM URGENT CARE, FEVER, URINARY FREQUENCY TECHNIQUE/EXAM DESCRIPTION: Hepatic abscess drainage with CT  guidance. Low dose optimization technique was utilized for this CT exam including automated exposure control and adjustment of the mA and/or kV according to patient size. PROCEDURE: Informed consent was obtained. Moderate sedation with Fentanyl and Versed was administered during the procedure with appropriate continuous patient monitoring by the radiology nurse. Intraservice duration: 20 minutes Localizing CT images were obtained with the patient in supine position. The skin was prepped with ChloraPrep and draped in a sterile fashion. Following local anesthesia with 1% Lidocaine, a 17 G guiding needle was placed and needle path confirmed with CT. An Amplatz guidewire was placed and following serial tract dilatation, a 12 Latvian pigtail locking catheter was placed. A specimen was collected and submitted for culture and sensitivity and Gram stain. Total of 40 mL bloody purulent fluid was drained. The catheter was secured to the skin and connected to suction bulb drainage. Final CT images were obtained documenting catheter position. The patient tolerated the procedure well with no evidence of complication. COMPARISON: None available. FINDINGS: The final CT images show satisfactory catheter position within the target collection.     1.  CT guided placement of a percutaneous drainage catheter in a right hepatic abscess. 2.  The current plan is to obtain a follow-up CT in 5-7 days..    CT-ABDOMEN-PELVIS WITH    Addendum Date: 12/28/2024    These findings were discussed with JAYDON BEYER on 12/28/2024 9:12 PM.    Result Date: 12/28/2024 12/28/2024 8:39 PM HISTORY/REASON FOR EXAM:  Fever, history of lymphoma and adenopathy.  COMPARISON: No prior studies available. FINDINGS: Lower Chest: Minimal dependent atelectasis. Liver: Heterogeneous irregular low-attenuation structure in the superior RIGHT lobe liver measuring 5.1 x 5.7 x 4.5 cm. Spleen: Enlarged measuring 14.4 cm. Pancreas: Unremarkable. Gallbladder: No  calcified stones. Biliary: Nondilated. Adrenal glands: Normal. Kidneys: Small nonobstructing LEFT kidney stone.  RIGHT kidney shows mildly prominent collecting system and proximal ureter without calcified stone demonstrated.. Bowel: No bowel obstruction or focal bowel wall thickening. Lymph nodes: Multiple mildly enlarged  debora hepatis and portacaval lymph nodes measuring up to 16 mm short axis.  Mildly prominent pericaval lymph nodes. Vasculature: Unremarkable. Peritoneum: Unremarkable without ascites. Musculoskeletal: Degenerative disc disease at L5-S1 with grade 2 anterolisthesis.  Probable L5 pars defects.  LEFT hip prosthesis. Pelvis: Bladder is unremarkable.  No dependent fluid.     1.  Large irregular heterogeneous low-attenuation lesion in the RIGHT lobe liver superiorly measuring 5.6 cm, most suggestive of abscess. 2.  Prominent pericaval, portacaval and debora hepatis lymph nodes, inflammatory versus neoplastic. 3.  Mild RIGHT hydronephrosis without obstructing ureteral stone demonstrated. 4.  Small nonobstructing LEFT kidney stone.    DX-CHEST-PORTABLE (1 VIEW)    Result Date: 12/28/2024 12/28/2024 7:57 PM HISTORY/REASON FOR EXAM:  Sepsis; sepsis. Fever. TECHNIQUE/EXAM DESCRIPTION AND NUMBER OF VIEWS: Single portable view of the chest. COMPARISON: None FINDINGS: Cardiomediastinal contour is within normal limits. No focal pulmonary consolidation. Probable nipple shadow of the RIGHT lung base. No pleural fluid collection or pneumothorax. No major bony abnormality is seen.     1.  No acute cardiopulmonary disease. 2.  Probable RIGHT lung base Nipple shadows.  Consider confirmation with nonemergent followup chest x-ray with nipple markers and shallow oblique views.       Micro:  Results       Procedure Component Value Units Date/Time    BLOOD CULTURE [[id number]] Collected: 01/01/25 1612    Order Status: Completed Specimen: Blood from Peripheral Updated: 01/06/25 1900     Significant Indicator NEG     Source  BLD     Site Peripheral     Culture Result No growth after 5 days of incubation.    BLOOD CULTURE [[id number]] Collected: 01/01/25 1612    Order Status: Completed Specimen: Blood from Peripheral Updated: 01/06/25 1900     Significant Indicator NEG     Source BLD     Site Peripheral     Culture Result No growth after 5 days of incubation.    Blood Culture - Draw one from central line and one from peripheral site [[id number]]  (Abnormal) Collected: 12/28/24 1942    Order Status: Completed Specimen: Blood from Line Updated: 01/01/25 0719     Significant Indicator POS     Source BLD     Site Peripheral     Culture Result Growth detected by automated blood culture system.  12/29/2024  22:49        Streptococcus intermedius  See previous culture for sensitivity report.      E-Test [[id number]] Collected: 12/28/24 1942    Order Status: Completed Specimen: Other Updated: 01/01/25 0719     ETEST Sensitivity FINAL    Narrative:      61 tel. [id number] 12/29/2024, 18:25, RB PERF. RESULTS CALLED TO:[id number]    Blood Culture - Draw one from central line and one from peripheral site [[id number]]  (Abnormal)  (Susceptibility) Collected: 12/28/24 1942    Order Status: Completed Specimen: Blood from Peripheral Updated: 01/01/25 0719     Significant Indicator POS     Source BLD     Site PERIPHERAL     Culture Result Growth detected by automated blood culture system. 12/29/2024  18:22        Streptococcus intermedius    Susceptibility       Streptococcus intermedius (1)       Antibiotic Interpretation Microscan   Method Status    Cefotaxime Sensitive 0.094 mcg/mL E-TEST Final                       CoV-2, Flu A/B, And RSV by PCR (CepSolFocusid) [[id number]] Collected: 12/31/24 1616    Order Status: Completed Specimen: Respirate from Nasopharyngeal Updated: 12/31/24 1718     Influenza virus A RNA Negative     Influenza virus B, PCR Negative     RSV, PCR Negative     SARS-CoV-2 by PCR NotDetected     Comment: [facility] providers: PLEASE REFER TO  DE-ESCALATION AND RETESTING PROTOCOL  on [email or web address]    **The [employer]pi[employer] GeneXpert Xpress SARS-CoV-2 RT-PCR Test has been made  available for use under the Emergency Use Authorization (EUA) only.          SARS-CoV-2 Source NP Swab            Assessment/Hospital course:  This is a 69-year-old male patient with history of non-Hodgkin's lymphoma currently on surveillance, history of nephrolithiasis, hyperlipidemia, presented with fevers and chills with a CT scan consistent with a 5.6 cm right liver abscess.  Patient underwent CT-guided drainage on 12/29, repeat CT scan with no improvement so drain exchange performed by IR on 1/3.  Blood cultures x 2 from 12/28 and IR drain cultures positive for Strep intermedius.  Patient did have dental work done, 3 crowns, before Thanksgiving.  No dental abscesses, no current toothache    Pertinent Diagnoses:  Strep intermedius bacteremia  Liver abscess  Non-Hodgkin's lymphoma, currently on surveillance     PLAN:   -Continue IV Unasyn 3 g every 6 hours, decreased risk of C. Difficile  -Follow repeat blood cultures x 2 from 1/1/2025, no growth till date  -Recommend repeat CT scan   -TTE with no obvious valvular vegetations, pulmonic valve not well-visualized, tricuspid valve with some myxomatous changes with moderate TR.  If persistently bacteremic, may need to consider CASANDRA    Disposition: Anticipate no ID specific disposition needs  Need for PICC line: No    Discussed with Dr. Sales.  ID will follow.  This illness poses a threat to life.

## 2025-01-07 NOTE — DISCHARGE PLANNING
TCN following. HTH/SCP chart reviewed. Collaborated with KELLY Foreman. No new TCN needs identified. As noted patient is followed by ID and IR noting plans for repeat CT abdomen on 1/8.     CM advised of no new TCN needs in discharge planning at this time as choice proactively obtained by TCN for HH and home infusion if needed in discharge planning. Per review of kardex, patient remains ambulatory without use of AD.      TCN will continue to follow and collaborate with discharge planning team as additional post acute needs arise. Thank you.     Completed:  Choice obtained: HH (1. [facility], 2. [facility]) & Infusion ([facility]).   Pt aware of [facility]'s blanket referral policy  [facility]. Hospital follow up with primary care is now scheduled for 1/14

## 2025-01-07 NOTE — PROGRESS NOTES
Radiology Progress Note     Author: Danelle Wilson DNP Date & Time created: 1/6/2025  5:44 PM   Date of admission  12/28/2024  Note to reader: this note follows the APSO format rather than the historical SOAP format. Assessment and plan located at the top of the note for ease of use.    Chief Complaint  69 y.o. male admitted 12/28/2024 with   Chief Complaint   Patient presents with    Sent from Urgent Care      wbc 24, fever, chills. Called patient who requested antibiotics however at this time there is no clear source of infection. He has a history of non-Hodgkin's lymphoma and reports new lymphadenopathy. Further work up is needed as fevers ar on going with no known source of infection. Advised patient to go to  ED further evaluation; he verbalized understanding and intention to go to ED.    Fever     X 3 days now. Pt states taking advil for fever however it comes right back.     Urinary Frequency     Pt endorses urinary urgency  has gotten much worse the last 3/4 days        HPI  Jet Webb is a 69-year-old male with past medical history significant for non-Hodgkin's lymphoma, nephrolithiasis, and HLD admitted 12/28/2024 for CT finding of right hepatic fluid collection concerning for abscess after presenting to the ER with fevers and chills.  IR was consulted and patient underwent CT-guided liver abscess drain placement with IR Dr. Chandler on 12/29/2024    Interval History:   12/30/2024 -hepatic abscess 12F drain to RUQ with 140 mL slightly turbid serosanguinous output in the last 24 hours. Labs reviewed; WBC 20.8, H&H 11.5/33.9, , , alk phos 126, creatinine 0.84.  Hepatic fluid cultures pending. Drain flushed with 10 mL NS. I reviewed IDT notes and images.    12/31/24 - Hepatic abscess 12F drain to RUQ with 68 mL seropurulent output in the last 24 hours. Labs reviewed; WBC 16.5, H&H 11.1/33.0, AST 52, , alk phos 125, creatinine 0.73.  Hepatic fluid cultures preliminarily positive for  streptococcus intermedius. Drain flushed with 10 mL NS. I reviewed IDT notes and images and discussed patient with Dr. Griffin.     01/01/25 - Hepatic abscess 12F drain to RUQ with 50 mL serosanguinous output in the last 24 hours. Labs reviewed; WBC 17.0, H&H 11.8/35.8, AST 48, ALT 91, alk phos 149, creatinine 0.78.  Hepatic fluid cultures positive for streptococcus intermedius, on abx through ID. Drain flushed with 20 mL NS with 10 mLs able to be aspirated back. I reviewed IDT notes.     01/02/25 - Hepatic abscess 12F drain to RUQ with 27.5 mL serosanguinous output in the last 24 hours. Labs reviewed; WBC 18.6, H&H 11.3/34.0, AST 45, ALT 76, alk phos 161, creatinine 0.73.  Drain flushed with 20 mL NS with 10 mLs able to be aspirated back. I reviewed IDT notes and discussed POC with Dr Griffin.     01/03/25 - Hepatic abscess 12F drain to RUQ with 10 mL serosanguinous output in the last 24 hours. Labs reviewed; WBC 20.4, H&H 11.0/34.4, AST 23, ALT 54, alk phos 139, creatinine 0.66.  I reviewed IDT notes and discussed POC with Dr Griffin.     01/04/25 - Hepatic abscess 12F drain to RUQ with 1.5 mL serosanguinous output in the last 24 hours. Labs reviewed; WBC 16.7, H&H 10.8/33.7, AST 22, ALT 45, alk phos 135, creatinine 0.62.  I reviewed IDT notes, imaging from recent procedure, and discussed POC with Dr Griffin.     01/05/25 - Hepatic abscess 12F drain to RUQ with no output recorded in the last 24 hours. Labs reviewed; WBC 15.3, H&H 11.1/33.6, AST 23, ALT 42, alk phos 132, creatinine 0.62.  I reviewed IDT notes, and discussed POC with Dr Griffin and Dr Pickard, ID.    1/6/2025: RUQ hepatic abscess drain (12F) to bulb suction with 20 mL turbid tan fluid out in the last 24 hours.  Irrigated drain with 10 mL sterile saline with 10 mL turbid return.  Patient HAYDEN to PPTE, seated in bed, noting that he feels pretty good, is just bored.  Patient denies abdominal pain, change in discomfort with irrigation, nausea, or  vomiting.  I reviewed patient's most recent labs including WBC 16.5<15.3, Hgb 11.8, CR 0.74.  Coordinated post IR procedure care with patient, hospitalist, and hospital nursing staff.    Assessment/Plan     Principal Problem:    Liver abscess  Active Problems:    Non-Hodgkin's lymphoma (HCC)    Dyslipidemia    Hydronephrosis of right kidney    Elevated LFTs    ACP (advance care planning)    Bacteremia      Plan IR  - Continue order to irrigate RUQ drain with 10 ml of sterile saline each shift  - Liver abscess fluid cultures positive for Streptococcus intermedius  - ID following   - CT 01/02/25 shows slightly improved hepatic abscess with drain in correct place.   - Hepatic drain exchanged 01/03/25   - Repeat CT 01/08 if pt remains hospitalized.   - OK to discharge pt from an IR standpoint with drain in place and ID follow up if white count continues to improve and pt is medically cleared.   - Drain care education and flushing materials provided to patient  - Continue to monitor drains, VS, and labs      -Thank you for allowing Interventional Radiology team to participate in the patients care, if any additional care or requests are needed in the future please do not hesitate to call or place IR order           Review of Systems  Physical Exam   Review of Systems   Constitutional:  Negative for chills and fever.   Respiratory:  Negative for cough and shortness of breath.    Cardiovascular:  Negative for chest pain.   Gastrointestinal:  Negative for abdominal pain, nausea and vomiting.   Musculoskeletal:  Negative for myalgias.   Neurological:  Negative for weakness.      Vitals:    01/06/25 1552   BP: 132/72   Pulse: 70   Resp: 18   Temp: 36.8 °C (98.2 °F)   SpO2: 96%        Physical Exam  Vitals and nursing note reviewed.   Constitutional:       General: He is not in acute distress.     Appearance: He is not ill-appearing.   HENT:      Head: Atraumatic.   Cardiovascular:      Rate and Rhythm: Normal rate.       Pulses: Normal pulses.   Pulmonary:      Effort: Pulmonary effort is normal. No respiratory distress.   Abdominal:      General: There is no distension.      Tenderness: There is no abdominal tenderness.      Comments: IR drain to RUQ   Musculoskeletal:      Right lower leg: No edema.      Left lower leg: No edema.   Skin:     General: Skin is warm and dry.   Neurological:      General: No focal deficit present.      Mental Status: He is alert and oriented to person, place, and time.   Psychiatric:         Mood and Affect: Mood normal.         Behavior: Behavior normal.             Labs    Recent Labs     01/04/25  0032 01/05/25  0132 01/06/25  0245   WBC 16.7* 15.3* 16.5*   RBC 3.60* 3.55* 3.88*   HEMOGLOBIN 10.8* 11.1* 11.8*   HEMATOCRIT 33.7* 33.6* 36.4*   MCV 93.6 94.6 93.8   MCH 30.0 31.3 30.4   MCHC 32.0* 33.0 32.4   RDW 42.5 43.1 42.8   PLATELETCT 398 443 519*   MPV 8.6* 8.4* 8.3*     Recent Labs     01/04/25  0032 01/05/25  0132 01/06/25  0245   SODIUM 138 138 138   POTASSIUM 3.8 3.8 4.2   CHLORIDE 103 102 103   CO2 26 26 25   GLUCOSE 113* 104* 101*   BUN 18 14 14   CREATININE 0.62 0.70 0.74   CALCIUM 8.2* 8.3* 9.1     Recent Labs     01/04/25  0032 01/05/25  0132 01/06/25  0245   ALBUMIN 3.0* 3.1* 3.2   TBILIRUBIN 0.3 0.2 0.3   ALKPHOSPHAT 135* 132* 142*   TOTPROTEIN 5.7* 5.8* 6.1   ALTSGPT 45 42 43   ASTSGOT 22 23 32   CREATININE 0.62 0.70 0.74     EC-ECHOCARDIOGRAM COMPLETE W/O CONT   Final Result      IR-DRAINAGE-CATH EXCHANGE   Final Result      1.  Resolving right hepatic abscess.      2.  Successful locking loop catheter exchange.      CT-ABDOMEN WITH   Final Result         1. There are new small effusions and compressive atelectasis, right worse than left.   2. There has been interval percutaneous drainage of the hepatic abscess.   3. Small left renal stone.   4. Mild hepatosplenomegaly.   5. Diverticulosis.   6. 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               CT-DRAIN-LIVER  "ABSCESS-CYST   Final Result      1.  CT guided placement of a percutaneous drainage catheter in a right hepatic abscess.   2.  The current plan is to obtain a follow-up CT in 5-7 days..      CT-ABDOMEN-PELVIS WITH   Final Result   Addendum (preliminary) 1 of 1   These findings were discussed with JAYDON BEYER on 12/28/2024    9:12 PM.      Final      1.  Large irregular heterogeneous low-attenuation lesion in the RIGHT lobe liver superiorly measuring 5.6 cm, most suggestive of abscess.   2.  Prominent pericaval, portacaval and debora hepatis lymph nodes, inflammatory versus neoplastic.   3.  Mild RIGHT hydronephrosis without obstructing ureteral stone demonstrated.   4.  Small nonobstructing LEFT kidney stone.      DX-CHEST-PORTABLE (1 VIEW)   Final Result      1.  No acute cardiopulmonary disease.   2.  Probable RIGHT lung base Nipple shadows.  Consider confirmation with nonemergent followup chest x-ray with nipple markers and shallow oblique views.           INR   Date Value Ref Range Status   12/29/2024 1.37 (H) 0.87 - 1.13 Final     Comment:     INR - Non-therapeutic Reference Range: 0.87-1.13  INR - Therapeutic Reference Range: 2.0-4.0       No results found for: \"POCINR\"     Intake/Output Summary (Last 24 hours) at 12/30/2024 1036  Last data filed at 12/30/2024 0948  Gross per 24 hour   Intake 3080 ml   Output 1690 ml   Net 1390 ml      I have personally reviewed the above labs and imaging      I have performed a physical exam and reviewed and updated ROS and Plan today (1/6/2025).     35 minutes in directly providing and coordinating care and extensive data review.  No time overlap and excludes procedures.   "

## 2025-01-07 NOTE — CARE PLAN
The patient is Watcher - Medium risk of patient condition declining or worsening    Shift Goals  Clinical Goals: Continue iv abx  Patient Goals: rest  Family Goals: JOESPH    Progress made toward(s) clinical / shift goals:    Problem: Knowledge Deficit - Standard  Goal: Patient and family/care givers will demonstrate understanding of plan of care, disease process/condition, diagnostic tests and medications  Outcome: Progressing  Note: Pt aox4. Able to make needs known. Knowledgeable about current condition. Aware of plan of care. Remains on iv abx with no adverse reactions noted. Tolerating well. Vitals within normal limits. No complaints of pain or discomfort. Needs met. Pending ct      Problem: Fall Risk  Goal: Patient will remain free from falls  Outcome: Progressing  Note: Fall precautions in place. Bed placed in low and locked position. Non slip socks on. Call light within reach. Hourly rounding      Problem: Pain - Standard  Goal: Alleviation of pain or a reduction in pain to the patient’s comfort goal  Outcome: Progressing  Flowsheets  Taken 1/7/2025 1316  Non Verbal Scale:   Calm   Unlabored Breathing  Taken 1/7/2025 1300  Pain Rating Scale (NPRS): 0  Note: No complaints of pain or discomfort      Problem: Discharge Barriers/Planning  Goal: Patient's continuum of care needs are met  Outcome: Progressing  Flowsheets (Taken 1/7/2025 1316)  Continuum of Care Needs: Assessed for discharge barriers       Patient is not progressing towards the following goals:

## 2025-01-08 ENCOUNTER — APPOINTMENT (OUTPATIENT)
Dept: RADIOLOGY | Facility: MEDICAL CENTER | Age: 70
DRG: 442 | End: 2025-01-08
Payer: MEDICARE

## 2025-01-08 LAB
ALBUMIN SERPL BCP-MCNC: 3.1 G/DL (ref 3.2–4.9)
ALBUMIN/GLOB SERPL: 1.1 G/DL
ALP SERPL-CCNC: 126 U/L (ref 30–99)
ALT SERPL-CCNC: 34 U/L (ref 2–50)
ANION GAP SERPL CALC-SCNC: 9 MMOL/L (ref 7–16)
AST SERPL-CCNC: 22 U/L (ref 12–45)
BILIRUB SERPL-MCNC: 0.3 MG/DL (ref 0.1–1.5)
BUN SERPL-MCNC: 14 MG/DL (ref 8–22)
CALCIUM ALBUM COR SERPL-MCNC: 9.6 MG/DL (ref 8.5–10.5)
CALCIUM SERPL-MCNC: 8.9 MG/DL (ref 8.5–10.5)
CHLORIDE SERPL-SCNC: 102 MMOL/L (ref 96–112)
CO2 SERPL-SCNC: 27 MMOL/L (ref 20–33)
CREAT SERPL-MCNC: 0.75 MG/DL (ref 0.5–1.4)
ERYTHROCYTE [DISTWIDTH] IN BLOOD BY AUTOMATED COUNT: 43.1 FL (ref 35.9–50)
GFR SERPLBLD CREATININE-BSD FMLA CKD-EPI: 96 ML/MIN/1.73 M 2
GLOBULIN SER CALC-MCNC: 2.8 G/DL (ref 1.9–3.5)
GLUCOSE SERPL-MCNC: 97 MG/DL (ref 65–99)
HCT VFR BLD AUTO: 35.3 % (ref 42–52)
HGB BLD-MCNC: 11.3 G/DL (ref 14–18)
MAGNESIUM SERPL-MCNC: 2.2 MG/DL (ref 1.5–2.5)
MCH RBC QN AUTO: 30.7 PG (ref 27–33)
MCHC RBC AUTO-ENTMCNC: 32 G/DL (ref 32.3–36.5)
MCV RBC AUTO: 95.9 FL (ref 81.4–97.8)
PHOSPHATE SERPL-MCNC: 3.4 MG/DL (ref 2.5–4.5)
PLATELET # BLD AUTO: 529 K/UL (ref 164–446)
PMV BLD AUTO: 8.3 FL (ref 9–12.9)
POTASSIUM SERPL-SCNC: 3.7 MMOL/L (ref 3.6–5.5)
PROT SERPL-MCNC: 5.9 G/DL (ref 6–8.2)
RBC # BLD AUTO: 3.68 M/UL (ref 4.7–6.1)
SODIUM SERPL-SCNC: 138 MMOL/L (ref 135–145)
WBC # BLD AUTO: 16.5 K/UL (ref 4.8–10.8)

## 2025-01-08 PROCEDURE — 99233 SBSQ HOSP IP/OBS HIGH 50: CPT | Performed by: STUDENT IN AN ORGANIZED HEALTH CARE EDUCATION/TRAINING PROGRAM

## 2025-01-08 PROCEDURE — 85027 COMPLETE CBC AUTOMATED: CPT

## 2025-01-08 PROCEDURE — 700111 HCHG RX REV CODE 636 W/ 250 OVERRIDE (IP): Mod: JZ

## 2025-01-08 PROCEDURE — 80053 COMPREHEN METABOLIC PANEL: CPT

## 2025-01-08 PROCEDURE — 84100 ASSAY OF PHOSPHORUS: CPT

## 2025-01-08 PROCEDURE — A9270 NON-COVERED ITEM OR SERVICE: HCPCS | Performed by: INTERNAL MEDICINE

## 2025-01-08 PROCEDURE — 36415 COLL VENOUS BLD VENIPUNCTURE: CPT

## 2025-01-08 PROCEDURE — 700111 HCHG RX REV CODE 636 W/ 250 OVERRIDE (IP): Mod: JZ | Performed by: INTERNAL MEDICINE

## 2025-01-08 PROCEDURE — 83735 ASSAY OF MAGNESIUM: CPT

## 2025-01-08 PROCEDURE — 700111 HCHG RX REV CODE 636 W/ 250 OVERRIDE (IP): Mod: JZ | Performed by: RADIOLOGY

## 2025-01-08 PROCEDURE — 700102 HCHG RX REV CODE 250 W/ 637 OVERRIDE(OP): Performed by: INTERNAL MEDICINE

## 2025-01-08 PROCEDURE — 75984 XRAY CONTROL CATHETER CHANGE: CPT

## 2025-01-08 PROCEDURE — 700105 HCHG RX REV CODE 258: Performed by: INTERNAL MEDICINE

## 2025-01-08 PROCEDURE — 770006 HCHG ROOM/CARE - MED/SURG/GYN SEMI*

## 2025-01-08 PROCEDURE — 700117 HCHG RX CONTRAST REV CODE 255: Performed by: RADIOLOGY

## 2025-01-08 PROCEDURE — 0F20X0Z CHANGE DRAINAGE DEVICE IN LIVER, EXTERNAL APPROACH: ICD-10-PCS | Performed by: RADIOLOGY

## 2025-01-08 PROCEDURE — 700101 HCHG RX REV CODE 250: Performed by: NURSE PRACTITIONER

## 2025-01-08 RX ORDER — SODIUM CHLORIDE 9 MG/ML
500 INJECTION, SOLUTION INTRAVENOUS
Status: ACTIVE | OUTPATIENT
Start: 2025-01-08 | End: 2025-01-08

## 2025-01-08 RX ORDER — ONDANSETRON 2 MG/ML
4 INJECTION INTRAMUSCULAR; INTRAVENOUS PRN
Status: ACTIVE | OUTPATIENT
Start: 2025-01-08 | End: 2025-01-08

## 2025-01-08 RX ORDER — MIDAZOLAM HYDROCHLORIDE 1 MG/ML
INJECTION INTRAMUSCULAR; INTRAVENOUS
Status: COMPLETED
Start: 2025-01-08 | End: 2025-01-08

## 2025-01-08 RX ORDER — MIDAZOLAM HYDROCHLORIDE 1 MG/ML
.5-2 INJECTION INTRAMUSCULAR; INTRAVENOUS PRN
Status: ACTIVE | OUTPATIENT
Start: 2025-01-08 | End: 2025-01-08

## 2025-01-08 RX ADMIN — AMPICILLIN AND SULBACTAM 3 G: 1; 2 INJECTION, POWDER, FOR SOLUTION INTRAMUSCULAR; INTRAVENOUS at 12:13

## 2025-01-08 RX ADMIN — SODIUM CHLORIDE, PRESERVATIVE FREE 10 ML: 5 INJECTION INTRAVENOUS at 04:14

## 2025-01-08 RX ADMIN — MONTELUKAST 10 MG: 10 TABLET, FILM COATED ORAL at 17:38

## 2025-01-08 RX ADMIN — FENTANYL CITRATE 50 MCG: 50 INJECTION, SOLUTION INTRAMUSCULAR; INTRAVENOUS at 14:13

## 2025-01-08 RX ADMIN — SODIUM CHLORIDE, PRESERVATIVE FREE 10 ML: 5 INJECTION INTRAVENOUS at 17:41

## 2025-01-08 RX ADMIN — AMPICILLIN AND SULBACTAM 3 G: 1; 2 INJECTION, POWDER, FOR SOLUTION INTRAMUSCULAR; INTRAVENOUS at 04:14

## 2025-01-08 RX ADMIN — AMPICILLIN AND SULBACTAM 3 G: 1; 2 INJECTION, POWDER, FOR SOLUTION INTRAMUSCULAR; INTRAVENOUS at 17:41

## 2025-01-08 RX ADMIN — MIDAZOLAM HYDROCHLORIDE 1 MG: 1 INJECTION, SOLUTION INTRAMUSCULAR; INTRAVENOUS at 14:16

## 2025-01-08 RX ADMIN — MIDAZOLAM HYDROCHLORIDE 1 MG: 1 INJECTION, SOLUTION INTRAMUSCULAR; INTRAVENOUS at 14:13

## 2025-01-08 RX ADMIN — FENTANYL CITRATE 50 MCG: 50 INJECTION, SOLUTION INTRAMUSCULAR; INTRAVENOUS at 14:16

## 2025-01-08 RX ADMIN — AMPICILLIN AND SULBACTAM 3 G: 1; 2 INJECTION, POWDER, FOR SOLUTION INTRAMUSCULAR; INTRAVENOUS at 23:56

## 2025-01-08 RX ADMIN — IOHEXOL 10 ML: 300 INJECTION, SOLUTION INTRAVENOUS at 14:15

## 2025-01-08 RX ADMIN — ACETAMINOPHEN 650 MG: 325 TABLET ORAL at 21:45

## 2025-01-08 ASSESSMENT — PAIN DESCRIPTION - PAIN TYPE
TYPE: ACUTE PAIN

## 2025-01-08 ASSESSMENT — ENCOUNTER SYMPTOMS
NAUSEA: 0
VOMITING: 0
ABDOMINAL PAIN: 0
FEVER: 0
SHORTNESS OF BREATH: 0

## 2025-01-08 NOTE — PROGRESS NOTES
Infectious Disease Progress Note    Author: Richard Pickard M.D. Date & Time of service: 2025  9:22 AM    Chief Complaint:   liver abscess  Strep bacteremia    Interval History:   69 y.o. male admitted 2024 for fever and chills due to above above  25 AF WBC 17 In the bathroom again today-no acute events overnight  25 AF WBC 18.6 Had CT done this am-results reviewed and plan of care discussed  1/3 AF WBC 20.4 planned for new drain today Up to chair-NAD   patient remains afebrile, count down to 16.7, underwent drain exchange 1/3, cultures as below, creatinine 0.62, normal transaminases, alk phos trending down   Tmax 99.1, white count up to 16.5, cultures as below, 20 cc recorded over the past 24 hours, normal transaminases   patient remains afebrile, count is up to 17.6, feels overall better with improved appetite, recorded output of 20 mL in the last 24 hours   patient remains afebrile, white count was up to 16.5 and repeat CT with only mild improvement so drain upsize was performed by IR  and a white count down to 12, drain, creatinine 0.75, alk phos continuing to trend down, 126    Labs Reviewed, Medications Reviewed, and Radiology Reviewed.    Review of Systems:  Review of Systems   Constitutional:  Negative for fever.   Respiratory:  Negative for shortness of breath.    Gastrointestinal:  Negative for nausea and vomiting.   All other systems reviewed and are negative.      Hemodynamics:  Temp (24hrs), Av.4 °C (97.6 °F), Min:36.2 °C (97.2 °F), Max:36.8 °C (98.3 °F)  Temperature: 36.2 °C (97.2 °F)  Pulse  Av.4  Min: 60  Max: 103   Blood Pressure : 123/66       Physical Exam:  Physical Exam  Vitals and nursing note reviewed.   Constitutional:       General: He is not in acute distress.     Appearance: He is not ill-appearing, toxic-appearing or diaphoretic.   Eyes:      General: No scleral icterus.     Extraocular Movements: Extraocular movements intact.      Pupils: Pupils  are equal, round, and reactive to light.   Cardiovascular:      Rate and Rhythm: Normal rate.   Pulmonary:      Effort: Pulmonary effort is normal. No respiratory distress.      Breath sounds: No stridor.   Abdominal:      General: There is no distension.      Comments: Drain serosanguinous, minimal   Skin:     Coloration: Skin is not jaundiced.   Neurological:      General: No focal deficit present.      Mental Status: He is alert and oriented to person, place, and time.         Meds:    Current Facility-Administered Medications:     ibuprofen    ampicillin-sulbactam (UNASYN) IV    normal saline flush    enoxaparin (LOVENOX) injection    acetaminophen    Notify provider if pain remains uncontrolled **AND** Use the Numeric Rating Scale (NRS), Walsh-Baker Faces (WBF), or FLACC on regular floors and Critical-Care Pain Observation Tool (CPOT) on ICUs/Trauma to assess pain **AND** Pulse Ox **AND** Pharmacy Consult Request **AND** If patient difficult to arouse and/or has respiratory depression (respiratory rate of 10 or less), stop any opiates that are currently infusing and call a Rapid Response.    oxyCODONE immediate-release **OR** oxyCODONE immediate-release **OR** morphine injection    senna-docusate **AND** polyethylene glycol/lytes    labetalol    ondansetron    ondansetron    montelukast    Labs:  Recent Labs     01/06/25 0245 01/07/25  0024 01/08/25  0034   WBC 16.5* 17.6* 16.5*   RBC 3.88* 3.75* 3.68*   HEMOGLOBIN 11.8* 11.3* 11.3*   HEMATOCRIT 36.4* 35.5* 35.3*   MCV 93.8 94.7 95.9   MCH 30.4 30.1 30.7   RDW 42.8 42.6 43.1   PLATELETCT 519* 522* 529*   MPV 8.3* 8.2* 8.3*     Recent Labs     01/06/25 0245 01/08/25  0034   SODIUM 138 138   POTASSIUM 4.2 3.7   CHLORIDE 103 102   CO2 25 27   GLUCOSE 101* 97   BUN 14 14     Recent Labs     01/06/25 0245 01/08/25  0034   ALBUMIN 3.2 3.1*   TBILIRUBIN 0.3 0.3   ALKPHOSPHAT 142* 126*   TOTPROTEIN 6.1 5.9*   ALTSGPT 43 34   ASTSGOT 32 22   CREATININE 0.74 0.75        Imaging:  CT-DRAIN-LIVER ABSCESS-CYST    Result Date: 12/29/2024 12/29/2024 11:48 AM HISTORY/REASON FOR EXAM:  SENT FROM URGENT CARE, FEVER, URINARY FREQUENCY TECHNIQUE/EXAM DESCRIPTION: Hepatic abscess drainage with CT guidance. Low dose optimization technique was utilized for this CT exam including automated exposure control and adjustment of the mA and/or kV according to patient size. PROCEDURE: Informed consent was obtained. Moderate sedation with Fentanyl and Versed was administered during the procedure with appropriate continuous patient monitoring by the radiology nurse. Intraservice duration: 20 minutes Localizing CT images were obtained with the patient in supine position. The skin was prepped with ChloraPrep and draped in a sterile fashion. Following local anesthesia with 1% Lidocaine, a 17 G guiding needle was placed and needle path confirmed with CT. An Amplatz guidewire was placed and following serial tract dilatation, a 12 [place] pigtail locking catheter was placed. A specimen was collected and submitted for culture and sensitivity and Gram stain. Total of 40 mL bloody purulent fluid was drained. The catheter was secured to the skin and connected to suction bulb drainage. Final CT images were obtained documenting catheter position. The patient tolerated the procedure well with no evidence of complication. COMPARISON: None available. FINDINGS: The final CT images show satisfactory catheter position within the target collection.     1.  CT guided placement of a percutaneous drainage catheter in a right hepatic abscess. 2.  The current plan is to obtain a follow-up CT in 5-7 days..    CT-ABDOMEN-PELVIS WITH    Addendum Date: 12/28/2024    These findings were discussed with JAYDON BEYER on 12/28/2024 9:12 PM.    Result Date: 12/28/2024 12/28/2024 8:39 PM HISTORY/REASON FOR EXAM:  Fever, history of lymphoma and adenopathy.  COMPARISON: No prior studies available. FINDINGS: Lower Chest:  Minimal dependent atelectasis. Liver: Heterogeneous irregular low-attenuation structure in the superior RIGHT lobe liver measuring 5.1 x 5.7 x 4.5 cm. Spleen: Enlarged measuring 14.4 cm. Pancreas: Unremarkable. Gallbladder: No calcified stones. Biliary: Nondilated. Adrenal glands: Normal. Kidneys: Small nonobstructing LEFT kidney stone.  RIGHT kidney shows mildly prominent collecting system and proximal ureter without calcified stone demonstrated.. Bowel: No bowel obstruction or focal bowel wall thickening. Lymph nodes: Multiple mildly enlarged  debora hepatis and portacaval lymph nodes measuring up to 16 mm short axis.  Mildly prominent pericaval lymph nodes. Vasculature: Unremarkable. Peritoneum: Unremarkable without ascites. Musculoskeletal: Degenerative disc disease at L5-S1 with grade 2 anterolisthesis.  Probable L5 pars defects.  LEFT hip prosthesis. Pelvis: Bladder is unremarkable.  No dependent fluid.     1.  Large irregular heterogeneous low-attenuation lesion in the RIGHT lobe liver superiorly measuring 5.6 cm, most suggestive of abscess. 2.  Prominent pericaval, portacaval and debora hepatis lymph nodes, inflammatory versus neoplastic. 3.  Mild RIGHT hydronephrosis without obstructing ureteral stone demonstrated. 4.  Small nonobstructing LEFT kidney stone.    DX-CHEST-PORTABLE (1 VIEW)    Result Date: 12/28/2024 12/28/2024 7:57 PM HISTORY/REASON FOR EXAM:  Sepsis; sepsis. Fever. TECHNIQUE/EXAM DESCRIPTION AND NUMBER OF VIEWS: Single portable view of the chest. COMPARISON: None FINDINGS: Cardiomediastinal contour is within normal limits. No focal pulmonary consolidation. Probable nipple shadow of the RIGHT lung base. No pleural fluid collection or pneumothorax. No major bony abnormality is seen.     1.  No acute cardiopulmonary disease. 2.  Probable RIGHT lung base Nipple shadows.  Consider confirmation with nonemergent followup chest x-ray with nipple markers and shallow oblique views.        Micro:  Results       Procedure Component Value Units Date/Time    BLOOD CULTURE [[id number]] Collected: 01/01/25 1612    Order Status: Completed Specimen: Blood from Peripheral Updated: 01/06/25 1900     Significant Indicator NEG     Source BLD     Site Peripheral     Culture Result No growth after 5 days of incubation.    BLOOD CULTURE [[id number]] Collected: 01/01/25 1612    Order Status: Completed Specimen: Blood from Peripheral Updated: 01/06/25 1900     Significant Indicator NEG     Source BLD     Site Peripheral     Culture Result No growth after 5 days of incubation.            Assessment/Hospital course:  This is a 69-year-old male patient with history of non-Hodgkin's lymphoma currently on surveillance, history of nephrolithiasis, hyperlipidemia, presented with fevers and chills with a CT scan consistent with a 5.6 cm right liver abscess.  Patient underwent CT-guided drainage on 12/29, repeat CT scan with no improvement so drain exchange performed by IR on 1/3.  Blood cultures x 2 from 12/28 and IR drain cultures positive for Strep intermedius.  Patient did have dental work done, 3 crowns, before Thanksgiving.  No dental abscesses, no current toothache    Pertinent Diagnoses:  Strep intermedius bacteremia  Liver abscess  Non-Hodgkin's lymphoma, currently on surveillance     PLAN:   -Continue IV Unasyn 3 g every 6 hours  -Follow repeat blood cultures x 2 from 1/1/2025, no growth till date  -Repeat CT scan continue to demonstrate very slow improvement in size of abscess.  IR performed drain upsize 1/8, white count down to 12 today.  Anticipate that if white count stays low or lower tomorrow, will discharge with drain in place  -TTE with no obvious valvular vegetations, pulmonic valve not well-visualized, tricuspid valve with some myxomatous changes with moderate TR.  If persistently bacteremic, may need to consider CASANDRA    Disposition: If white count remains low tomorrow, anticipate discharging on about 6  to 8 weeks of p.o. Augmentin 875 mg twice daily.  Will repeat labs on Monday and repeat CT scan in 2 weeks (ordered)  Need for PICC line: No    Discussed with Dr. Sales and Danelle MARTINEZ.  ID will follow.  Patient at risk of ongoing morbidities from chronic use of broad-spectrum antibiotic.  ID clinic follow-up in 1 to 2 weeks with NP Ade    At today's visit, I engaged in complex medical decision making associated with antimicrobial prescribing.  The additions/changes made today were made in order to optimize treatment and minimize risk of adverse effects including C. difficile and risk of future resistance, taking into account resistance patterns and overall history including recent antibiotic exposure. Patient was counseled regarding the rationale and risks/benefits of these antibiotic decisions.

## 2025-01-08 NOTE — PROGRESS NOTES
IR Nursing Note      Abscessogram Through Existing Hepatic Drain, Possible Echange and/or Upsize by MD Chandler assisted by RT Samuel & BEN Mooney access site.    Access site with 24 Fr drained, secured with x1 suture, covered with gauze/medipore, C/D/I.  To bulb suction.    Report given to AGNES Naik.  Patient transported to [facility] via IR RN monitored then transferred care to report RN.      [employer], Flexima APDL, 14F x 25cm, REF# [id number], LOT# [id number], Exp. Date 11/18/2027

## 2025-01-08 NOTE — CARE PLAN
The patient is Watcher - Medium risk of patient condition declining or worsening    Shift Goals  Clinical Goals: absessogram today  Patient Goals: Rest  Family Goals: JOESPH    Progress made toward(s) clinical / shift goals:    Problem: Knowledge Deficit - Standard  Goal: Patient and family/care givers will demonstrate understanding of plan of care, disease process/condition, diagnostic tests and medications  Outcome: Progressing  Note: Pt alert and responsive. Able to make needs known. Scheduled meds given. No complaints of pain or discomfort. S/p abscessogram. Vitals are within normal limits. Drain appears intact and patent with bloody output. Post op vitals in place. Needs met. Call light within reach.      Problem: Fall Risk  Goal: Patient will remain free from falls  Outcome: Progressing     Problem: Pain - Standard  Goal: Alleviation of pain or a reduction in pain to the patient’s comfort goal  Outcome: Progressing  Flowsheets (Taken 1/8/2025 5760)  Non Verbal Scale:   Calm   Unlabored Breathing  OB Pain Intervention:   Rest   Repositioned  Pain Rating Scale (NPRS): 0       Patient is not progressing towards the following goals:

## 2025-01-08 NOTE — OR SURGEON
Immediate Post- Operative Note        Findings: Hepatic abscess, incompletely drained, increasing WBC.      Procedure(s): Abscessogram with drain exchange/upsize.       Estimated Blood Loss: Less than 5 ml        Complications: None            1/8/2025     1427 PM     Moshe Chandler M.D.

## 2025-01-08 NOTE — PROGRESS NOTES
[facility] Medicine Daily Progress Note    Date of Service  1/7/2025    Chief Complaint  Jet Webb is a 69 y.o. male admitted 12/28/2024 with fever and chills.    Hospital Course  Jet Webb is a 69-year-old male with PMHx non-Hodgkin's lymphoma, nephrolithiasis, HLD.  Admitted 12/28 for right sided liver abscesses.    Initially, patient presenting with fever, chills. CT of the abdomen pelvis with contrast: Right liver lobe lesion 5.6 cm most suggestive of abscess. Prominent pericaval, portacaval and debora hepatis lymph nodes. Mild right hydronephrosis without obstructing ureteral stones demonstrated.     Patient was started on IV Zosyn.  IR was consulted.  Patient underwent CT-guided drainage on 12/29.    Interval Problem Update  12/30: Vitals notable for Tmax 100.7.  Intermittent tachycardia.  SBP ranging 106 through 159.  DANISHA drain placed yesterday.  Wound cultures pending.    12/31: Patient remains afebrile.   through 144.  Resting comfortably on room air.  WBC 16.5 from 20.8.  Hb stable at 11.1.  LFTs downtrending.  Wound cultures positive for strep intermedius.  Discontinue IV Zosyn.  Start IV Unasyn.  Plan to repeat CT A/P on 1/3. Discussed with JUAN Quintana with IR at bedside. Plan to continue IV antibiotics and repeat CT A/P on 1/3.     1/1: Tmax 100.8 overnight.  COVID, flu, RSV negative.  WBC 17 from 16.5.  Hb stable at 11.8.  LFTs downtrending.  Repeat blood cultures ordered for today.  Planning for CT scan 1/3.    1/2: Tmax 101.4 overnight.  WBC 18.6 from 17.  Hb stable at 11.3.  Potassium 3.5; replaced.  LFTs continue to downtrend.  Repeat CT abdomen showing improvement in abscesses with drain in appropriate placement.  No plans for adjustment this time.  Continue IV Unasyn today.  Follow repeat blood cultures.  Repeat CBC in a.m.    1/3: Afebrile overnight.  WBC 20.4 from 18.6.  Discussed with Kamryn Sung, ABDIRAHMAN and Dr. Russo, infectious disease.  N.p.o. for  percutaneous drain exchange today with IR.    1/4: Afebrile overnight.  SBP ranging 118-142.  WBC 16.7 from 20.4.  Echocardiogram is pending to ensure no vegetations.  Blood cultures remain negative.  Continue IV Unasyn today.  Anticipate discharge early next week with IV antibiotics and close outpatient follow-up.    1/5: Afebrile overnight.   through 142.  WBC 15.3 from 16.7.  Hb stable at 11.1.  Continue IV Unasyn.  Plan on repeat CT abdomen on 1/8 or sooner if output continues to decrease. Echo pending for today.     1/6: Vitals stable overnight.  WBC 16.5 from 15.3.  Echo completed yesterday without obvious vegetations.  DANISHA drain with 20 mL output documented. Discussed with IR and infectious disease. Plan to repeat CT Abd on 1/8. Further management based on imaging.    1/7 I have seen and examined the patient at bedside  WBC 17,, still high  Bilirubin 0.3  I discussed with ID, recommended to repeat CT today.  I have placed the order  Continue Unasyn  Wound culture NTD  RUQ drain in place, output of 20 cc in 24 hours  I ordered a.m. labs to monitor    I have discussed this patient's plan of care and discharge plan at IDT rounds today with Case Management, Nursing, Nursing leadership, and other members of the IDT team.    Consultants/Specialty  infectious disease and IR    Code Status  Full Code    Disposition  The patient is not medically cleared for discharge to home or a post-acute facility.      I have placed the appropriate orders for post-discharge needs.    Review of Systems  Review of Systems   Constitutional:  Negative for fever and malaise/fatigue.   Respiratory:  Negative for shortness of breath.    Cardiovascular:  Negative for chest pain and leg swelling.   Gastrointestinal:  Negative for abdominal pain, nausea and vomiting.        Physical Exam  Temp:  [36.3 °C (97.3 °F)-36.8 °C (98.3 °F)] 36.8 °C (98.3 °F)  Pulse:  [60-73] 69  Resp:  [18] 18  BP: (113-127)/(65-75) 113/65  SpO2:  [94 %-96 %]  95 %    Physical Exam  Vitals and nursing note reviewed.   Constitutional:       General: He is not in acute distress.     Appearance: Normal appearance. He is not ill-appearing.   Cardiovascular:      Rate and Rhythm: Normal rate and regular rhythm.   Pulmonary:      Effort: Pulmonary effort is normal.      Breath sounds: Normal breath sounds.   Abdominal:      Comments: DANISHA drain to epigastric region.  Serosanguineous drainage present   Skin:     General: Skin is warm and dry.   Neurological:      Mental Status: He is alert and oriented to person, place, and time. Mental status is at baseline.   Psychiatric:         Mood and Affect: Mood normal.         Behavior: Behavior normal.         Fluids    Intake/Output Summary (Last 24 hours) at 1/7/2025 1643  Last data filed at 1/7/2025 1338  Gross per 24 hour   Intake 1200 ml   Output 1820 ml   Net -620 ml        Laboratory  Recent Labs     01/05/25  0132 01/06/25  0245 01/07/25  0024   WBC 15.3* 16.5* 17.6*   RBC 3.55* 3.88* 3.75*   HEMOGLOBIN 11.1* 11.8* 11.3*   HEMATOCRIT 33.6* 36.4* 35.5*   MCV 94.6 93.8 94.7   MCH 31.3 30.4 30.1   MCHC 33.0 32.4 31.8*   RDW 43.1 42.8 42.6   PLATELETCT 443 519* 522*   MPV 8.4* 8.3* 8.2*     Recent Labs     01/05/25  0132 01/06/25  0245   SODIUM 138 138   POTASSIUM 3.8 4.2   CHLORIDE 102 103   CO2 26 25   GLUCOSE 104* 101*   BUN 14 14   CREATININE 0.70 0.74   CALCIUM 8.3* 9.1                     Imaging  CT-ABDOMEN-PELVIS WITH   Final Result      1.  Probable mild decrease in size of abscess in the hepatic dome.   2.  Stable to slight decrease in size of tiny subcapsular fluid adjacent to segment 4.   3.  Mild decrease in small right pleural effusion and associated atelectasis.   4.  No other significant change.      EC-ECHOCARDIOGRAM COMPLETE W/O CONT   Final Result      IR-DRAINAGE-CATH EXCHANGE   Final Result      1.  Resolving right hepatic abscess.      2.  Successful locking loop catheter exchange.      CT-ABDOMEN WITH   Final  Result         1. There are new small effusions and compressive atelectasis, right worse than left.   2. There has been interval percutaneous drainage of the hepatic abscess.   3. Small left renal stone.   4. Mild hepatosplenomegaly.   5. Diverticulosis.   6. 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               CT-DRAIN-LIVER ABSCESS-CYST   Final Result      1.  CT guided placement of a percutaneous drainage catheter in a right hepatic abscess.   2.  The current plan is to obtain a follow-up CT in 5-7 days..      CT-ABDOMEN-PELVIS WITH   Final Result   Addendum (preliminary) 1 of 1   These findings were discussed with JAYDON BEYER on 12/28/2024    9:12 PM.      Final      1.  Large irregular heterogeneous low-attenuation lesion in the RIGHT lobe liver superiorly measuring 5.6 cm, most suggestive of abscess.   2.  Prominent pericaval, portacaval and debora hepatis lymph nodes, inflammatory versus neoplastic.   3.  Mild RIGHT hydronephrosis without obstructing ureteral stone demonstrated.   4.  Small nonobstructing LEFT kidney stone.      DX-CHEST-PORTABLE (1 VIEW)   Final Result      1.  No acute cardiopulmonary disease.   2.  Probable RIGHT lung base Nipple shadows.  Consider confirmation with nonemergent followup chest x-ray with nipple markers and shallow oblique views.              Assessment/Plan  * Liver abscess- (present on admission)  Assessment & Plan  CT of the abdomen pelvis with contrast: Right liver lobe lesion 5.6 cm most suggestive of abscess.  S/p drain placement 12/29  Cultures growing strep intermedius  Repeat CT abdomen 1/2: Reidentified is an area of ill-defined low-attenuation in the liver centrally. This measures 7.2 x 6.5 x 5.7 cm. There is a pigtail drainage catheter in this region on the current exam along with some new gas.  IR DANISHA drain exchange 1/3  WBC increased to 16.5 overnight  - Continue IV Unasyn  - ID following  - Repeat CMP and CBC in a.m.  - Continue drain  management per IR recommendations  - Repeat CT abdomen with on 1/8; pending those results plan for either drain exchange or hepatobiliary surgery consult    Bacteremia  Assessment & Plan  Blood cultures positive for strep intermedius 12/28  Repeat blood cultures 1/1 NGTD  Echocardiogram without obvious vegetations  - Infectious diseases following  - Continue IV Unasyn    Elevated LFTs  Assessment & Plan  LFTs improving  Presumed secondary to liver abscess  Hepatitis panel negative  - Repeat CMP in a.m.    Hydronephrosis of right kidney  Assessment & Plan  Incidental finding on CT of the abdomen with contrast: Mild right hydronephrosis without obstructing ureteral stone.    Dyslipidemia- (present on admission)  Assessment & Plan  Will hold Lipitor due to elevated LFTs    Non-Hodgkin's lymphoma (HCC)- (present on admission)  Assessment & Plan  In remission for 4-5 years   per oncology note is low-grade, not on active treatments.    Continue  surveillance per Dr. Chen    ACP (advance care planning)  Assessment & Plan  Patient admitted with liver abscess.  History of non-Hodgkin lymphoma.  Age of 69.  I discussed the CODE STATUS with him and his  at bedside, including CPR, intubation/mechanical ventilation.  He wants to stay full code if there is no terminal diagnosis or mental inability.  Continue full code.  ACP 16 minutes.  Above per previous hospitalist          VTE prophylaxis: Lovenox    I have performed a physical exam and reviewed and updated ROS and Plan today (1/7/2025). In review of yesterday's note (1/6/2025), there are no changes except as documented above.      I spent greater than 53 minutes for chart review, obtaining history independently, performing medically appropriate examination,  documenting , ordering medications, tests, or procedures, referring and communicating with other health care professionals, Independently interpreting results and communicating results with  patient/family/caregiver. More than 50% of time was spent in face-to-face clinical encounter.

## 2025-01-08 NOTE — CARE PLAN
The patient is Stable - Low risk of patient condition declining or worsening    Shift Goals  Clinical Goals: Patient safety/IV abx  Patient Goals: Rest  Family Goals: JOESPH    Progress made toward(s) clinical / shift goals:  Patient denies any pain, on IV abx, be in lowest position, resting comfortably, call light within reach.     Patient is not progressing towards the following goals:

## 2025-01-09 LAB
ALBUMIN SERPL BCP-MCNC: 3.3 G/DL (ref 3.2–4.9)
ALBUMIN/GLOB SERPL: 1.2 G/DL
ALP SERPL-CCNC: 128 U/L (ref 30–99)
ALT SERPL-CCNC: 30 U/L (ref 2–50)
ANION GAP SERPL CALC-SCNC: 9 MMOL/L (ref 7–16)
AST SERPL-CCNC: 18 U/L (ref 12–45)
BILIRUB SERPL-MCNC: 0.4 MG/DL (ref 0.1–1.5)
BUN SERPL-MCNC: 16 MG/DL (ref 8–22)
CALCIUM ALBUM COR SERPL-MCNC: 9.8 MG/DL (ref 8.5–10.5)
CALCIUM SERPL-MCNC: 9.2 MG/DL (ref 8.5–10.5)
CHLORIDE SERPL-SCNC: 103 MMOL/L (ref 96–112)
CO2 SERPL-SCNC: 27 MMOL/L (ref 20–33)
CREAT SERPL-MCNC: 0.73 MG/DL (ref 0.5–1.4)
ERYTHROCYTE [DISTWIDTH] IN BLOOD BY AUTOMATED COUNT: 42.6 FL (ref 35.9–50)
GFR SERPLBLD CREATININE-BSD FMLA CKD-EPI: 97 ML/MIN/1.73 M 2
GLOBULIN SER CALC-MCNC: 2.8 G/DL (ref 1.9–3.5)
GLUCOSE SERPL-MCNC: 96 MG/DL (ref 65–99)
HCT VFR BLD AUTO: 36.9 % (ref 42–52)
HGB BLD-MCNC: 11.7 G/DL (ref 14–18)
MAGNESIUM SERPL-MCNC: 2.3 MG/DL (ref 1.5–2.5)
MCH RBC QN AUTO: 30 PG (ref 27–33)
MCHC RBC AUTO-ENTMCNC: 31.7 G/DL (ref 32.3–36.5)
MCV RBC AUTO: 94.6 FL (ref 81.4–97.8)
PHOSPHATE SERPL-MCNC: 3.4 MG/DL (ref 2.5–4.5)
PLATELET # BLD AUTO: 524 K/UL (ref 164–446)
PMV BLD AUTO: 8.1 FL (ref 9–12.9)
POTASSIUM SERPL-SCNC: 4.1 MMOL/L (ref 3.6–5.5)
PROT SERPL-MCNC: 6.1 G/DL (ref 6–8.2)
RBC # BLD AUTO: 3.9 M/UL (ref 4.7–6.1)
SODIUM SERPL-SCNC: 139 MMOL/L (ref 135–145)
WBC # BLD AUTO: 12.8 K/UL (ref 4.8–10.8)

## 2025-01-09 PROCEDURE — 700101 HCHG RX REV CODE 250: Performed by: NURSE PRACTITIONER

## 2025-01-09 PROCEDURE — 84100 ASSAY OF PHOSPHORUS: CPT

## 2025-01-09 PROCEDURE — 770006 HCHG ROOM/CARE - MED/SURG/GYN SEMI*

## 2025-01-09 PROCEDURE — 700111 HCHG RX REV CODE 636 W/ 250 OVERRIDE (IP): Mod: JZ | Performed by: INTERNAL MEDICINE

## 2025-01-09 PROCEDURE — A9270 NON-COVERED ITEM OR SERVICE: HCPCS | Performed by: INTERNAL MEDICINE

## 2025-01-09 PROCEDURE — 85027 COMPLETE CBC AUTOMATED: CPT

## 2025-01-09 PROCEDURE — 80053 COMPREHEN METABOLIC PANEL: CPT

## 2025-01-09 PROCEDURE — 700105 HCHG RX REV CODE 258: Performed by: INTERNAL MEDICINE

## 2025-01-09 PROCEDURE — 83735 ASSAY OF MAGNESIUM: CPT

## 2025-01-09 PROCEDURE — 700102 HCHG RX REV CODE 250 W/ 637 OVERRIDE(OP): Performed by: INTERNAL MEDICINE

## 2025-01-09 PROCEDURE — 99233 SBSQ HOSP IP/OBS HIGH 50: CPT | Performed by: STUDENT IN AN ORGANIZED HEALTH CARE EDUCATION/TRAINING PROGRAM

## 2025-01-09 RX ADMIN — AMPICILLIN AND SULBACTAM 3 G: 1; 2 INJECTION, POWDER, FOR SOLUTION INTRAMUSCULAR; INTRAVENOUS at 06:01

## 2025-01-09 RX ADMIN — MONTELUKAST 10 MG: 10 TABLET, FILM COATED ORAL at 17:09

## 2025-01-09 RX ADMIN — SODIUM CHLORIDE, PRESERVATIVE FREE 10 ML: 5 INJECTION INTRAVENOUS at 17:12

## 2025-01-09 RX ADMIN — SODIUM CHLORIDE, PRESERVATIVE FREE 10 ML: 5 INJECTION INTRAVENOUS at 06:00

## 2025-01-09 RX ADMIN — AMPICILLIN AND SULBACTAM 3 G: 1; 2 INJECTION, POWDER, FOR SOLUTION INTRAMUSCULAR; INTRAVENOUS at 11:36

## 2025-01-09 RX ADMIN — AMPICILLIN AND SULBACTAM 3 G: 1; 2 INJECTION, POWDER, FOR SOLUTION INTRAMUSCULAR; INTRAVENOUS at 17:10

## 2025-01-09 ASSESSMENT — PAIN DESCRIPTION - PAIN TYPE
TYPE: ACUTE PAIN
TYPE: ACUTE PAIN

## 2025-01-09 ASSESSMENT — ENCOUNTER SYMPTOMS
COUGH: 0
ABDOMINAL PAIN: 0
FEVER: 0
WEAKNESS: 0
MYALGIAS: 0
SHORTNESS OF BREATH: 0
VOMITING: 0
CHILLS: 0
NAUSEA: 0

## 2025-01-09 NOTE — DISCHARGE PLANNING
HTH/SCP TCN chart review completed. Collaborated with AMERICA Foreman. Discussed current discharge considerations noted to home/ self care. CM advised of no additional TCN needs in patient discharge planning at this time. Thank you.    Completed:  Choice obtained: HH (1. [facility] , 2. [facility]) & Infusion ([facility]) if needed   Pt aware of [facility]'s blanket referral policy  SCP with [facility] PCP. Hospital follow up with primary care is scheduled for 1/14

## 2025-01-09 NOTE — DISCHARGE PLANNING
Case Management Discharge Planning    Admission Date: 12/28/2024  GMLOS: 3.2  ALOS: 12    6-Clicks ADL Score: 22  6-Clicks Mobility Score: 20      Anticipated Discharge Dispo: Discharge Disposition: Discharged to home/self care (01)    DME Needed: No    Action(s) Taken: LMSW met with the patient at bedside to complete an IMM. IMM charted and scanned to DPA.     Escalations Completed: None    Medically Clear: Yes    Next Steps: Patient to discharge today.    Barriers to Discharge: None    Is the patient up for discharge tomorrow: No

## 2025-01-09 NOTE — PROGRESS NOTES
Assumed care of patient at 1845. Bedside report received from day RN. Assessment complete.  AA&Ox4 Denies CP/SOB.  Reporting 1/10 pain. Medicated per MAR. Educated patient regarding pharmacologic and non pharmacologic modalities for pain management.  Skin per flowsheets  Tolerating Regular diet. Denies N/V.  + void. Last BM 1/7  Pt ambulates Independently.  All needs met at this time. Call light within reach. Pt calls appropriately. Bed low and locked, non skid socks in place. Hourly rounding in place.

## 2025-01-09 NOTE — CARE PLAN
The patient is Stable - Low risk of patient condition declining or worsening    Shift Goals  Clinical Goals: Pt will report a pain level of 0/10. Pt drain wll be monitored  Patient Goals: Rest  Family Goals: JOESPH    Progress made toward(s) clinical / shift goals:    Problem: Pain - Standard  Goal: Alleviation of pain or a reduction in pain to the patient’s comfort goal  Outcome: Progressing  Note: Pt stated 1/10 pain, was provided with tylenol, and now states 0/10 pain.      Problem: Gastrointestinal Irritability  Goal: Nausea and vomiting will be absent or improve  Outcome: Progressing  Note: Pt states no nausea or vomiting        Patient is not progressing towards the following goals:

## 2025-01-09 NOTE — PROGRESS NOTES
[facility] Medicine Daily Progress Note    Date of Service  1/8/2025    Chief Complaint  Jet Webb is a 69 y.o. male admitted 12/28/2024 with fever and chills.    Hospital Course  Jet Webb is a 69-year-old male with PMHx non-Hodgkin's lymphoma, nephrolithiasis, HLD.  Admitted 12/28 for right sided liver abscesses.    Initially, patient presenting with fever, chills. CT of the abdomen pelvis with contrast: Right liver lobe lesion 5.6 cm most suggestive of abscess. Prominent pericaval, portacaval and debora hepatis lymph nodes. Mild right hydronephrosis without obstructing ureteral stones demonstrated.     Patient was started on IV Zosyn.  IR was consulted.  Patient underwent CT-guided drainage on 12/29.    Interval Problem Update  12/30: Vitals notable for Tmax 100.7.  Intermittent tachycardia.  SBP ranging 106 through 159.  DANISHA drain placed yesterday.  Wound cultures pending.    12/31: Patient remains afebrile.   through 144.  Resting comfortably on room air.  WBC 16.5 from 20.8.  Hb stable at 11.1.  LFTs downtrending.  Wound cultures positive for strep intermedius.  Discontinue IV Zosyn.  Start IV Unasyn.  Plan to repeat CT A/P on 1/3. Discussed with JUAN Quintana with IR at bedside. Plan to continue IV antibiotics and repeat CT A/P on 1/3.     1/1: Tmax 100.8 overnight.  COVID, flu, RSV negative.  WBC 17 from 16.5.  Hb stable at 11.8.  LFTs downtrending.  Repeat blood cultures ordered for today.  Planning for CT scan 1/3.    1/2: Tmax 101.4 overnight.  WBC 18.6 from 17.  Hb stable at 11.3.  Potassium 3.5; replaced.  LFTs continue to downtrend.  Repeat CT abdomen showing improvement in abscesses with drain in appropriate placement.  No plans for adjustment this time.  Continue IV Unasyn today.  Follow repeat blood cultures.  Repeat CBC in a.m.    1/3: Afebrile overnight.  WBC 20.4 from 18.6.  Discussed with Kamryn Sung, ABDIRAHMAN and Dr. Russo, infectious disease.  N.p.o. for  percutaneous drain exchange today with IR.    1/4: Afebrile overnight.  SBP ranging 118-142.  WBC 16.7 from 20.4.  Echocardiogram is pending to ensure no vegetations.  Blood cultures remain negative.  Continue IV Unasyn today.  Anticipate discharge early next week with IV antibiotics and close outpatient follow-up.    1/5: Afebrile overnight.   through 142.  WBC 15.3 from 16.7.  Hb stable at 11.1.  Continue IV Unasyn.  Plan on repeat CT abdomen on 1/8 or sooner if output continues to decrease. Echo pending for today.     1/6: Vitals stable overnight.  WBC 16.5 from 15.3.  Echo completed yesterday without obvious vegetations.  DANISHA drain with 20 mL output documented. Discussed with IR and infectious disease. Plan to repeat CT Abd on 1/8. Further management based on imaging.    1/7 I have seen and examined the patient at bedside  WBC 17,, still high  Bilirubin 0.3  I discussed with ID, recommended to repeat CT today.  I have placed the order  Continue Unasyn  Wound culture NTD  RUQ drain in place, output of 20 cc in 24 hours  I ordered a.m. labs to monitor    1/8 I have seen and examined the patient bedside    s/p Abscessogram with drain exchange/upsize 1/8    WBC 16  Continue Unasyn  I discussed with radiology and ID      I have discussed this patient's plan of care and discharge plan at IDT rounds today with Case Management, Nursing, Nursing leadership, and other members of the IDT team.    Consultants/Specialty  infectious disease and IR    Code Status  Full Code    Disposition  The patient is not medically cleared for discharge to home or a post-acute facility.      I have placed the appropriate orders for post-discharge needs.    Review of Systems  Review of Systems   Constitutional:  Negative for fever and malaise/fatigue.   Respiratory:  Negative for shortness of breath.    Cardiovascular:  Negative for chest pain and leg swelling.   Gastrointestinal:  Negative for abdominal pain, nausea and vomiting.         Physical Exam  Temp:  [36.1 °C (96.9 °F)-36.8 °C (98.2 °F)] 36.7 °C (98.1 °F)  Pulse:  [62-73] 65  Resp:  [12-23] 18  BP: ()/(59-77) 110/59  SpO2:  [92 %-98 %] 94 %    Physical Exam  Vitals and nursing note reviewed.   Constitutional:       General: He is not in acute distress.     Appearance: Normal appearance. He is not ill-appearing.   Cardiovascular:      Rate and Rhythm: Normal rate and regular rhythm.   Pulmonary:      Effort: Pulmonary effort is normal.      Breath sounds: Normal breath sounds.   Abdominal:      Comments: DANISHA drain to epigastric region.  Serosanguineous drainage present   Skin:     General: Skin is warm and dry.   Neurological:      Mental Status: He is alert and oriented to person, place, and time. Mental status is at baseline.   Psychiatric:         Mood and Affect: Mood normal.         Behavior: Behavior normal.         Fluids    Intake/Output Summary (Last 24 hours) at 1/8/2025 1816  Last data filed at 1/8/2025 0526  Gross per 24 hour   Intake 480 ml   Output 690 ml   Net -210 ml        Laboratory  Recent Labs     01/06/25  0245 01/07/25  0024 01/08/25  0034   WBC 16.5* 17.6* 16.5*   RBC 3.88* 3.75* 3.68*   HEMOGLOBIN 11.8* 11.3* 11.3*   HEMATOCRIT 36.4* 35.5* 35.3*   MCV 93.8 94.7 95.9   MCH 30.4 30.1 30.7   MCHC 32.4 31.8* 32.0*   RDW 42.8 42.6 43.1   PLATELETCT 519* 522* 529*   MPV 8.3* 8.2* 8.3*     Recent Labs     01/06/25  0245 01/08/25  0034   SODIUM 138 138   POTASSIUM 4.2 3.7   CHLORIDE 103 102   CO2 25 27   GLUCOSE 101* 97   BUN 14 14   CREATININE 0.74 0.75   CALCIUM 9.1 8.9                     Imaging  IG-ERAICNV-S-GRAM   Final Result         1.  Abscessogram demonstrating moderate size residual abscess cavity.      2. Exchange of the existing percutaneous 12 Telugu drainage catheter for a new 14 Telugu drainage catheter.      CT-ABDOMEN-PELVIS WITH   Final Result      1.  Probable mild decrease in size of abscess in the hepatic dome.   2.  Stable to slight decrease in  size of tiny subcapsular fluid adjacent to segment 4.   3.  Mild decrease in small right pleural effusion and associated atelectasis.   4.  No other significant change.      EC-ECHOCARDIOGRAM COMPLETE W/O CONT   Final Result      IR-DRAINAGE-CATH EXCHANGE   Final Result      1.  Resolving right hepatic abscess.      2.  Successful locking loop catheter exchange.      CT-ABDOMEN WITH   Final Result         1. There are new small effusions and compressive atelectasis, right worse than left.   2. There has been interval percutaneous drainage of the hepatic abscess.   3. Small left renal stone.   4. Mild hepatosplenomegaly.   5. Diverticulosis.   6. 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               CT-DRAIN-LIVER ABSCESS-CYST   Final Result      1.  CT guided placement of a percutaneous drainage catheter in a right hepatic abscess.   2.  The current plan is to obtain a follow-up CT in 5-7 days..      CT-ABDOMEN-PELVIS WITH   Final Result   Addendum (preliminary) 1 of 1   These findings were discussed with JAYDON BEYER on 12/28/2024    9:12 PM.      Final      1.  Large irregular heterogeneous low-attenuation lesion in the RIGHT lobe liver superiorly measuring 5.6 cm, most suggestive of abscess.   2.  Prominent pericaval, portacaval and debora hepatis lymph nodes, inflammatory versus neoplastic.   3.  Mild RIGHT hydronephrosis without obstructing ureteral stone demonstrated.   4.  Small nonobstructing LEFT kidney stone.      DX-CHEST-PORTABLE (1 VIEW)   Final Result      1.  No acute cardiopulmonary disease.   2.  Probable RIGHT lung base Nipple shadows.  Consider confirmation with nonemergent followup chest x-ray with nipple markers and shallow oblique views.              Assessment/Plan  * Liver abscess- (present on admission)  Assessment & Plan  CT of the abdomen pelvis with contrast: Right liver lobe lesion 5.6 cm most suggestive of abscess.  S/p drain placement 12/29  Cultures growing  strep intermedius  Repeat CT abdomen 1/2: Reidentified is an area of ill-defined low-attenuation in the liver centrally. This measures 7.2 x 6.5 x 5.7 cm. There is a pigtail drainage catheter in this region on the current exam along with some new gas.  IR DANISHA drain exchange 1/3  WBC increased to 16.5 overnight  - Continue IV Unasyn  - ID following  - Repeat CMP and CBC in a.m.  - Continue drain management per IR recommendations  - Repeat CT abdomen with on 1/8; pending those results plan for either drain exchange or hepatobiliary surgery consult    Bacteremia  Assessment & Plan  Blood cultures positive for strep intermedius 12/28  Repeat blood cultures 1/1 NGTD  Echocardiogram without obvious vegetations  - Infectious diseases following  - Continue IV Unasyn    Elevated LFTs  Assessment & Plan  LFTs improving  Presumed secondary to liver abscess  Hepatitis panel negative  - Repeat CMP in a.m.    Hydronephrosis of right kidney  Assessment & Plan  Incidental finding on CT of the abdomen with contrast: Mild right hydronephrosis without obstructing ureteral stone.    Dyslipidemia- (present on admission)  Assessment & Plan  Will hold Lipitor due to elevated LFTs    Non-Hodgkin's lymphoma (HCC)- (present on admission)  Assessment & Plan  In remission for 4-5 years   per oncology note is low-grade, not on active treatments.    Continue  surveillance per Dr. Chen    ACP (advance care planning)  Assessment & Plan  Patient admitted with liver abscess.  History of non-Hodgkin lymphoma.  Age of 69.  I discussed the CODE STATUS with him and his  at bedside, including CPR, intubation/mechanical ventilation.  He wants to stay full code if there is no terminal diagnosis or mental inability.  Continue full code.  ACP 16 minutes.  Above per previous hospitalist          VTE prophylaxis: Lovenox    I have performed a physical exam and reviewed and updated ROS and Plan today (1/8/2025). In review of yesterday's note (1/7/2025),  there are no changes except as documented above.      I spent greater than 52 minutes for chart review, obtaining history independently, performing medically appropriate examination,  documenting , ordering medications, tests, or procedures, referring and communicating with other health care professionals, Independently interpreting results and communicating results with patient/family/caregiver. More than 50% of time was spent in face-to-face clinical encounter.   VTE Selection

## 2025-01-09 NOTE — CARE PLAN
The patient is Watcher - Medium risk of patient condition declining or worsening    Shift Goals  Clinical Goals: Drain management, monitor wbc  Patient Goals: Rest  Family Goals: JOESPH    Progress made toward(s) clinical / shift goals:    Problem: Knowledge Deficit - Standard  Goal: Patient and family/care givers will demonstrate understanding of plan of care, disease process/condition, diagnostic tests and medications  Outcome: Progressing  Note: Pt aox4. Able to make needs known. No complaints of pain or discomfort. Remains on iv abx with no adverse reactions. Vasyl drain intact and patent draining bright red output. WBC still above normal levels but trending down      Problem: Fall Risk  Goal: Patient will remain free from falls  Outcome: Progressing     Problem: Pain - Standard  Goal: Alleviation of pain or a reduction in pain to the patient’s comfort goal  Outcome: Progressing     Problem: Discharge Barriers/Planning  Goal: Patient's continuum of care needs are met  Outcome: Progressing     Problem: Gastrointestinal Irritability  Goal: Nausea and vomiting will be absent or improve  Outcome: Progressing       Patient is not progressing towards the following goals:

## 2025-01-10 ENCOUNTER — PHARMACY VISIT (OUTPATIENT)
Dept: PHARMACY | Facility: MEDICAL CENTER | Age: 70
End: 2025-01-10
Payer: COMMERCIAL

## 2025-01-10 VITALS
OXYGEN SATURATION: 95 % | WEIGHT: 163.36 LBS | SYSTOLIC BLOOD PRESSURE: 121 MMHG | RESPIRATION RATE: 18 BRPM | HEART RATE: 65 BPM | HEIGHT: 68 IN | DIASTOLIC BLOOD PRESSURE: 63 MMHG | BODY MASS INDEX: 24.76 KG/M2 | TEMPERATURE: 97.5 F

## 2025-01-10 DIAGNOSIS — K75.0 LIVER ABSCESS: ICD-10-CM

## 2025-01-10 LAB
ALBUMIN SERPL BCP-MCNC: 3.4 G/DL (ref 3.2–4.9)
ALBUMIN/GLOB SERPL: 1.2 G/DL
ALP SERPL-CCNC: 125 U/L (ref 30–99)
ALT SERPL-CCNC: 23 U/L (ref 2–50)
ANION GAP SERPL CALC-SCNC: 8 MMOL/L (ref 7–16)
AST SERPL-CCNC: 18 U/L (ref 12–45)
BILIRUB SERPL-MCNC: 0.3 MG/DL (ref 0.1–1.5)
BUN SERPL-MCNC: 16 MG/DL (ref 8–22)
CALCIUM ALBUM COR SERPL-MCNC: 9.6 MG/DL (ref 8.5–10.5)
CALCIUM SERPL-MCNC: 9.1 MG/DL (ref 8.5–10.5)
CHLORIDE SERPL-SCNC: 104 MMOL/L (ref 96–112)
CO2 SERPL-SCNC: 26 MMOL/L (ref 20–33)
CREAT SERPL-MCNC: 0.9 MG/DL (ref 0.5–1.4)
ERYTHROCYTE [DISTWIDTH] IN BLOOD BY AUTOMATED COUNT: 42.9 FL (ref 35.9–50)
GFR SERPLBLD CREATININE-BSD FMLA CKD-EPI: 91 ML/MIN/1.73 M 2
GLOBULIN SER CALC-MCNC: 2.9 G/DL (ref 1.9–3.5)
GLUCOSE SERPL-MCNC: 98 MG/DL (ref 65–99)
HCT VFR BLD AUTO: 36.6 % (ref 42–52)
HGB BLD-MCNC: 11.9 G/DL (ref 14–18)
MAGNESIUM SERPL-MCNC: 2.2 MG/DL (ref 1.5–2.5)
MCH RBC QN AUTO: 30.4 PG (ref 27–33)
MCHC RBC AUTO-ENTMCNC: 32.5 G/DL (ref 32.3–36.5)
MCV RBC AUTO: 93.6 FL (ref 81.4–97.8)
PHOSPHATE SERPL-MCNC: 3.3 MG/DL (ref 2.5–4.5)
PLATELET # BLD AUTO: 549 K/UL (ref 164–446)
PMV BLD AUTO: 8.2 FL (ref 9–12.9)
POTASSIUM SERPL-SCNC: 3.9 MMOL/L (ref 3.6–5.5)
PROT SERPL-MCNC: 6.3 G/DL (ref 6–8.2)
RBC # BLD AUTO: 3.91 M/UL (ref 4.7–6.1)
SODIUM SERPL-SCNC: 138 MMOL/L (ref 135–145)
WBC # BLD AUTO: 14.8 K/UL (ref 4.8–10.8)

## 2025-01-10 PROCEDURE — 84100 ASSAY OF PHOSPHORUS: CPT

## 2025-01-10 PROCEDURE — 700111 HCHG RX REV CODE 636 W/ 250 OVERRIDE (IP): Mod: JZ | Performed by: INTERNAL MEDICINE

## 2025-01-10 PROCEDURE — 85027 COMPLETE CBC AUTOMATED: CPT

## 2025-01-10 PROCEDURE — 700105 HCHG RX REV CODE 258: Performed by: INTERNAL MEDICINE

## 2025-01-10 PROCEDURE — 700101 HCHG RX REV CODE 250: Performed by: NURSE PRACTITIONER

## 2025-01-10 PROCEDURE — 80053 COMPREHEN METABOLIC PANEL: CPT

## 2025-01-10 PROCEDURE — RXMED WILLOW AMBULATORY MEDICATION CHARGE: Performed by: STUDENT IN AN ORGANIZED HEALTH CARE EDUCATION/TRAINING PROGRAM

## 2025-01-10 PROCEDURE — 83735 ASSAY OF MAGNESIUM: CPT

## 2025-01-10 PROCEDURE — 99239 HOSP IP/OBS DSCHRG MGMT >30: CPT | Performed by: STUDENT IN AN ORGANIZED HEALTH CARE EDUCATION/TRAINING PROGRAM

## 2025-01-10 RX ADMIN — AMPICILLIN AND SULBACTAM 3 G: 1; 2 INJECTION, POWDER, FOR SOLUTION INTRAMUSCULAR; INTRAVENOUS at 06:13

## 2025-01-10 RX ADMIN — AMPICILLIN AND SULBACTAM 3 G: 1; 2 INJECTION, POWDER, FOR SOLUTION INTRAMUSCULAR; INTRAVENOUS at 00:16

## 2025-01-10 RX ADMIN — SODIUM CHLORIDE, PRESERVATIVE FREE 10 ML: 5 INJECTION INTRAVENOUS at 06:00

## 2025-01-10 RX ADMIN — AMPICILLIN AND SULBACTAM 3 G: 1; 2 INJECTION, POWDER, FOR SOLUTION INTRAMUSCULAR; INTRAVENOUS at 11:53

## 2025-01-10 ASSESSMENT — ENCOUNTER SYMPTOMS
VOMITING: 0
WEAKNESS: 0
COUGH: 0
MYALGIAS: 0
FEVER: 0
SHORTNESS OF BREATH: 0
NAUSEA: 0
ABDOMINAL PAIN: 0
CHILLS: 0

## 2025-01-10 ASSESSMENT — PAIN DESCRIPTION - PAIN TYPE: TYPE: ACUTE PAIN

## 2025-01-10 NOTE — DISCHARGE PLANNING
HTH/SCP TCN chart review completed. As prior, current discharge considerations noted to be for dc to home with possible HH services if indicated by providers at time of dc. Note that 6 clicks mobility remains 20 and per kardex pt remains ambulatory around unit with no AD as well. No additional TCN needs in patient discharge planning at this time. Thank you.     Completed:  Choice obtained: HH (1. [facility], 2. [facility]) & Infusion ([facility]) if needed   Pt aware of [facility]'s blanket referral policy  SCP with [facility] PCP. Hospital follow up with primary care is scheduled for 1/14

## 2025-01-10 NOTE — PROGRESS NOTES
Radiology Progress Note     Author: Danelle Wilson DNP Date & Time created: 1/9/2025  4:38 PM   Date of admission  12/28/2024  Note to reader: this note follows the APSO format rather than the historical SOAP format. Assessment and plan located at the top of the note for ease of use.    Chief Complaint  69 y.o. male admitted 12/28/2024 with   Chief Complaint   Patient presents with    Sent from Urgent Care      wbc 24, fever, chills. Called patient who requested antibiotics however at this time there is no clear source of infection. He has a history of non-Hodgkin's lymphoma and reports new lymphadenopathy. Further work up is needed as fevers ar on going with no known source of infection. Advised patient to go to  ED further evaluation; he verbalized understanding and intention to go to ED.    Fever     X 3 days now. Pt states taking advil for fever however it comes right back.     Urinary Frequency     Pt endorses urinary urgency  has gotten much worse the last 3/4 days        HPI  Jte Webb is a 69-year-old male with past medical history significant for non-Hodgkin's lymphoma, nephrolithiasis, and HLD admitted 12/28/2024 for CT finding of right hepatic fluid collection concerning for abscess after presenting to the ER with fevers and chills.  IR was consulted and patient underwent CT-guided liver abscess drain placement with IR Dr. Chandler on 12/29/2024    Interval History:   12/30/2024 -hepatic abscess 12F drain to RUQ with 140 mL slightly turbid serosanguinous output in the last 24 hours. Labs reviewed; WBC 20.8, H&H 11.5/33.9, , , alk phos 126, creatinine 0.84.  Hepatic fluid cultures pending. Drain flushed with 10 mL NS. I reviewed IDT notes and images.    12/31/24 - Hepatic abscess 12F drain to RUQ with 68 mL seropurulent output in the last 24 hours. Labs reviewed; WBC 16.5, H&H 11.1/33.0, AST 52, , alk phos 125, creatinine 0.73.  Hepatic fluid cultures preliminarily positive for  streptococcus intermedius. Drain flushed with 10 mL NS. I reviewed IDT notes and images and discussed patient with Dr. Griffin.     01/01/25 - Hepatic abscess 12F drain to RUQ with 50 mL serosanguinous output in the last 24 hours. Labs reviewed; WBC 17.0, H&H 11.8/35.8, AST 48, ALT 91, alk phos 149, creatinine 0.78.  Hepatic fluid cultures positive for streptococcus intermedius, on abx through ID. Drain flushed with 20 mL NS with 10 mLs able to be aspirated back. I reviewed IDT notes.     01/02/25 - Hepatic abscess 12F drain to RUQ with 27.5 mL serosanguinous output in the last 24 hours. Labs reviewed; WBC 18.6, H&H 11.3/34.0, AST 45, ALT 76, alk phos 161, creatinine 0.73.  Drain flushed with 20 mL NS with 10 mLs able to be aspirated back. I reviewed IDT notes and discussed POC with Dr Griffin.     01/03/25 - Hepatic abscess 12F drain to RUQ with 10 mL serosanguinous output in the last 24 hours. Labs reviewed; WBC 20.4, H&H 11.0/34.4, AST 23, ALT 54, alk phos 139, creatinine 0.66.  I reviewed IDT notes and discussed POC with Dr Griffin.     01/04/25 - Hepatic abscess 12F drain to RUQ with 1.5 mL serosanguinous output in the last 24 hours. Labs reviewed; WBC 16.7, H&H 10.8/33.7, AST 22, ALT 45, alk phos 135, creatinine 0.62.  I reviewed IDT notes, imaging from recent procedure, and discussed POC with Dr Griffin.     01/05/25 - Hepatic abscess 12F drain to RUQ with no output recorded in the last 24 hours. Labs reviewed; WBC 15.3, H&H 11.1/33.6, AST 23, ALT 42, alk phos 132, creatinine 0.62.  I reviewed IDT notes, and discussed POC with Dr Griffin and Dr Pickard, ID.    1/6/2025: RUQ hepatic abscess drain (12F) to bulb suction with 20 mL turbid tan fluid out in the last 24 hours.  Irrigated drain with 10 mL sterile saline with 10 mL turbid return.  Patient HAYDEN to PPTE, seated in bed, noting that he feels pretty good, is just bored.  Patient denies abdominal pain, change in discomfort with irrigation, nausea, or  "vomiting.  I reviewed patient's most recent labs including WBC 16.5<15.3, Hgb 11.8, CR 0.74.  Coordinated post IR procedure care with patient, hospitalist, and hospital nursing staff.    1/7/2025: RUQ hepatic abscess drain (12F) to bulb suction with 20 mL serous fluid out in the last 24 hours.  Irrigated drain with 10 mL sterile saline with 4 mL of serous return.  Patient HAYDEN to PPTE, seated in bed, awaiting pending CT.  He denies abdominal pain, nausea, or vomiting.  I reviewed patient's most recent labs including WC BC 17.6<16.5 (continuing to trend upward), Hgb 11.3, CR 0.74.  Coordinated post IR procedure care with patient, hospitalist, and hospital nursing staff.    1/8/2025: IR Dr. Chandler completed an abscessogram with drain exchange/upsize (14F).    1/9/2025: RUQ hepatic abscess drain (14F) to bulb suction with 200 mL turbid serosanguineous fluid out in the last 24 hours.  Irrigated drain with 10 mL sterile saline with 6 mL turbid serosanguineous return.  Demonstration and teach back for patient and his spouse-all questions answered.  Drain care and maintenance supplies available.  Patient is HAYDEN to P PTE, seated in bed, noting he \"feels good\", although a little restless.  No abdominal pain, discomfort with irrigation, nausea or vomiting noted.  I reviewed patient's most recent labs including WBC 12.8<16.5, Hgb 11.7, CR 0.73.  Coordinated post IR procedure care with patient, spouse, interventional radiologist, ID, hospitalist, and hospital nursing staff.    Assessment/Plan     Principal Problem:    Liver abscess  Active Problems:    Non-Hodgkin's lymphoma (HCC)    Dyslipidemia    Hydronephrosis of right kidney    Elevated LFTs    ACP (advance care planning)    Bacteremia      Plan IR  - Pending discharge home (1/10/2025) with drain in place  - Continue order to irrigate RUQ drain with 10 ml of sterile saline each shift  - Liver abscess fluid cultures positive for Streptococcus intermedius  - Antimicrobials " per ID recommendation  - ID following    - Outpatient follow-up with infectious disease clinic  - Drain care education and flushing materials provided to patient  - Continue to monitor drains, VS, and labs      -Thank you for allowing Interventional Radiology team to participate in the patients care, if any additional care or requests are needed in the future please do not hesitate to call or place IR order           Review of Systems  Physical Exam   Review of Systems   Constitutional:  Negative for chills and fever.   Respiratory:  Negative for cough and shortness of breath.    Cardiovascular:  Negative for chest pain.   Gastrointestinal:  Negative for abdominal pain, nausea and vomiting.   Musculoskeletal:  Negative for myalgias.   Neurological:  Negative for weakness.      Vitals:    01/09/25 1620   BP: 117/67   Pulse: 71   Resp: 19   Temp: 36.6 °C (97.9 °F)   SpO2: 93%        Physical Exam  Vitals and nursing note reviewed.   Constitutional:       General: He is not in acute distress.     Appearance: He is not ill-appearing.   HENT:      Head: Atraumatic.   Cardiovascular:      Rate and Rhythm: Normal rate.      Pulses: Normal pulses.   Pulmonary:      Effort: Pulmonary effort is normal. No respiratory distress.   Abdominal:      General: There is no distension.      Tenderness: There is no abdominal tenderness.      Comments: IR drain to RUQ   Musculoskeletal:      Right lower leg: No edema.      Left lower leg: No edema.   Skin:     General: Skin is warm and dry.   Neurological:      General: No focal deficit present.      Mental Status: He is alert and oriented to person, place, and time.   Psychiatric:         Mood and Affect: Mood normal.         Behavior: Behavior normal.             Labs    Recent Labs     01/07/25  0024 01/08/25  0034 01/09/25  0525   WBC 17.6* 16.5* 12.8*   RBC 3.75* 3.68* 3.90*   HEMOGLOBIN 11.3* 11.3* 11.7*   HEMATOCRIT 35.5* 35.3* 36.9*   MCV 94.7 95.9 94.6   MCH 30.1 30.7 30.0    MCHC 31.8* 32.0* 31.7*   RDW 42.6 43.1 42.6   PLATELETCT 522* 529* 524*   MPV 8.2* 8.3* 8.1*     Recent Labs     01/08/25  0034 01/09/25  0525   SODIUM 138 139   POTASSIUM 3.7 4.1   CHLORIDE 102 103   CO2 27 27   GLUCOSE 97 96   BUN 14 16   CREATININE 0.75 0.73   CALCIUM 8.9 9.2     Recent Labs     01/08/25  0034 01/09/25  0525   ALBUMIN 3.1* 3.3   TBILIRUBIN 0.3 0.4   ALKPHOSPHAT 126* 128*   TOTPROTEIN 5.9* 6.1   ALTSGPT 34 30   ASTSGOT 22 18   CREATININE 0.75 0.73     [id number]-GRAM   Final Result         1.  Abscessogram demonstrating moderate size residual abscess cavity.      2. Exchange of the existing percutaneous 12 Tajik drainage catheter for a new 14 Tajik drainage catheter.      CT-ABDOMEN-PELVIS WITH   Final Result      1.  Probable mild decrease in size of abscess in the hepatic dome.   2.  Stable to slight decrease in size of tiny subcapsular fluid adjacent to segment 4.   3.  Mild decrease in small right pleural effusion and associated atelectasis.   4.  No other significant change.      EC-ECHOCARDIOGRAM COMPLETE W/O CONT   Final Result      IR-DRAINAGE-CATH EXCHANGE   Final Result      1.  Resolving right hepatic abscess.      2.  Successful locking loop catheter exchange.      CT-ABDOMEN WITH   Final Result         1. There are new small effusions and compressive atelectasis, right worse than left.   2. There has been interval percutaneous drainage of the hepatic abscess.   3. Small left renal stone.   4. Mild hepatosplenomegaly.   5. Diverticulosis.   6. 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               CT-DRAIN-LIVER ABSCESS-CYST   Final Result      1.  CT guided placement of a percutaneous drainage catheter in a right hepatic abscess.   2.  The current plan is to obtain a follow-up CT in 5-7 days..      CT-ABDOMEN-PELVIS WITH   Final Result   Addendum (preliminary) 1 of 1   These findings were discussed with JAYDON BEYER on 12/28/2024    9:12 PM.      Final     "  1.  Large irregular heterogeneous low-attenuation lesion in the RIGHT lobe liver superiorly measuring 5.6 cm, most suggestive of abscess.   2.  Prominent pericaval, portacaval and debora hepatis lymph nodes, inflammatory versus neoplastic.   3.  Mild RIGHT hydronephrosis without obstructing ureteral stone demonstrated.   4.  Small nonobstructing LEFT kidney stone.      DX-CHEST-PORTABLE (1 VIEW)   Final Result      1.  No acute cardiopulmonary disease.   2.  Probable RIGHT lung base Nipple shadows.  Consider confirmation with nonemergent followup chest x-ray with nipple markers and shallow oblique views.           INR   Date Value Ref Range Status   12/29/2024 1.37 (H) 0.87 - 1.13 Final     Comment:     INR - Non-therapeutic Reference Range: 0.87-1.13  INR - Therapeutic Reference Range: 2.0-4.0       No results found for: \"POCINR\"     Intake/Output Summary (Last 24 hours) at 12/30/2024 1036  Last data filed at 12/30/2024 0948  Gross per 24 hour   Intake 3080 ml   Output 1690 ml   Net 1390 ml      I have personally reviewed the above labs and imaging      I have performed a physical exam and reviewed and updated ROS and Plan today (1/9/2025).     37 minutes in directly providing and coordinating care and extensive data review.  No time overlap and excludes procedures.   "

## 2025-01-10 NOTE — DISCHARGE INSTRUCTIONS
- Follow up with primary care physician in 1 week.   - Follow up with ID as instructed  - Please take the medications as instructed    Please come back to ER if you develop new fever chills or abdominal pain    - Go to the local Emergency Department if you have any worsening condition.

## 2025-01-10 NOTE — PROGRESS NOTES
Pt to discharge lounge with transport. Pt provided with supplies for drain prior to discharge. All belongings on patient.

## 2025-01-10 NOTE — PROGRESS NOTES
Brief ID note:  Patient was discharged prior to being seen but plan was discussed with ABDIRAHMAN Wilson and Dr. Sales hospitalist. White count fluctuating again, back up to 14.  Possible options include staying in the hospital and repeating CT scan once again in 5 days, however IR has no further escalation options for better drainage and the next source control option would be surgery which is more of a last resort option so after discussion patient opted to discharge with drain in place and repeat labs and imaging in the outpatient setting, which is reasonable    Assessment/Hospital course:  This is a 69-year-old male patient with history of non-Hodgkin's lymphoma currently on surveillance, history of nephrolithiasis, hyperlipidemia, presented with fevers and chills with a CT scan consistent with a 5.6 cm right liver abscess.  Patient underwent CT-guided drainage on 12/29, repeat CT scan with no improvement so drain exchange performed by IR on 1/3.  Blood cultures x 2 from 12/28 and IR drain cultures positive for Strep intermedius.  Patient did have dental work done, 3 crowns, before Thanksgiving.  No dental abscesses, no current toothache    Pertinent Diagnoses:  Strep intermedius bacteremia  Liver abscess  Non-Hodgkin's lymphoma, currently on surveillance     PLAN:   -Repeat CT scan continue to demonstrate very slow improvement in size of abscess.  IR performed drain upsize 1/8, white count down to 12 initially but fluctuating again, back up to 14 today  -TTE with no obvious valvular vegetations, pulmonic valve not well-visualized, tricuspid valve with some myxomatous changes with moderate TR. Not persistently bacteremic so avoiding CASANDRA    Disposition: Anticipate about 6 to 8 weeks of p.o. Augmentin 875 mg twice daily. Will repeat labs on Monday and repeat CT scan in 2 weeks (ordered)    Discussed with Dr. Sales and Danelle GARY APRN.  ID clinic follow-up in 1 week with RUPALI Booker

## 2025-01-10 NOTE — PROGRESS NOTES
[facility] Medicine Daily Progress Note    Date of Service  1/9/2025    Chief Complaint  Jet Webb is a 69 y.o. male admitted 12/28/2024 with fever and chills.    Hospital Course  Jet Webb is a 69-year-old male with PMHx non-Hodgkin's lymphoma, nephrolithiasis, HLD.  Admitted 12/28 for right sided liver abscesses.    Initially, patient presenting with fever, chills. CT of the abdomen pelvis with contrast: Right liver lobe lesion 5.6 cm most suggestive of abscess. Prominent pericaval, portacaval and debora hepatis lymph nodes. Mild right hydronephrosis without obstructing ureteral stones demonstrated.     Patient was started on IV Zosyn.  IR was consulted.  Patient underwent CT-guided drainage on 12/29.     drain placed 12/29 .  Start IV Unasyn. Repeat CT abdomen showing improvement in abscesses with drain in appropriate placement.    LFTs continue to downtrend.       Interval Problem Update  I have seen and examined the patient at bedside    s/p Abscessogram with drain exchange/upsize 1/8  DANISHA output 200 cc  Continue Unasyn  I discussed with IR and ID  May change to oral antibiotics tomorrow  Patient will likely keep the drain at discharge    WBC 16>12       I have discussed this patient's plan of care and discharge plan at IDT rounds today with Case Management, Nursing, Nursing leadership, and other members of the IDT team.    Consultants/Specialty  infectious disease and IR    Code Status  Full Code    Disposition  The patient is not medically cleared for discharge to home or a post-acute facility.      I have placed the appropriate orders for post-discharge needs.    Review of Systems  Review of Systems   Constitutional:  Negative for fever and malaise/fatigue.   Respiratory:  Negative for shortness of breath.    Cardiovascular:  Negative for chest pain and leg swelling.   Gastrointestinal:  Negative for abdominal pain, nausea and vomiting.        Physical Exam  Temp:  [36.6 °C (97.8 °F)-36.9 °C  (98.5 °F)] 36.6 °C (97.9 °F)  Pulse:  [60-71] 71  Resp:  [16-19] 19  BP: (112-124)/(61-70) 117/67  SpO2:  [93 %-97 %] 93 %    Physical Exam  Vitals and nursing note reviewed.   Constitutional:       General: He is not in acute distress.     Appearance: Normal appearance. He is not ill-appearing.   Cardiovascular:      Rate and Rhythm: Normal rate and regular rhythm.   Pulmonary:      Effort: Pulmonary effort is normal.      Breath sounds: Normal breath sounds.   Abdominal:      Comments: DANISHA drain to epigastric region.  Serosanguineous drainage present   Skin:     General: Skin is warm and dry.   Neurological:      Mental Status: He is alert and oriented to person, place, and time. Mental status is at baseline.   Psychiatric:         Mood and Affect: Mood normal.         Behavior: Behavior normal.         Fluids    Intake/Output Summary (Last 24 hours) at 1/9/2025 173  Last data filed at 1/9/2025 0620  Gross per 24 hour   Intake --   Output 700 ml   Net -700 ml        Laboratory  Recent Labs     01/07/25  0024 01/08/25  0034 01/09/25  0525   WBC 17.6* 16.5* 12.8*   RBC 3.75* 3.68* 3.90*   HEMOGLOBIN 11.3* 11.3* 11.7*   HEMATOCRIT 35.5* 35.3* 36.9*   MCV 94.7 95.9 94.6   MCH 30.1 30.7 30.0   MCHC 31.8* 32.0* 31.7*   RDW 42.6 43.1 42.6   PLATELETCT 522* 529* 524*   MPV 8.2* 8.3* 8.1*     Recent Labs     01/08/25  0034 01/09/25  0525   SODIUM 138 139   POTASSIUM 3.7 4.1   CHLORIDE 102 103   CO2 27 27   GLUCOSE 97 96   BUN 14 16   CREATININE 0.75 0.73   CALCIUM 8.9 9.2                     Imaging  [id number]   Final Result         1.  Abscessogram demonstrating moderate size residual abscess cavity.      2. Exchange of the existing percutaneous 12 Macedonian drainage catheter for a new 14 Macedonian drainage catheter.      CT-ABDOMEN-PELVIS WITH   Final Result      1.  Probable mild decrease in size of abscess in the hepatic dome.   2.  Stable to slight decrease in size of tiny subcapsular fluid adjacent to segment 4.    3.  Mild decrease in small right pleural effusion and associated atelectasis.   4.  No other significant change.      EC-ECHOCARDIOGRAM COMPLETE W/O CONT   Final Result      IR-DRAINAGE-CATH EXCHANGE   Final Result      1.  Resolving right hepatic abscess.      2.  Successful locking loop catheter exchange.      CT-ABDOMEN WITH   Final Result         1. There are new small effusions and compressive atelectasis, right worse than left.   2. There has been interval percutaneous drainage of the hepatic abscess.   3. Small left renal stone.   4. Mild hepatosplenomegaly.   5. Diverticulosis.   6. 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               CT-DRAIN-LIVER ABSCESS-CYST   Final Result      1.  CT guided placement of a percutaneous drainage catheter in a right hepatic abscess.   2.  The current plan is to obtain a follow-up CT in 5-7 days..      CT-ABDOMEN-PELVIS WITH   Final Result   Addendum (preliminary) 1 of 1   These findings were discussed with JAYDON BEYER on 12/28/2024    9:12 PM.      Final      1.  Large irregular heterogeneous low-attenuation lesion in the RIGHT lobe liver superiorly measuring 5.6 cm, most suggestive of abscess.   2.  Prominent pericaval, portacaval and debora hepatis lymph nodes, inflammatory versus neoplastic.   3.  Mild RIGHT hydronephrosis without obstructing ureteral stone demonstrated.   4.  Small nonobstructing LEFT kidney stone.      DX-CHEST-PORTABLE (1 VIEW)   Final Result      1.  No acute cardiopulmonary disease.   2.  Probable RIGHT lung base Nipple shadows.  Consider confirmation with nonemergent followup chest x-ray with nipple markers and shallow oblique views.              Assessment/Plan  * Liver abscess- (present on admission)  Assessment & Plan  CT of the abdomen pelvis with contrast: Right liver lobe lesion 5.6 cm most suggestive of abscess.  S/p drain placement 12/29  Cultures growing strep intermedius  Repeat CT abdomen 1/2: Reidentified is  an area of ill-defined low-attenuation in the liver centrally. This measures 7.2 x 6.5 x 5.7 cm. There is a pigtail drainage catheter in this region on the current exam along with some new gas.  IR DANISHA drain exchange 1/3  WBC increased to 16.5 overnight  - Continue IV Unasyn  - ID following  - Repeat CMP and CBC in a.m.  - Continue drain management per IR recommendations  - Repeat CT abdomen with on 1/8; pending those results plan for either drain exchange or hepatobiliary surgery consult    Bacteremia  Assessment & Plan  Blood cultures positive for strep intermedius 12/28  Repeat blood cultures 1/1 NGTD  Echocardiogram without obvious vegetations  - Infectious diseases following  - Continue IV Unasyn    Elevated LFTs  Assessment & Plan  LFTs improving  Presumed secondary to liver abscess  Hepatitis panel negative  - Repeat CMP in a.m.    Hydronephrosis of right kidney  Assessment & Plan  Incidental finding on CT of the abdomen with contrast: Mild right hydronephrosis without obstructing ureteral stone.    Dyslipidemia- (present on admission)  Assessment & Plan  Will hold Lipitor due to elevated LFTs    Non-Hodgkin's lymphoma (HCC)- (present on admission)  Assessment & Plan  In remission for 4-5 years   per oncology note is low-grade, not on active treatments.    Continue  surveillance per Dr. Chen    ACP (advance care planning)  Assessment & Plan  Patient admitted with liver abscess.  History of non-Hodgkin lymphoma.  Age of 69.  I discussed the CODE STATUS with him and his  at bedside, including CPR, intubation/mechanical ventilation.  He wants to stay full code if there is no terminal diagnosis or mental inability.  Continue full code.  ACP 16 minutes.  Above per previous hospitalist          VTE prophylaxis: Lovenox    I have performed a physical exam and reviewed and updated ROS and Plan today (1/9/2025). In review of yesterday's note (1/8/2025), there are no changes except as documented above.      I  spent greater than 51 minutes for chart review, obtaining history independently, performing medically appropriate examination,  documenting , ordering medications, tests, or procedures, referring and communicating with other health care professionals, Independently interpreting results and communicating results with patient/family/caregiver. More than 50% of time was spent in face-to-face clinical encounter.   VTE Selection

## 2025-01-10 NOTE — DISCHARGE SUMMARY
Discharge Summary    CHIEF COMPLAINT ON ADMISSION  Chief Complaint   Patient presents with    Sent from Urgent Care      wbc 24, fever, chills. Called patient who requested antibiotics however at this time there is no clear source of infection. He has a history of non-Hodgkin's lymphoma and reports new lymphadenopathy. Further work up is needed as fevers ar on going with no known source of infection. Advised patient to go to  ED further evaluation; he verbalized understanding and intention to go to ED.    Fever     X 3 days now. Pt states taking advil for fever however it comes right back.     Urinary Frequency     Pt endorses urinary urgency  has gotten much worse the last 3/4 days        Reason for Admission  sent by      Admission Date  12/28/2024    CODE STATUS  Full Code    HPI & HOSPITAL COURSE  Jet Webb is a 69-year-old male with PMHx non-Hodgkin's lymphoma, nephrolithiasis, HLD.  Admitted 12/28 for right sided liver abscesses. Initially, patient presenting with fever, chills. CT of the abdomen pelvis with contrast: Right liver lobe lesion 5.6 cm most suggestive of abscess. Prominent pericaval, portacaval and debora hepatis lymph nodes. Mild right hydronephrosis without obstructing ureteral stones demonstrated. Patient was started on IV Zosyn.  IR was consulted.  Patient underwent CT-guided drainage on 12/29. Cultures growing strep intermedius. Blood cultures positive for strep intermedius 12/28.  Repeat CT abdomen 1/2: Reidentified is an area of ill-defined low-attenuation in the liver centrally. This measures 7.2 x 6.5 x 5.7 cm. There is a pigtail drainage catheter in this region on the current exam along with some new gas. DANISHA drain exchange 1/3. Continue IV Unasyn.  Persistent leukocytosis,  s/p Abscessogram with drain exchange/upsize 1/8.  Patient still has leukocytosis, clinically he has been improving. Repeat blood cultures 1/1 NGTD.  Echocardiogram without obvious vegetations. Case was discussed  with radiology and ID, patient is okay for discharge with the drain and to continue Augmentin.  He we will follow-up with ID clinic and repeat a CT. he was advised to come back to ER if he develops new fever chills, or worsening abdominal pain.  He agreed.   This    Therefore, he is discharged in good and stable condition to home with close outpatient follow-up.    The patient met 2-midnight criteria for an inpatient stay at the time of discharge.    Discharge Date  1/10/2025    FOLLOW UP ITEMS POST DISCHARGE  - Follow up with primary care physician in 1 week.   - Follow up with ID as instructed  - Please take the medications as instructed    Please come back to ER if you develop new fever chills or abdominal pain    - Go to the local Emergency Department if you have any worsening condition.     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      Followed by oncology, , continuing surveillance    Dyslipidemia (POA: Yes)      Overview: Chronic, LDL 36      Hdl slightly low at 39, so encouraged exercise.      Current regimen: Lipitor 10 mg nightly    Hydronephrosis of right kidney (POA: Unknown)    Elevated LFTs (POA: Unknown)    Bacteremia (POA: Unknown)    ACP (advance care planning) (POA: Unknown)  Resolved Problems:    * No resolved hospital problems. *      FOLLOW UP  Future Appointments   Date Time Provider Department Center   2/13/2025  9:00 AM Shar Rahman M.D. [facility] [place]   2/14/2025  8:00 AM Marcial Camacho, PT, DPT, OCS [facility] [place] [address]   6/12/2025  9:00 AM Eirk Chen D.O. [facility] None     Sukumar Bunn D.O.  [address] [facility] [address]  [phone]    Follow up in 1 week(s)  Please call your primary care provider to schedule, recent hospitalization follow up      MEDICATIONS ON  DISCHARGE     Medication List        START taking these medications        Instructions   amoxicillin-clavulanate 875-125 MG Tabs  Commonly known as: Augmentin   Take 1 Tablet by mouth 2 times a day for 30 days.  Dose: 1 Tablet            CONTINUE taking these medications        Instructions   acetaminophen 325 MG Tabs  Commonly known as: Tylenol   Take 650 mg by mouth every four hours as needed for Mild Pain or Fever. 325 mg x 2 tablets = 500 mg  Dose: 650 mg     acyclovir 800 MG Tabs  Commonly known as: Zovirax   Take 1 Tablet by mouth 2 times a day.  Dose: 800 mg     atorvastatin 10 MG Tabs  Commonly known as: Lipitor   Take 1 Tablet by mouth every evening.  Dose: 10 mg     Azelastine 137 MCG/SPRAY Soln  Commonly known as: Astelin   SPRAY TWO PUFFS INTO EACH NOSTRIL TWICE A DAY AS DIRECTED     ibuprofen 200 MG Tabs  Commonly known as: Motrin   Take 600 mg by mouth every 6 hours as needed for Mild Pain or Fever. 200 mg x 3 tablets = 600 mg  Dose: 600 mg     montelukast 10 MG Tabs  Commonly known as: Singulair   Take 1 Tablet by mouth every day.  Dose: 10 mg              Allergies  No Known Allergies    DIET  Orders Placed This Encounter   Procedures    Diet Order Diet: Regular     Standing Status:   Standing     Number of Occurrences:   1     Order Specific Question:   Diet:     Answer:   Regular [1]       ACTIVITY  As tolerated.  Weight bearing as tolerated    CONSULTATIONS  ID, IR    PROCEDURES  IR drain    LABORATORY  Lab Results   Component Value Date    SODIUM 138 01/10/2025    POTASSIUM 3.9 01/10/2025    CHLORIDE 104 01/10/2025    CO2 26 01/10/2025    GLUCOSE 98 01/10/2025    BUN 16 01/10/2025    CREATININE 0.90 01/10/2025        Lab Results   Component Value Date    WBC 14.8 (H) 01/10/2025    HEMOGLOBIN 11.9 (L) 01/10/2025    HEMATOCRIT 36.6 (L) 01/10/2025    PLATELETCT 549 (H) 01/10/2025        Total time of the discharge process exceeds 38 minutes.

## 2025-01-10 NOTE — CARE PLAN
Problem: Discharge Barriers/Planning  Goal: Patient's continuum of care needs are met  Outcome: Progressing     The patient is Stable - Low risk of patient condition declining or worsening    Shift Goals  Clinical Goals: drain management, IV ABX therapy  Patient Goals: Rest  Family Goals: JOESPH    Progress made toward(s) clinical / shift goals:        Patient is not progressing towards the following goals:

## 2025-01-10 NOTE — PROGRESS NOTES
Radiology Progress Note     Author: Danelle Wilson DNP Date & Time created: 1/10/2025  1:45 PM   Date of admission  12/28/2024  Note to reader: this note follows the APSO format rather than the historical SOAP format. Assessment and plan located at the top of the note for ease of use.    Chief Complaint  69 y.o. male admitted 12/28/2024 with   Chief Complaint   Patient presents with    Sent from Urgent Care      wbc 24, fever, chills. Called patient who requested antibiotics however at this time there is no clear source of infection. He has a history of non-Hodgkin's lymphoma and reports new lymphadenopathy. Further work up is needed as fevers ar on going with no known source of infection. Advised patient to go to  ED further evaluation; he verbalized understanding and intention to go to ED.    Fever     X 3 days now. Pt states taking advil for fever however it comes right back.     Urinary Frequency     Pt endorses urinary urgency  has gotten much worse the last 3/4 days        HPI  Jet Webb is a 69-year-old male with past medical history significant for non-Hodgkin's lymphoma, nephrolithiasis, and HLD admitted 12/28/2024 for CT finding of right hepatic fluid collection concerning for abscess after presenting to the ER with fevers and chills.  IR was consulted and patient underwent CT-guided liver abscess drain placement with IR Dr. Chandler on 12/29/2024    Interval History:   12/30/2024 -hepatic abscess 12F drain to RUQ with 140 mL slightly turbid serosanguinous output in the last 24 hours. Labs reviewed; WBC 20.8, H&H 11.5/33.9, , , alk phos 126, creatinine 0.84.  Hepatic fluid cultures pending. Drain flushed with 10 mL NS. I reviewed IDT notes and images.    12/31/24 - Hepatic abscess 12F drain to RUQ with 68 mL seropurulent output in the last 24 hours. Labs reviewed; WBC 16.5, H&H 11.1/33.0, AST 52, , alk phos 125, creatinine 0.73.  Hepatic fluid cultures preliminarily positive for  streptococcus intermedius. Drain flushed with 10 mL NS. I reviewed IDT notes and images and discussed patient with Dr. Griffni.     01/01/25 - Hepatic abscess 12F drain to RUQ with 50 mL serosanguinous output in the last 24 hours. Labs reviewed; WBC 17.0, H&H 11.8/35.8, AST 48, ALT 91, alk phos 149, creatinine 0.78.  Hepatic fluid cultures positive for streptococcus intermedius, on abx through ID. Drain flushed with 20 mL NS with 10 mLs able to be aspirated back. I reviewed IDT notes.     01/02/25 - Hepatic abscess 12F drain to RUQ with 27.5 mL serosanguinous output in the last 24 hours. Labs reviewed; WBC 18.6, H&H 11.3/34.0, AST 45, ALT 76, alk phos 161, creatinine 0.73.  Drain flushed with 20 mL NS with 10 mLs able to be aspirated back. I reviewed IDT notes and discussed POC with Dr Griffin.     01/03/25 - Hepatic abscess 12F drain to RUQ with 10 mL serosanguinous output in the last 24 hours. Labs reviewed; WBC 20.4, H&H 11.0/34.4, AST 23, ALT 54, alk phos 139, creatinine 0.66.  I reviewed IDT notes and discussed POC with Dr Griffin.     01/04/25 - Hepatic abscess 12F drain to RUQ with 1.5 mL serosanguinous output in the last 24 hours. Labs reviewed; WBC 16.7, H&H 10.8/33.7, AST 22, ALT 45, alk phos 135, creatinine 0.62.  I reviewed IDT notes, imaging from recent procedure, and discussed POC with Dr Griffin.     01/05/25 - Hepatic abscess 12F drain to RUQ with no output recorded in the last 24 hours. Labs reviewed; WBC 15.3, H&H 11.1/33.6, AST 23, ALT 42, alk phos 132, creatinine 0.62.  I reviewed IDT notes, and discussed POC with Dr Griffin and Dr Pickard, ID.    1/6/2025: RUQ hepatic abscess drain (12F) to bulb suction with 20 mL turbid tan fluid out in the last 24 hours.  Irrigated drain with 10 mL sterile saline with 10 mL turbid return.  Patient HAYDEN to PPTE, seated in bed, noting that he feels pretty good, is just bored.  Patient denies abdominal pain, change in discomfort with irrigation, nausea, or  "vomiting.  I reviewed patient's most recent labs including WBC 16.5<15.3, Hgb 11.8, CR 0.74.  Coordinated post IR procedure care with patient, hospitalist, and hospital nursing staff.    1/7/2025: RUQ hepatic abscess drain (12F) to bulb suction with 20 mL serous fluid out in the last 24 hours.  Irrigated drain with 10 mL sterile saline with 4 mL of serous return.  Patient HAYDEN to PPTE, seated in bed, awaiting pending CT.  He denies abdominal pain, nausea, or vomiting.  I reviewed patient's most recent labs including WC BC 17.6<16.5 (continuing to trend upward), Hgb 11.3, CR 0.74.  Coordinated post IR procedure care with patient, hospitalist, and hospital nursing staff.    1/8/2025: IR Dr. Chandler completed an abscessogram with drain exchange/upsize (14F).    1/9/2025: RUQ hepatic abscess drain (14F) to bulb suction with 200 mL turbid serosanguineous fluid out in the last 24 hours.  Irrigated drain with 10 mL sterile saline with 6 mL turbid serosanguineous return.  Demonstration and teach back for patient and his spouse-all questions answered.  Drain care and maintenance supplies available.  Patient is HAYDEN to P PTE, seated in bed, noting he \"feels good\", although a little restless.  No abdominal pain, discomfort with irrigation, nausea or vomiting noted.  I reviewed patient's most recent labs including WBC 12.8<16.5, Hgb 11.7, CR 0.73.  Coordinated post IR procedure care with patient, spouse, interventional radiologist, ID, hospitalist, and hospital nursing staff.    1/10/2025: RUQ hepatic abscess drain (14F) to bulb suction with 15 mL turbid serous fluid out in the last 24 hours.  Patient demonstrated teach back drain irrigation with 10 mL sterile saline, 6 mL turbid return.-All questions answered.  Patient is HAYDEN to PPTE, seated in hospital bed.  He states he is ready to go home and confident with home drain care and maintenance.  Spouse is also confident and feels that all of his questions have been answered.  I " reviewed patient's most recent labs including WBC 14.8<12.8, Hgb 11.9, CR 0.90.  Coordinated post IR procedure care with patient, spouse, hospitalist, ID, and hospital nursing staff.    Assessment/Plan     Principal Problem:    Liver abscess  Active Problems:    Non-Hodgkin's lymphoma (HCC)    Dyslipidemia    Hydronephrosis of right kidney    Elevated LFTs    ACP (advance care planning)    Bacteremia      Plan IR  - Pending discharge home (1/10/2025) with drain in place  - Liver abscess fluid cultures positive for Streptococcus intermedius  - Antimicrobials per ID recommendation  - ID following    - Outpatient follow-up with infectious disease clinic  - Drain care education and flushing materials provided to patient  - Continue to monitor drains, VS, and labs      -Thank you for allowing Interventional Radiology team to participate in the patients care, if any additional care or requests are needed in the future please do not hesitate to call or place IR order           Review of Systems  Physical Exam   Review of Systems   Constitutional:  Negative for chills and fever.   Respiratory:  Negative for cough and shortness of breath.    Cardiovascular:  Negative for chest pain.   Gastrointestinal:  Negative for abdominal pain, nausea and vomiting.   Musculoskeletal:  Negative for myalgias.   Neurological:  Negative for weakness.      Vitals:    01/10/25 0831   BP: 121/63   Pulse: 65   Resp: 18   Temp: 36.4 °C (97.5 °F)   SpO2: 95%        Physical Exam  Vitals and nursing note reviewed.   Constitutional:       General: He is not in acute distress.     Appearance: He is not ill-appearing.   HENT:      Head: Atraumatic.   Cardiovascular:      Rate and Rhythm: Normal rate.      Pulses: Normal pulses.   Pulmonary:      Effort: Pulmonary effort is normal. No respiratory distress.   Abdominal:      General: There is no distension.      Tenderness: There is no abdominal tenderness.      Comments: IR drain to RUQ    Musculoskeletal:      Right lower leg: No edema.      Left lower leg: No edema.   Skin:     General: Skin is warm and dry.   Neurological:      General: No focal deficit present.      Mental Status: He is alert and oriented to person, place, and time.   Psychiatric:         Mood and Affect: Mood normal.         Behavior: Behavior normal.             Labs    Recent Labs     01/08/25  0034 01/09/25  0525 01/10/25  0237   WBC 16.5* 12.8* 14.8*   RBC 3.68* 3.90* 3.91*   HEMOGLOBIN 11.3* 11.7* 11.9*   HEMATOCRIT 35.3* 36.9* 36.6*   MCV 95.9 94.6 93.6   MCH 30.7 30.0 30.4   MCHC 32.0* 31.7* 32.5   RDW 43.1 42.6 42.9   PLATELETCT 529* 524* 549*   MPV 8.3* 8.1* 8.2*     Recent Labs     01/08/25  0034 01/09/25  0525 01/10/25  0237   SODIUM 138 139 138   POTASSIUM 3.7 4.1 3.9   CHLORIDE 102 103 104   CO2 27 27 26   GLUCOSE 97 96 98   BUN 14 16 16   CREATININE 0.75 0.73 0.90   CALCIUM 8.9 9.2 9.1     Recent Labs     01/08/25  0034 01/09/25  0525 01/10/25  0237   ALBUMIN 3.1* 3.3 3.4   TBILIRUBIN 0.3 0.4 0.3   ALKPHOSPHAT 126* 128* 125*   TOTPROTEIN 5.9* 6.1 6.3   ALTSGPT 34 30 23   ASTSGOT 22 18 18   CREATININE 0.75 0.73 0.90     [id number]-GRAM   Final Result         1.  Abscessogram demonstrating moderate size residual abscess cavity.      2. Exchange of the existing percutaneous 12 [place] drainage catheter for a new 14 [place] drainage catheter.      CT-ABDOMEN-PELVIS WITH   Final Result      1.  Probable mild decrease in size of abscess in the hepatic dome.   2.  Stable to slight decrease in size of tiny subcapsular fluid adjacent to segment 4.   3.  Mild decrease in small right pleural effusion and associated atelectasis.   4.  No other significant change.      EC-ECHOCARDIOGRAM COMPLETE W/O CONT   Final Result      IR-DRAINAGE-CATH EXCHANGE   Final Result      1.  Resolving right hepatic abscess.      2.  Successful locking loop catheter exchange.      CT-ABDOMEN WITH   Final Result         1. There are new small  "effusions and compressive atelectasis, right worse than left.   2. There has been interval percutaneous drainage of the hepatic abscess.   3. Small left renal stone.   4. Mild hepatosplenomegaly.   5. Diverticulosis.   6. 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               CT-DRAIN-LIVER ABSCESS-CYST   Final Result      1.  CT guided placement of a percutaneous drainage catheter in a right hepatic abscess.   2.  The current plan is to obtain a follow-up CT in 5-7 days..      CT-ABDOMEN-PELVIS WITH   Final Result   Addendum (preliminary) 1 of 1   These findings were discussed with JAYDON BEYER on 12/28/2024    9:12 PM.      Final      1.  Large irregular heterogeneous low-attenuation lesion in the RIGHT lobe liver superiorly measuring 5.6 cm, most suggestive of abscess.   2.  Prominent pericaval, portacaval and debora hepatis lymph nodes, inflammatory versus neoplastic.   3.  Mild RIGHT hydronephrosis without obstructing ureteral stone demonstrated.   4.  Small nonobstructing LEFT kidney stone.      DX-CHEST-PORTABLE (1 VIEW)   Final Result      1.  No acute cardiopulmonary disease.   2.  Probable RIGHT lung base Nipple shadows.  Consider confirmation with nonemergent followup chest x-ray with nipple markers and shallow oblique views.           INR   Date Value Ref Range Status   12/29/2024 1.37 (H) 0.87 - 1.13 Final     Comment:     INR - Non-therapeutic Reference Range: 0.87-1.13  INR - Therapeutic Reference Range: 2.0-4.0       No results found for: \"POCINR\"     Intake/Output Summary (Last 24 hours) at 12/30/2024 1036  Last data filed at 12/30/2024 0948  Gross per 24 hour   Intake 3080 ml   Output 1690 ml   Net 1390 ml      I have personally reviewed the above labs and imaging      I have performed a physical exam and reviewed and updated ROS and Plan today (1/10/2025).     39 minutes in directly providing and coordinating care and extensive data review.  No time overlap and excludes " procedures.

## 2025-01-10 NOTE — PROGRESS NOTES
Discharge orders received.  Patient arrived to the discharge lounge.  PIV removed.  Instructions given, medications reviewed and general discharge education provided to patient.  Follow up appointments discussed.  Patient verbalized understanding of dc instructions and prescriptions.  Patient signed discharge instructions.  Patient verbalized understanding had all belongings with him.  Patient left with meds to beds and family member. Wished patient a speedy recovery.

## 2025-01-13 ENCOUNTER — HOSPITAL ENCOUNTER (OUTPATIENT)
Dept: LAB | Facility: MEDICAL CENTER | Age: 70
End: 2025-01-13
Attending: STUDENT IN AN ORGANIZED HEALTH CARE EDUCATION/TRAINING PROGRAM
Payer: MEDICARE

## 2025-01-13 DIAGNOSIS — K75.0 LIVER ABSCESS: ICD-10-CM

## 2025-01-13 LAB
ALBUMIN SERPL BCP-MCNC: 4.1 G/DL (ref 3.2–4.9)
ALBUMIN/GLOB SERPL: 1.4 G/DL
ALP SERPL-CCNC: 128 U/L (ref 30–99)
ALT SERPL-CCNC: 27 U/L (ref 2–50)
ANION GAP SERPL CALC-SCNC: 13 MMOL/L (ref 7–16)
AST SERPL-CCNC: 18 U/L (ref 12–45)
BASOPHILS # BLD AUTO: 0.8 % (ref 0–1.8)
BASOPHILS # BLD: 0.09 K/UL (ref 0–0.12)
BILIRUB SERPL-MCNC: 0.4 MG/DL (ref 0.1–1.5)
BUN SERPL-MCNC: 17 MG/DL (ref 8–22)
CALCIUM ALBUM COR SERPL-MCNC: 9.2 MG/DL (ref 8.5–10.5)
CALCIUM SERPL-MCNC: 9.3 MG/DL (ref 8.5–10.5)
CHLORIDE SERPL-SCNC: 102 MMOL/L (ref 96–112)
CO2 SERPL-SCNC: 24 MMOL/L (ref 20–33)
CREAT SERPL-MCNC: 0.72 MG/DL (ref 0.5–1.4)
EOSINOPHIL # BLD AUTO: 0.07 K/UL (ref 0–0.51)
EOSINOPHIL NFR BLD: 0.6 % (ref 0–6.9)
ERYTHROCYTE [DISTWIDTH] IN BLOOD BY AUTOMATED COUNT: 43.5 FL (ref 35.9–50)
GFR SERPLBLD CREATININE-BSD FMLA CKD-EPI: 97 ML/MIN/1.73 M 2
GLOBULIN SER CALC-MCNC: 2.9 G/DL (ref 1.9–3.5)
GLUCOSE SERPL-MCNC: 86 MG/DL (ref 65–99)
HCT VFR BLD AUTO: 40.3 % (ref 42–52)
HGB BLD-MCNC: 13.1 G/DL (ref 14–18)
IMM GRANULOCYTES # BLD AUTO: 0.05 K/UL (ref 0–0.11)
IMM GRANULOCYTES NFR BLD AUTO: 0.4 % (ref 0–0.9)
LYMPHOCYTES # BLD AUTO: 4.04 K/UL (ref 1–4.8)
LYMPHOCYTES NFR BLD: 35.4 % (ref 22–41)
MCH RBC QN AUTO: 31 PG (ref 27–33)
MCHC RBC AUTO-ENTMCNC: 32.5 G/DL (ref 32.3–36.5)
MCV RBC AUTO: 95.5 FL (ref 81.4–97.8)
MONOCYTES # BLD AUTO: 1.3 K/UL (ref 0–0.85)
MONOCYTES NFR BLD AUTO: 11.4 % (ref 0–13.4)
NEUTROPHILS # BLD AUTO: 5.85 K/UL (ref 1.82–7.42)
NEUTROPHILS NFR BLD: 51.4 % (ref 44–72)
NRBC # BLD AUTO: 0 K/UL
NRBC BLD-RTO: 0 /100 WBC (ref 0–0.2)
PLATELET # BLD AUTO: 536 K/UL (ref 164–446)
PMV BLD AUTO: 8.5 FL (ref 9–12.9)
POTASSIUM SERPL-SCNC: 4.5 MMOL/L (ref 3.6–5.5)
PROT SERPL-MCNC: 7 G/DL (ref 6–8.2)
RBC # BLD AUTO: 4.22 M/UL (ref 4.7–6.1)
SODIUM SERPL-SCNC: 139 MMOL/L (ref 135–145)
WBC # BLD AUTO: 11.4 K/UL (ref 4.8–10.8)

## 2025-01-13 PROCEDURE — 36415 COLL VENOUS BLD VENIPUNCTURE: CPT

## 2025-01-13 PROCEDURE — 80053 COMPREHEN METABOLIC PANEL: CPT

## 2025-01-13 PROCEDURE — 85025 COMPLETE CBC W/AUTO DIFF WBC: CPT

## 2025-01-14 ENCOUNTER — TELEPHONE (OUTPATIENT)
Dept: INFECTIOUS DISEASES | Facility: MEDICAL CENTER | Age: 70
End: 2025-01-14

## 2025-01-14 ENCOUNTER — OFFICE VISIT (OUTPATIENT)
Dept: INTERNAL MEDICINE | Facility: OTHER | Age: 70
End: 2025-01-14
Payer: MEDICARE

## 2025-01-14 VITALS
DIASTOLIC BLOOD PRESSURE: 60 MMHG | HEIGHT: 68 IN | WEIGHT: 151 LBS | SYSTOLIC BLOOD PRESSURE: 108 MMHG | HEART RATE: 71 BPM | TEMPERATURE: 98 F | OXYGEN SATURATION: 98 % | BODY MASS INDEX: 22.88 KG/M2

## 2025-01-14 DIAGNOSIS — K75.0 LIVER ABSCESS: ICD-10-CM

## 2025-01-14 DIAGNOSIS — Z09 HOSPITAL DISCHARGE FOLLOW-UP: ICD-10-CM

## 2025-01-14 DIAGNOSIS — D72.829 LEUKOCYTOSIS, UNSPECIFIED TYPE: ICD-10-CM

## 2025-01-14 PROCEDURE — 3078F DIAST BP <80 MM HG: CPT | Mod: GC

## 2025-01-14 PROCEDURE — 99213 OFFICE O/P EST LOW 20 MIN: CPT | Mod: GE

## 2025-01-14 PROCEDURE — 3074F SYST BP LT 130 MM HG: CPT | Mod: GC

## 2025-01-14 ASSESSMENT — ENCOUNTER SYMPTOMS
GASTROINTESTINAL NEGATIVE: 1
PHOTOPHOBIA: 0
PALPITATIONS: 0
DEPRESSION: 0
BACK PAIN: 0
CONSTIPATION: 0
CONSTITUTIONAL NEGATIVE: 1
BLOOD IN STOOL: 0
BLURRED VISION: 0
SPUTUM PRODUCTION: 0
TINGLING: 0
VOMITING: 0
CARDIOVASCULAR NEGATIVE: 1
EYES NEGATIVE: 1
WEIGHT LOSS: 0
NAUSEA: 0
ORTHOPNEA: 0
NECK PAIN: 0
ABDOMINAL PAIN: 0
DIZZINESS: 0
FEVER: 0
HEMOPTYSIS: 0
HEARTBURN: 0
CHILLS: 0
DOUBLE VISION: 0
MUSCULOSKELETAL NEGATIVE: 1
NEUROLOGICAL NEGATIVE: 1
DIARRHEA: 0
MYALGIAS: 0
RESPIRATORY NEGATIVE: 1
HEADACHES: 0
COUGH: 0
PSYCHIATRIC NEGATIVE: 1

## 2025-01-14 ASSESSMENT — FIBROSIS 4 INDEX: FIB4 SCORE: 0.46

## 2025-01-14 ASSESSMENT — LIFESTYLE VARIABLES: SUBSTANCE_ABUSE: 0

## 2025-01-14 NOTE — TELEPHONE ENCOUNTER
Spoke with patient and scheduled hospital follow up for next week after the repeat CT scan 1/23/25 patient reports drain output has decreased and wants to confirm that this is ok or speak with physician about the drain, message forwarded

## 2025-01-14 NOTE — PATIENT INSTRUCTIONS
Thanks for coming in today!    - Please follow up with infectious disease recommendations  - Ct next Wednesday, Apt. w/ ID next Thursday.    Hope you continue to feel better!

## 2025-01-14 NOTE — PROGRESS NOTES
ESTABLISHED PATIENT VISIT    PATIENT ID:  Name: Jet Webb  YOB: 1955  Patient Care Team:  Sukumar Bunn D.O. as PCP - General (Internal Medicine)  Erik Chen D.O. (Hematology & Oncology)  Marcial Camacho, PT, DPT, OCS as Physical Therapist (Physical Therapy)    CHIEF COMPLAINT(s):  Hospital Follow-up    Follow up for There were no encounter diagnoses.    History of Present Illness:    This is a pleasant 69 y.o. male clinic patient established with my colleague Dr. Bunn who presents today for a hospital follow-up. non-Hodgkin lymphoma being monitored by oncology, normocytic anemia, allergies, asthma, paroxysmal atrial fibrillation ([id number]), recurrent cold sores being managed by acyclovir, mild aortic stenosis. Hyperlipidemia, atorvastatin 10 mg.  He takes montelukast 10 mg daily and azelastine nasal spray for allergies and congestion.     Patient was hospitalized on 12/28 after being found to have a high blood blood cell count in urgent care clinic. CT scan in emergency department revealed 5.2 cm liver abscess. Patient was treated with IR drainage and IV antibiotics. Long-term drain was placed and patient was transitioned to oral antibiotics upon discharge.  Patient has repeat CT scan scheduled for next Wednesday, appointment with outpatient ID scheduled for next Thursday. Patient states that he is overall feeling much better.  Denies fever/chills/night sweats. Reports normal bowel habits without excessive diarrhea/constipation/nausea/vomiting.  Does not note any recent purulence from the drainage. Drain site is nonerythematous and does not show any purulent discharge.  About 5 mL of serous fluid noted in drain.  Patient denies any abdominal pain. States his drain is mildly uncomfortable.    Patient Active Problem List    Diagnosis Date Noted    Bacteremia 01/01/2025    ACP (advance care planning) 12/29/2024    Liver abscess 12/28/2024    Hydronephrosis of right kidney  12/28/2024    Elevated LFTs 12/28/2024    Chin laceration 11/14/2024    Elevated blood pressure reading 11/14/2024    Right hip pain 11/11/2024    Accident due to mechanical fall without injury 11/11/2024    BMI 25.0-25.9,adult 06/12/2024    Elevated alkaline phosphatase level 06/12/2024    Normocytic anemia 06/12/2024    Mild aortic valve sclerosis 05/16/2024    Non-Hodgkin's lymphoma (HCC) 05/13/2024    History of kidney stones 05/13/2024    Oral herpes 05/13/2024    Environmental allergies 05/13/2024    Cardiac arrhythmia 05/13/2024    Dyslipidemia 05/13/2024     Review of Systems   Constitutional: Negative.  Negative for chills, fever and weight loss.   HENT: Negative.  Negative for ear pain, hearing loss and tinnitus.    Eyes: Negative.  Negative for blurred vision, double vision and photophobia.   Respiratory: Negative.  Negative for cough, hemoptysis and sputum production.    Cardiovascular: Negative.  Negative for chest pain, palpitations and orthopnea.   Gastrointestinal: Negative.  Negative for abdominal pain, blood in stool, constipation, diarrhea, heartburn, nausea and vomiting.   Genitourinary:  Negative for dysuria, frequency and hematuria.   Musculoskeletal: Negative.  Negative for back pain, myalgias and neck pain.   Skin: Negative.  Negative for rash.   Neurological: Negative.  Negative for dizziness, tingling and headaches.   Endo/Heme/Allergies: Negative.  Negative for environmental allergies.   Psychiatric/Behavioral: Negative.  Negative for depression, substance abuse and suicidal ideas.      Past Medical History:   Diagnosis Date    Cancer (HCC)     Lymphoma (HCC)      Past Surgical History:   Procedure Laterality Date    APPENDECTOMY      ARTHROPLASTY       Family History   Problem Relation Age of Onset    Heart Disease Father     Cancer Neg Hx      Patient has no known allergies.  Social History     Tobacco Use    Smoking status: Never    Smokeless tobacco: Never   Vaping Use    Vaping  "status: Never Used   Substance Use Topics    Alcohol use: Yes     Comment: ocassional    Drug use: Never     Current Outpatient Medications   Medication Sig Dispense Refill    amoxicillin-clavulanate (AUGMENTIN) 875-125 MG Tab Take 1 Tablet by mouth 2 times a day for 30 days. 60 Tablet 0    ibuprofen (MOTRIN) 200 MG Tab Take 600 mg by mouth every 6 hours as needed for Mild Pain or Fever. 200 mg x 3 tablets = 600 mg      acetaminophen (TYLENOL) 325 MG Tab Take 650 mg by mouth every four hours as needed for Mild Pain or Fever. 325 mg x 2 tablets = 500 mg      acyclovir (ZOVIRAX) 800 MG Tab Take 1 Tablet by mouth 2 times a day. (Patient taking differently: Take 800 mg by mouth every evening.) 200 Tablet 2    atorvastatin (LIPITOR) 10 MG Tab Take 1 Tablet by mouth every evening. 100 Tablet 2    Azelastine (ASTELIN) 137 MCG/SPRAY Solution SPRAY TWO PUFFS INTO EACH NOSTRIL TWICE A DAY AS DIRECTED 90 mL 2    montelukast (SINGULAIR) 10 MG Tab Take 1 Tablet by mouth every day. 100 Tablet 2     No current facility-administered medications for this visit.     OBJECTIVE:  /60 (BP Location: Left arm, Patient Position: Sitting, BP Cuff Size: Adult)   Pulse 71   Temp 36.7 °C (98 °F) (Temporal)   Ht 1.727 m (5' 8\")   Wt 68.5 kg (151 lb)   SpO2 98%   BMI 22.96 kg/m²   Physical Exam  Constitutional:       Appearance: Normal appearance. He is normal weight.   HENT:      Head: Normocephalic.      Right Ear: External ear normal.      Left Ear: External ear normal.      Nose: Nose normal.      Mouth/Throat:      Mouth: Mucous membranes are moist.   Eyes:      Pupils: Pupils are equal, round, and reactive to light.   Cardiovascular:      Rate and Rhythm: Normal rate and regular rhythm.      Pulses: Normal pulses.      Heart sounds: Normal heart sounds.   Pulmonary:      Effort: Pulmonary effort is normal.      Breath sounds: Normal breath sounds.   Abdominal:      General: Abdomen is flat. Bowel sounds are normal. There is no " distension.      Palpations: Abdomen is soft. There is no mass.      Tenderness: There is no abdominal tenderness. There is no guarding.      Comments: Drain intact.  No erythema or purulence at the site of drainage.  Output about 5 mL of serous fluid.  States that he recently flushed the drain before coming in for appointment.  Output has been slowly reducing over time.  ID team aware.   Musculoskeletal:         General: No swelling or tenderness. Normal range of motion.   Skin:     General: Skin is warm.      Capillary Refill: Capillary refill takes less than 2 seconds.   Neurological:      General: No focal deficit present.      Mental Status: He is alert. Mental status is at baseline.   Psychiatric:         Mood and Affect: Mood normal.         Behavior: Behavior normal.         Thought Content: Thought content normal.         Judgment: Judgment normal.     ASSESSMENT AND PLAN:     1. Hospital discharge follow-up  2. Liver abscess  3. Leukocytosis, unspecified  Patient was hospitalized on 12/28 after being found to have a high blood blood cell count in urgent care clinic. CT scan in emergency department revealed 5.2 cm liver abscess. Patient was treated with IR drainage and IV antibiotics. Long-term drain was placed and patient was transitioned to oral antibiotics upon discharge. Patient states that he is overall feeling much better.  Denies fever/chills/night sweats. Reports normal bowel habits without excessive diarrhea/constipation/nausea/vomiting.  Does not note any recent purulence from the drainage. Drain site is nonerythematous and does not show any purulent discharge. Leukocytosis continuing to resolve.  - repeat CT scan scheduled for next Wednesday, appointment with outpatient ID scheduled for next Thursday.   - continue to follow ID recs  - continue regular drain hygiene   - strict ED precautions in the setting of new fever, purulent drainage, or abdominal pain.    Problem List Items Addressed This  Visit    None    No orders of the defined types were placed in this encounter.    Return in about 30 days (around 2/13/2025) for With Dr. Rahman.    Jac Thompson M.D. PGY-1  [facility] Resident

## 2025-01-15 ENCOUNTER — OFFICE VISIT (OUTPATIENT)
Dept: INFECTIOUS DISEASES | Facility: MEDICAL CENTER | Age: 70
End: 2025-01-15
Attending: NURSE PRACTITIONER
Payer: MEDICARE

## 2025-01-15 ENCOUNTER — TELEPHONE (OUTPATIENT)
Dept: INFECTIOUS DISEASES | Facility: MEDICAL CENTER | Age: 70
End: 2025-01-15
Payer: MEDICARE

## 2025-01-15 VITALS
RESPIRATION RATE: 18 BRPM | BODY MASS INDEX: 22.58 KG/M2 | OXYGEN SATURATION: 97 % | DIASTOLIC BLOOD PRESSURE: 60 MMHG | WEIGHT: 149 LBS | TEMPERATURE: 97.5 F | HEART RATE: 71 BPM | SYSTOLIC BLOOD PRESSURE: 110 MMHG | HEIGHT: 68 IN

## 2025-01-15 DIAGNOSIS — K75.0 LIVER ABSCESS: ICD-10-CM

## 2025-01-15 DIAGNOSIS — R78.81 BACTEREMIA: ICD-10-CM

## 2025-01-15 DIAGNOSIS — A49.1 STREPTOCOCCUS INFECTION: ICD-10-CM

## 2025-01-15 DIAGNOSIS — C82.01 GRADE 1 FOLLICULAR LYMPHOMA OF LYMPH NODES OF NECK (HCC): ICD-10-CM

## 2025-01-15 PROCEDURE — 3078F DIAST BP <80 MM HG: CPT | Performed by: NURSE PRACTITIONER

## 2025-01-15 PROCEDURE — 99215 OFFICE O/P EST HI 40 MIN: CPT | Performed by: NURSE PRACTITIONER

## 2025-01-15 PROCEDURE — 3074F SYST BP LT 130 MM HG: CPT | Performed by: NURSE PRACTITIONER

## 2025-01-15 PROCEDURE — 99212 OFFICE O/P EST SF 10 MIN: CPT | Performed by: NURSE PRACTITIONER

## 2025-01-15 ASSESSMENT — ENCOUNTER SYMPTOMS
MYALGIAS: 0
PALPITATIONS: 0
BLURRED VISION: 0
DIZZINESS: 0
VOMITING: 0
WEIGHT LOSS: 0
DIARRHEA: 0
NERVOUS/ANXIOUS: 0
BRUISES/BLEEDS EASILY: 0
FEVER: 0
NAUSEA: 0
CONSTIPATION: 0
HEADACHES: 0
ABDOMINAL PAIN: 0
FOCAL WEAKNESS: 0
SPUTUM PRODUCTION: 0
WHEEZING: 0
CHILLS: 0
DOUBLE VISION: 0
COUGH: 0
SHORTNESS OF BREATH: 0

## 2025-01-15 ASSESSMENT — FIBROSIS 4 INDEX: FIB4 SCORE: 0.46

## 2025-01-15 NOTE — PROGRESS NOTES
INFECTIOUS  DISEASE  OUTPATIENT CLINIC  NOTE   Subjective   Primary care provider: Sukumar Bunn D.O..     Reason for Follow Up:   Follow-up for   1. Liver abscess  CBC WITH DIFFERENTIAL    Comp Metabolic Panel      2. Bacteremia        3. 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 Streptococcus infection            HPI: Patient previously seen and treated by ID team as inpatient during hospital admission.   Jet Webb is a 69-year-old male patient with history of non-Hodgkin's lymphoma currently on surveillance, history of nephrolithiasis, hyperlipidemia, presented with fevers and chills with a CT scan consistent with a 5.6 cm right liver abscess. Patient underwent CT-guided drainage on 12/29, repeat CT scan with no improvement so drain exchange performed by IR on 1/3. Blood cultures x 2 from 12/28 and IR drain cultures positive for Strep intermedius. Patient did have dental work done, 3 crowns, before Thanksgiving. No dental abscesses, no current toothache. Repeat CT scan continue to demonstrate very slow improvement in size of abscess.  IR performed drain upsize 1/8. TTE with no obvious valvular vegetations, pulmonic valve not well-visualized, tricuspid valve with some myxomatous changes with moderate TR. Not persistently bacteremic so avoiding CASANDRA.  Patient was discharged on a 8-week course of p.o. Augmentin 875/125 mg twice daily with plans for repeat CT scan in 2 weeks.    01/15/25- Today Patient reports feeling well.  Denies feeling generally ill, fevers/chills, general malaise, headache, n/v/d.  He reports he has been tolerating Augmentin with no complaints of side effects.  Liver abscess drain with minimal output 4-5 mL every 24 hours.  Mostly saline flush.  Pending repeat CT scan scheduled for 1/22/2025.  Due to slow progress of liver abscess we will increase Augmentin to 3 times daily.  He will continue to take daily probiotic.  Most recent labs reviewed from 1/13/2025,  WBC mildly elevated 11.4, elevated platelets 536, stable anemia slowly improving 13.1/40.3, CMP mostly unremarkable.  No signs of medication toxicity.  The patient reports that liver drain has had some fluid excrete out along the edges of tubing when flushing.  Decrease drain irrigation to 5 mL twice daily.  Highly likelihood that abscess is nearly resolved    Current Antimicrobials: Augmentin 875/125 mg 3 times daily, 8-week course with end date 2/9/2025  Previous Antimicrobials:    Other Current Medications:  Home Medications    Medication Sig Taking? Last Dose Authorizing Provider   amoxicillin-clavulanate (AUGMENTIN) 875-125 MG Tab Take 1 Tablet by mouth 2 times a day for 30 days. Yes Taking Kate Sales M.D.   ibuprofen (MOTRIN) 200 MG Tab Take 600 mg by mouth every 6 hours as needed for Mild Pain or Fever. 200 mg x 3 tablets = 600 mg Yes Taking Physician Outpatient   acetaminophen (TYLENOL) 325 MG Tab Take 650 mg by mouth every four hours as needed for Mild Pain or Fever. 325 mg x 2 tablets = 500 mg Yes Taking Physician Outpatient   acyclovir (ZOVIRAX) 800 MG Tab Take 1 Tablet by mouth 2 times a day.  Patient taking differently: Take 800 mg by mouth every evening. Yes Taking Sukumar Bunn D.O.   atorvastatin (LIPITOR) 10 MG Tab Take 1 Tablet by mouth every evening. Yes Taking Sukumar Bunn D.O.   Azelastine (ASTELIN) 137 MCG/SPRAY Solution SPRAY TWO PUFFS INTO EACH NOSTRIL TWICE A DAY AS DIRECTED Yes Taking Sukumar Bunn D.O.   montelukast (SINGULAIR) 10 MG Tab Take 1 Tablet by mouth every day. Yes Taking Sukumar Bunn D.O.        PMH:  Past Medical History:   Diagnosis Date    Cancer (HCC)     Lymphoma (HCC)      Past Surgical History:   Procedure Laterality Date    APPENDECTOMY      ARTHROPLASTY       Family History   Problem Relation Age of Onset    Heart Disease Father     Cancer Neg Hx      Social History     Socioeconomic History    Marital status:      Spouse name: Not on file    Number of children:  Not on file    Years of education: Not on file    Highest education level: Not on file   Occupational History    Not on file   Tobacco Use    Smoking status: Never    Smokeless tobacco: Never   Vaping Use    Vaping status: Never Used   Substance and Sexual Activity    Alcohol use: Yes     Comment: ocassional    Drug use: Never    Sexual activity: Not on file   Other Topics Concern    Not on file   Social History Narrative     to his , recently moved from [place]. 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      Social Drivers of Health     Financial Resource Strain: Low Risk  (7/18/2024)    Overall Financial Resource Strain (CARDIA)     Difficulty of Paying Living Expenses: Not hard at all   Food Insecurity: No Food Insecurity (12/28/2024)    Hunger Vital Sign     Worried About Running Out of Food in the Last Year: Never true     Ran Out of Food in the Last Year: Never true   Transportation Needs: No Transportation Needs (12/28/2024)    PRAPARE - Transportation     Lack of Transportation (Medical): No     Lack of Transportation (Non-Medical): No   Physical Activity: Sufficiently Active (12/1/2023)    Received from [facility]    Exercise Vital Sign     Days of Exercise per Week: 7 days     Minutes of Exercise per Session: 30 min   Stress: No Stress Concern Present (12/1/2023)    Received from [facility]    [place] [employer] - Occupational Stress Questionnaire     Feeling of Stress : Not at all   Social Connections: Moderately Integrated (12/1/2023)    Received from [facility]    Social Connection and Isolation Panel [NHANES]     Frequency of Communication with Friends and Family: More than three times a week     Frequency of Social Gatherings with Friends and Family: Three times a week     Attends Confucianism Services: Never     Active Member of Clubs or Organizations: Yes     Attends Club or Organization Meetings: More than  "4 times per year     Marital Status:    Intimate Partner Violence: Not At Risk (12/28/2024)    Humiliation, Afraid, Rape, and Kick questionnaire     Fear of Current or Ex-Partner: No     Emotionally Abused: No     Physically Abused: No     Sexually Abused: No   Housing Stability: Low Risk  (12/28/2024)    Housing Stability Vital Sign     Unable to Pay for Housing in the Last Year: No     Number of Times Moved in the Last Year: 0     Homeless in the Last Year: No           Allergies/Intolerances:  No Known Allergies    ROS:   Review of Systems   Constitutional:  Negative for chills, fever, malaise/fatigue and weight loss.   HENT:  Negative for congestion and hearing loss.    Eyes:  Negative for blurred vision and double vision.   Respiratory:  Negative for cough, sputum production, shortness of breath and wheezing.    Cardiovascular:  Negative for chest pain and palpitations.   Gastrointestinal:  Negative for abdominal pain, constipation, diarrhea, nausea and vomiting.   Genitourinary:  Negative for dysuria.   Musculoskeletal:  Negative for myalgias.   Skin:  Negative for itching and rash.   Neurological:  Negative for dizziness, focal weakness and headaches.   Endo/Heme/Allergies:  Does not bruise/bleed easily.   Psychiatric/Behavioral:  The patient is not nervous/anxious.       ROS was reviewed and were negative except as above.    Objective    Most Recent Vital Signs:  /60 (BP Location: Left arm, Patient Position: Sitting, BP Cuff Size: Adult)   Pulse 71   Temp 36.4 °C (97.5 °F) (Temporal)   Resp 18   Ht 1.727 m (5' 8\")   Wt 67.6 kg (149 lb)   SpO2 97%   BMI 22.66 kg/m²     Physical Exam:  Physical Exam  Vitals reviewed.   Constitutional:       Appearance: Normal appearance.   HENT:      Head: Normocephalic and atraumatic.      Nose: Nose normal.      Mouth/Throat:      Mouth: Mucous membranes are moist.   Eyes:      Pupils: Pupils are equal, round, and reactive to light.   Cardiovascular:      " Rate and Rhythm: Normal rate and regular rhythm.   Pulmonary:      Effort: Pulmonary effort is normal. No respiratory distress.      Breath sounds: Normal breath sounds. No stridor. No wheezing, rhonchi or rales.   Chest:      Chest wall: No tenderness.   Abdominal:      Palpations: Abdomen is soft.      Comments: Mid abdomen IR liver abscess drain.  Drain site CDI.  No surrounding erythema, swelling or drainage.   Musculoskeletal:         General: No tenderness.      Cervical back: Normal range of motion and neck supple.   Skin:     General: Skin is warm and dry.      Coloration: Skin is not jaundiced.      Findings: No erythema or rash.   Neurological:      Mental Status: He is alert and oriented to person, place, and time.      Motor: No weakness.   Psychiatric:         Mood and Affect: Mood normal.         Behavior: Behavior normal.          Pertinent Lab/Imaging Results:  [unfilled]  @CMP@  WBC   Date/Time Value Ref Range Status   01/13/2025 11:20 AM 11.4 (H) 4.8 - 10.8 K/uL Final     RBC   Date/Time Value Ref Range Status   01/13/2025 11:20 AM 4.22 (L) 4.70 - 6.10 M/uL Final     Hemoglobin   Date/Time Value Ref Range Status   01/13/2025 11:20 AM 13.1 (L) 14.0 - 18.0 g/dL Final     Hematocrit   Date/Time Value Ref Range Status   01/13/2025 11:20 AM 40.3 (L) 42.0 - 52.0 % Final     MCV   Date/Time Value Ref Range Status   01/13/2025 11:20 AM 95.5 81.4 - 97.8 fL Final     MCH   Date/Time Value Ref Range Status   01/13/2025 11:20 AM 31.0 27.0 - 33.0 pg Final     MCHC   Date/Time Value Ref Range Status   01/13/2025 11:20 AM 32.5 32.3 - 36.5 g/dL Final     MPV   Date/Time Value Ref Range Status   01/13/2025 11:20 AM 8.5 (L) 9.0 - 12.9 fL Final      Sodium   Date/Time Value Ref Range Status   01/13/2025 11:20  135 - 145 mmol/L Final     Potassium   Date/Time Value Ref Range Status   01/13/2025 11:20 AM 4.5 3.6 - 5.5 mmol/L Final     Chloride   Date/Time Value Ref Range Status   01/13/2025 11:20  96 - 112  "mmol/L Final     Co2   Date/Time Value Ref Range Status   01/13/2025 11:20 AM 24 20 - 33 mmol/L Final     Glucose   Date/Time Value Ref Range Status   01/13/2025 11:20 AM 86 65 - 99 mg/dL Final     Bun   Date/Time Value Ref Range Status   01/13/2025 11:20 AM 17 8 - 22 mg/dL Final     Creatinine   Date/Time Value Ref Range Status   01/13/2025 11:20 AM 0.72 0.50 - 1.40 mg/dL Final     Alkaline Phosphatase   Date/Time Value Ref Range Status   01/13/2025 11:20  (H) 30 - 99 U/L Final     AST(SGOT)   Date/Time Value Ref Range Status   01/13/2025 11:20 AM 18 12 - 45 U/L Final     ALT(SGPT)   Date/Time Value Ref Range Status   01/13/2025 11:20 AM 27 2 - 50 U/L Final     Total Bilirubin   Date/Time Value Ref Range Status   01/13/2025 11:20 AM 0.4 0.1 - 1.5 mg/dL Final      No results found for: \"CPKTOTAL\"   Recent Labs     01/13/25  1120   ASTSGOT 18   ALTSGPT 27   TBILIRUBIN 0.4   ALKPHOSPHAT 128*   GLOBULIN 2.9     No results found for: \"BLOODCULTU\", \"BLDCULT\", \"BCHOLD\"    No results found for: \"BLOODCULTU\", \"BLDCULT\", \"BCHOLD\"       Blood Culture - Draw one from central line and one from peripheral site  Order: [id number]   Status: Final result       Visible to patient: Yes (not seen)       Next appt: Today at 01:00 PM in Infectious Diseases (Krishan Booker A.P.R.N.)    Specimen Information: Peripheral; Blood   0 Result Notes      Component 2 wk ago   Significant Indicator POS Positive (POS)   Source BLD   Site PERIPHERAL   Culture Result  Abnormal   Growth detected by automated blood culture system. 12/29/2024  18:22    Culture Result Streptococcus intermedius Abnormal    Resulting Agency [facility]        Susceptibility     Streptococcus intermedius     E-TEST     Cefotaxime 0.094 mcg/mL Sensitive                 Specimen Collected: 12/28/24  7:42 PM Last Resulted: 01/01/25  7:19 AM   CULTURE WOUND W/ GRAM STAIN  Order: [id number]   Status: Final result       Visible to patient: Yes (seen)       Next appt: Today at 01:00 PM " in Infectious Diseases (Krishan Shell, A.P.R.N.)    Specimen Information: Wound   0 Result Notes      Component 2 wk ago   Significant Indicator POS Positive (POS)   Source WND   Site Liver abscess   Culture Result - Abnormal    Gram Stain Result Many WBCs.  Many Gram positive cocci in chains.   Culture Result  Abnormal   Streptococcus intermedius  Heavy growth    Resulting Agency [facility]             Specimen Collected: 12/29/24 12:15 PM Last Resulted: 12/31/24  9:42 AM   CT-ABDOMEN-PELVIS WITH  Order: [id number]   Status: Final result       Visible to patient: Yes (seen)       Next appt: Today at 01:00 PM in Infectious Diseases (Krishan Ade, A.P.R.N.)    0 Result Notes  Details    Reading Physician Reading Date Result Priority   Rashid Garcia M.D.  [phone] 1/7/2025      Narrative & Impression     1/7/2025 2:24 PM     HISTORY/REASON FOR EXAM:  Intra-abdominal abscess follow-up.        TECHNIQUE/EXAM DESCRIPTION:   CT scan of the abdomen and pelvis with contrast.     Contrast-enhanced helical scanning was obtained from the diaphragmatic domes through the pubic symphysis following the bolus administration of nonionic contrast without complication.     80 mL of Omnipaque 350 nonionic contrast was administered without complication.     Low dose optimization technique was utilized for this CT exam including automated exposure control and adjustment of the mA and/or kV according to patient size.     COMPARISON: 1/2/2025     FINDINGS:  Lower Chest: Small right pleural effusion may be slightly decreased. There is improvement in right basilar atelectasis.     Liver: Right hepatic abscess may be mildly smaller measuring about 6.3 x 5 cm x 4.9 cm, previously 7.2 x 6.5 cm x 5.9 cm with stable position of percutaneous drainage catheter. There is a stable to slightly decreased small subcapsular fluid collection   adjacent to segment 4 measuring about 0.9 cm in thickness.     Spleen: Borderline enlarged.     Pancreas:  Unremarkable.     Gallbladder: No calcified stones.     Biliary: Nondilated.     Adrenal glands: Normal.     Kidneys: Small nonobstructing renal stone on the left. No hydronephrosis..     Bowel: No obstruction or acute inflammation. Colonic diverticulosis.     Lymph nodes: No adenopathy.     Vasculature: Unremarkable.     Peritoneum: Unremarkable without ascites.     Musculoskeletal: L5 pars defects with grade 2 anterolisthesis L5-S1 with significant spinal stenosis and severe foraminal narrowing.. Left hip arthroplasty.     Pelvis: No adenopathy or free fluid.           IMPRESSION:     1.  Probable mild decrease in size of abscess in the hepatic dome.  2.  Stable to slight decrease in size of tiny subcapsular fluid adjacent to segment 4.  3.  Mild decrease in small right pleural effusion and associated atelectasis.  4.  No other significant change.        Exam Ended: 01/07/25  2:24 PM Last Resulted: 01/07/25  2:40 PM         Impression/Assessment      1. Liver abscess  CBC WITH DIFFERENTIAL    Comp Metabolic Panel      2. Bacteremia        3. 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 Streptococcus infection          Jet Webb is a 69-year-old male patient with history of non-Hodgkin's lymphoma currently on surveillance, history of nephrolithiasis, hyperlipidemia, presented with fevers and chills with a CT scan consistent with a 5.6 cm right liver abscess. Patient underwent CT-guided drainage on 12/29, repeat CT scan with no improvement so drain exchange performed by IR on 1/3. Blood cultures x 2 from 12/28 and IR drain cultures positive for Strep intermedius. Patient did have dental work done, 3 crowns, before Thanksgiving. No dental abscesses, no current toothache. Repeat CT scan continue to demonstrate very slow improvement in size of abscess.  IR performed drain upsize 1/8. TTE with no obvious valvular vegetations, pulmonic valve not well-visualized, tricuspid valve with some  myxomatous changes with moderate TR. Not persistently bacteremic so avoiding CASANDRA.  Patient was discharged on a 8-week course of p.o. Augmentin 875/125 mg twice daily with plans for repeat CT scan     01/15/25- Today Patient reports feeling well.  Denies feeling generally ill, fevers/chills, general malaise, headache, n/v/d.  He reports he has been tolerating Augmentin with no complaints of side effects.  Liver abscess drain with minimal output 4-5 mL every 24 hours.  Mostly saline flush.  Pending repeat CT scan scheduled for 1/22/2025.  Due to slow progress of liver abscess we will increase Augmentin to 3 times daily.  Increased penetration into liver abscess.  He will continue to take daily probiotic.  Most recent labs reviewed from 1/13/2025, WBC mildly elevated 11.4, elevated platelets 536, stable anemia slowly improving 13.1/40.3, CMP mostly unremarkable.  No signs of medication toxicity.  The patient reports that liver drain has had some fluid excrete out along the edges of tubing when flushing.  Decrease drain irrigation to 5 mL twice daily.  Highly likelihood that abscess is nearly resolved    - This infection/ chronic illness posses a threat to life, bodily function, and limb preservation   PLAN:   - Increase  p.o. Augmentin 875/125 mg 3 times daily. End date based upon repeat CT scan.  Plan for an additional 10 days of antibiotic therapy post drain removal  - Repeat Labs CBC/CMP in 2 weeks. Monitoring for side effects, medication toxicity, medication interactions, and for infection  - Medication education provided and S/S of side effects discussed   - Recommend routine follow up with PCP  - Continue wound care to right upper quadrant drain site.  Irrigate IR drain with 5 mL of sterile saline twice daily.  Continue drain output log  - Repeat CT abdomen pelvis scan scheduled for 1/22/2025.  Based upon imaging results would determine length of drain and antibiotic therapy  - Recommended to start po  probiotic        Return visit: 1 week. Follow up with primary care physician for chronic medical problems    I have performed a physical exam,  updated ROS and plan today. I have reviewed previous images, labs, and provider notes.      USHA Baltazar.    All Patients should seek medical re-evaluation or report to the ER for new, increasing or worsening symptoms. In some circumstances medical conditions can change from the initial evaluation and may require emergent medical re-evaluation. This includes but is not limited to chest pain, shortness of breath, atypical abdominal pain, atypical headache, ALOC, fever >101, low blood pressure, high respiratory rate (above 30), low oxygen saturation (below 90%), acute delirium, abnormal bleeding, inability to tolerate any intake, weakness on one side of the body, any worsened or concerning conditions.    Please note that this dictation was created using voice recognition software. I have worked with technical experts from [facility] to optimize the interface.  I have made every reasonable attempt to correct obvious errors, but there may be errors of grammar and possibly content that I did not discover before finalizing the note.

## 2025-01-15 NOTE — TELEPHONE ENCOUNTER
Spoke with patient scheduled follow up this afternoon with JUAN    Patient called with complaints of rapid heartbeat while taking atorvastatin, patient noticed heart rate increase on second or third day, patient stopped medication completely after fourth day. Patient advised that PCP was out of office but writer would address with covering provider and advise recommendations. Patient agreeable and has no further questions at this time.

## 2025-01-16 DIAGNOSIS — L98.9 SKIN LESION OF CHEEK: ICD-10-CM

## 2025-01-20 DIAGNOSIS — K75.0 LIVER ABSCESS: ICD-10-CM

## 2025-01-22 ENCOUNTER — HOSPITAL ENCOUNTER (OUTPATIENT)
Dept: RADIOLOGY | Facility: MEDICAL CENTER | Age: 70
End: 2025-01-22
Attending: INTERNAL MEDICINE
Payer: MEDICARE

## 2025-01-22 DIAGNOSIS — K75.0 LIVER ABSCESS: ICD-10-CM

## 2025-01-22 PROCEDURE — 700117 HCHG RX CONTRAST REV CODE 255: Performed by: INTERNAL MEDICINE

## 2025-01-22 PROCEDURE — 74177 CT ABD & PELVIS W/CONTRAST: CPT

## 2025-01-22 RX ADMIN — IOHEXOL 100 ML: 350 INJECTION, SOLUTION INTRAVENOUS at 17:25

## 2025-01-22 ASSESSMENT — ENCOUNTER SYMPTOMS
SHORTNESS OF BREATH: 0
WHEEZING: 0
BLURRED VISION: 0
PALPITATIONS: 0
CONSTIPATION: 0
NERVOUS/ANXIOUS: 0
NAUSEA: 0
WEIGHT LOSS: 0
ABDOMINAL PAIN: 0
COUGH: 0
FOCAL WEAKNESS: 0
VOMITING: 0
DOUBLE VISION: 0
FEVER: 0
DIARRHEA: 0
BRUISES/BLEEDS EASILY: 0
MYALGIAS: 0
CHILLS: 0
DIZZINESS: 0
SPUTUM PRODUCTION: 0
HEADACHES: 0

## 2025-01-22 NOTE — PROGRESS NOTES
INFECTIOUS  DISEASE  OUTPATIENT CLINIC  NOTE   Subjective   Primary care provider: Sukumar Bunn D.O..     Reason for Follow Up:   Follow-up for     1. Liver abscess  [id number]      2. Bacteremia        3. 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 Streptococcus infection        5. Liver abscess Active [id number]    amoxicillin-clavulanate (AUGMENTIN) 875-125 MG Tab        HPI: Patient previously seen and treated by ID team as inpatient during hospital admission.   Jet Webb is a 69-year-old male patient with history of non-Hodgkin's lymphoma currently on surveillance, history of nephrolithiasis, hyperlipidemia, presented with fevers and chills with a CT scan consistent with a 5.6 cm right liver abscess. Patient underwent CT-guided drainage on 12/29, repeat CT scan with no improvement so drain exchange performed by IR on 1/3. Blood cultures x 2 from 12/28 and IR drain cultures positive for Strep intermedius. Patient did have dental work done, 3 crowns, before Thanksgiving. No dental abscesses, no current toothache. Repeat CT scan continue to demonstrate very slow improvement in size of abscess.  IR performed drain upsize 1/8. TTE with no obvious valvular vegetations, pulmonic valve not well-visualized, tricuspid valve with some myxomatous changes with moderate TR. Not persistently bacteremic so avoiding CASANDRA.  Patient was discharged on a 8-week course of p.o. Augmentin 875/125 mg twice daily with plans for repeat CT scan in 2 weeks.    01/15/25- Today Patient reports feeling well.  Denies feeling generally ill, fevers/chills, general malaise, headache, n/v/d.  He reports he has been tolerating Augmentin with no complaints of side effects.  Liver abscess drain with minimal output 4-5 mL every 24 hours.  Mostly saline flush.  Pending repeat CT scan scheduled for 1/22/2025.  Due to slow progress of liver abscess we will increase Augmentin to 3 times daily.  He will  continue to take daily probiotic.  Most recent labs reviewed from 1/13/2025, WBC mildly elevated 11.4, elevated platelets 536, stable anemia slowly improving 13.1/40.3, CMP mostly unremarkable.  No signs of medication toxicity.  The patient reports that liver drain has had some fluid excrete out along the edges of tubing when flushing.  Decrease drain irrigation to 5 mL twice daily.  Highly likelihood that abscess is nearly resolved    1/23/25-patient following up while on Augmentin 875/125 mg 3 times daily.  Continues to tolerate Augmentin with no complaints of side effects.  CT abdomen pelvis with contrast from 1/22/2025 14 Pashto pigtail right lobe of liver abscess drain in place. Minimal surrounding foci of hypodensity which may represent irrigant. No evidence of new abscess or significant satellite abscess. If drainage output is scant and clinical status is satisfactory, drain removal may be indicated.  Discussed results with IR MD [place].  IR abscessogram and drain removal as drain output has been minimal for the past few days, less than 5 mL minus flushes.  Recommended to stop irrigation of IR drain.  Additional 10 days of p.o. Augmentin 875/125 mg 3 times daily sent to patient's pharmacy for post drain removal.  Repeat labs CBC/CMP in 1 week for continuous monitoring while on antibiotic therapy and post drain removal.  Most recent labs reviewed from 1/13/2025 and discussed above.  Today the patient denies any nausea, vomiting, fever, chills, constipation or diarrhea.  Education provided on signs and symptoms of reoccurrence of infection and when to report to ER/call 911.  If IR drain is left in place patient will need follow-up appointment with ID clinic      Current Antimicrobials: Augmentin 875/125 mg 3 times daily, 8-week course with end date 2/9/2025  Previous Antimicrobials:    Other Current Medications:  Home Medications    Medication Sig Taking? Last Dose Authorizing Provider   amoxicillin-clavulanate  (AUGMENTIN) 875-125 MG Tab Take 1 Tablet by mouth 3 times a day for 10 days. Yes  CHRISTIANE BaltazarP.RMaria VictoriaNMaria Victoria   ibuprofen (MOTRIN) 200 MG Tab Take 600 mg by mouth every 6 hours as needed for Mild Pain or Fever. 200 mg x 3 tablets = 600 mg Yes Taking Physician Outpatient   acetaminophen (TYLENOL) 325 MG Tab Take 650 mg by mouth every four hours as needed for Mild Pain or Fever. 325 mg x 2 tablets = 500 mg Yes Taking Physician Outpatient   acyclovir (ZOVIRAX) 800 MG Tab Take 1 Tablet by mouth 2 times a day.  Patient taking differently: Take 800 mg by mouth every evening. Yes Taking Sukumar Bunn D.O.   atorvastatin (LIPITOR) 10 MG Tab Take 1 Tablet by mouth every evening. Yes Taking Sukumar Bunn D.O.   Azelastine (ASTELIN) 137 MCG/SPRAY Solution SPRAY TWO PUFFS INTO EACH NOSTRIL TWICE A DAY AS DIRECTED Yes Taking Sukumar Bunn D.O.   montelukast (SINGULAIR) 10 MG Tab Take 1 Tablet by mouth every day. Yes Taking Sukumar Bunn D.O.        PMH:  Past Medical History:   Diagnosis Date    Cancer (HCC)     Lymphoma (HCC)      Past Surgical History:   Procedure Laterality Date    APPENDECTOMY      ARTHROPLASTY       Family History   Problem Relation Age of Onset    Heart Disease Father     Cancer Neg Hx      Social History     Socioeconomic History    Marital status:      Spouse name: Not on file    Number of children: Not on file    Years of education: Not on file    Highest education level: Not on file   Occupational History    Not on file   Tobacco Use    Smoking status: Never    Smokeless tobacco: Never   Vaping Use    Vaping status: Never Used   Substance and Sexual Activity    Alcohol use: Yes     Comment: ocassional    Drug use: Never    Sexual activity: Not on file   Other Topics Concern    Not on file   Social History Narrative     to his , recently moved from [place]. 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      Social Drivers of Health     Financial Resource Strain: Low Risk   (7/18/2024)    Overall Financial Resource Strain (CARDIA)     Difficulty of Paying Living Expenses: Not hard at all   Food Insecurity: No Food Insecurity (12/28/2024)    Hunger Vital Sign     Worried About Running Out of Food in the Last Year: Never true     Ran Out of Food in the Last Year: Never true   Transportation Needs: No Transportation Needs (12/28/2024)    PRAPARE - Transportation     Lack of Transportation (Medical): No     Lack of Transportation (Non-Medical): No   Physical Activity: Sufficiently Active (12/1/2023)    Received from [facility]    Exercise Vital Sign     Days of Exercise per Week: 7 days     Minutes of Exercise per Session: 30 min   Stress: No Stress Concern Present (12/1/2023)    Received from [facility]    [place] [employer] - Occupational Stress Questionnaire     Feeling of Stress : Not at all   Social Connections: Moderately Integrated (12/1/2023)    Received from [facility]    Social Connection and Isolation Panel [NHANES]     Frequency of Communication with Friends and Family: More than three times a week     Frequency of Social Gatherings with Friends and Family: Three times a week     Attends Evangelical Services: Never     Active Member of Clubs or Organizations: Yes     Attends Club or Organization Meetings: More than 4 times per year     Marital Status:    Intimate Partner Violence: Not At Risk (12/28/2024)    Humiliation, Afraid, Rape, and Kick questionnaire     Fear of Current or Ex-Partner: No     Emotionally Abused: No     Physically Abused: No     Sexually Abused: No   Housing Stability: Low Risk  (12/28/2024)    Housing Stability Vital Sign     Unable to Pay for Housing in the Last Year: No     Number of Times Moved in the Last Year: 0     Homeless in the Last Year: No           Allergies/Intolerances:  No Known Allergies    ROS:   Review of Systems   Constitutional:  Negative for chills, fever,  "malaise/fatigue and weight loss.   HENT:  Negative for congestion and hearing loss.    Eyes:  Negative for blurred vision and double vision.   Respiratory:  Negative for cough, sputum production, shortness of breath and wheezing.    Cardiovascular:  Negative for chest pain and palpitations.   Gastrointestinal:  Negative for abdominal pain, constipation, diarrhea, nausea and vomiting.   Genitourinary:  Negative for dysuria.   Musculoskeletal:  Negative for myalgias.   Skin:  Negative for itching and rash.   Neurological:  Negative for dizziness, focal weakness and headaches.   Endo/Heme/Allergies:  Does not bruise/bleed easily.   Psychiatric/Behavioral:  The patient is not nervous/anxious.       ROS was reviewed and were negative except as above.    Objective    Most Recent Vital Signs:  /70 (BP Location: Left arm, Patient Position: Sitting, BP Cuff Size: Adult)   Pulse 66   Temp 36.4 °C (97.6 °F) (Temporal)   Resp 18   Ht 1.727 m (5' 8\")   Wt 69.7 kg (153 lb 11.2 oz)   SpO2 98%   BMI 23.37 kg/m²     Physical Exam:  Physical Exam  Vitals reviewed.   Constitutional:       Appearance: Normal appearance.   HENT:      Head: Normocephalic and atraumatic.      Nose: Nose normal.      Mouth/Throat:      Mouth: Mucous membranes are moist.   Eyes:      Pupils: Pupils are equal, round, and reactive to light.   Cardiovascular:      Rate and Rhythm: Normal rate and regular rhythm.   Pulmonary:      Effort: Pulmonary effort is normal. No respiratory distress.      Breath sounds: Normal breath sounds. No stridor. No wheezing, rhonchi or rales.   Chest:      Chest wall: No tenderness.   Abdominal:      Palpations: Abdomen is soft.      Comments: Mid abdomen IR liver abscess drain.  Drain site CDI.  No surrounding erythema, swelling or drainage.  Small amount of serosanguineous fluid   Musculoskeletal:         General: No tenderness.      Cervical back: Normal range of motion and neck supple.   Skin:     General: Skin " is warm and dry.      Coloration: Skin is not jaundiced.      Findings: No erythema or rash.   Neurological:      Mental Status: He is alert and oriented to person, place, and time.      Motor: No weakness.   Psychiatric:         Mood and Affect: Mood normal.         Behavior: Behavior normal.          Pertinent Lab/Imaging Results:  [unfilled]  @CMP@  WBC   Date/Time Value Ref Range Status   01/13/2025 11:20 AM 11.4 (H) 4.8 - 10.8 K/uL Final     RBC   Date/Time Value Ref Range Status   01/13/2025 11:20 AM 4.22 (L) 4.70 - 6.10 M/uL Final     Hemoglobin   Date/Time Value Ref Range Status   01/13/2025 11:20 AM 13.1 (L) 14.0 - 18.0 g/dL Final     Hematocrit   Date/Time Value Ref Range Status   01/13/2025 11:20 AM 40.3 (L) 42.0 - 52.0 % Final     MCV   Date/Time Value Ref Range Status   01/13/2025 11:20 AM 95.5 81.4 - 97.8 fL Final     MCH   Date/Time Value Ref Range Status   01/13/2025 11:20 AM 31.0 27.0 - 33.0 pg Final     MCHC   Date/Time Value Ref Range Status   01/13/2025 11:20 AM 32.5 32.3 - 36.5 g/dL Final     MPV   Date/Time Value Ref Range Status   01/13/2025 11:20 AM 8.5 (L) 9.0 - 12.9 fL Final      Sodium   Date/Time Value Ref Range Status   01/13/2025 11:20  135 - 145 mmol/L Final     Potassium   Date/Time Value Ref Range Status   01/13/2025 11:20 AM 4.5 3.6 - 5.5 mmol/L Final     Chloride   Date/Time Value Ref Range Status   01/13/2025 11:20  96 - 112 mmol/L Final     Co2   Date/Time Value Ref Range Status   01/13/2025 11:20 AM 24 20 - 33 mmol/L Final     Glucose   Date/Time Value Ref Range Status   01/13/2025 11:20 AM 86 65 - 99 mg/dL Final     Bun   Date/Time Value Ref Range Status   01/13/2025 11:20 AM 17 8 - 22 mg/dL Final     Creatinine   Date/Time Value Ref Range Status   01/13/2025 11:20 AM 0.72 0.50 - 1.40 mg/dL Final     Alkaline Phosphatase   Date/Time Value Ref Range Status   01/13/2025 11:20  (H) 30 - 99 U/L Final     AST(SGOT)   Date/Time Value Ref Range Status   01/13/2025  "11:20 AM 18 12 - 45 U/L Final     ALT(SGPT)   Date/Time Value Ref Range Status   01/13/2025 11:20 AM 27 2 - 50 U/L Final     Total Bilirubin   Date/Time Value Ref Range Status   01/13/2025 11:20 AM 0.4 0.1 - 1.5 mg/dL Final      No results found for: \"CPKTOTAL\"         No results found for: \"BLOODCULTU\", \"BLDCULT\", \"BCHOLD\"    No results found for: \"BLOODCULTU\", \"BLDCULT\", \"BCHOLD\"       Blood Culture - Draw one from central line and one from peripheral site  Order: [id number]   Status: Final result       Visible to patient: Yes (not seen)       Next appt: Today at 01:00 PM in Infectious Diseases (Krishan Booker, A.P.R.N.)    Specimen Information: Peripheral; Blood   0 Result Notes      Component 2 wk ago   Significant Indicator POS Positive (POS)   Source BLD   Site PERIPHERAL   Culture Result  Abnormal   Growth detected by automated blood culture system. 12/29/2024  18:22    Culture Result Streptococcus intermedius Abnormal    Resulting Agency [facility]        Susceptibility     Streptococcus intermedius     E-TEST     Cefotaxime 0.094 mcg/mL Sensitive                 Specimen Collected: 12/28/24  7:42 PM Last Resulted: 01/01/25  7:19 AM   CULTURE WOUND W/ GRAM STAIN  Order: [id number]   Status: Final result       Visible to patient: Yes (seen)       Next appt: Today at 01:00 PM in Infectious Diseases (Krishan Booker, A.P.R.N.)    Specimen Information: Wound   0 Result Notes      Component 2 wk ago   Significant Indicator POS Positive (POS)   Source WND   Site Liver abscess   Culture Result - Abnormal    Gram Stain Result Many WBCs.  Many Gram positive cocci in chains.   Culture Result  Abnormal   Streptococcus intermedius  Heavy growth    Resulting Agency [facility]             Specimen Collected: 12/29/24 12:15 PM Last Resulted: 12/31/24  9:42 AM   CT-ABDOMEN-PELVIS WITH  Order: [id number]   Status: Final result       Visible to patient: Yes (seen)       Next appt: Today at 01:00 PM in Infectious Diseases (Krishan Booker, A.P.R.N.)  "   0 Result Notes  Details    Reading Physician Reading Date Result Priority   Rashid Garcia M.D.  [phone] 1/7/2025      Narrative & Impression     1/7/2025 2:24 PM     HISTORY/REASON FOR EXAM:  Intra-abdominal abscess follow-up.        TECHNIQUE/EXAM DESCRIPTION:   CT scan of the abdomen and pelvis with contrast.     Contrast-enhanced helical scanning was obtained from the diaphragmatic domes through the pubic symphysis following the bolus administration of nonionic contrast without complication.     80 mL of Omnipaque 350 nonionic contrast was administered without complication.     Low dose optimization technique was utilized for this CT exam including automated exposure control and adjustment of the mA and/or kV according to patient size.     COMPARISON: 1/2/2025     FINDINGS:  Lower Chest: Small right pleural effusion may be slightly decreased. There is improvement in right basilar atelectasis.     Liver: Right hepatic abscess may be mildly smaller measuring about 6.3 x 5 cm x 4.9 cm, previously 7.2 x 6.5 cm x 5.9 cm with stable position of percutaneous drainage catheter. There is a stable to slightly decreased small subcapsular fluid collection   adjacent to segment 4 measuring about 0.9 cm in thickness.     Spleen: Borderline enlarged.     Pancreas: Unremarkable.     Gallbladder: No calcified stones.     Biliary: Nondilated.     Adrenal glands: Normal.     Kidneys: Small nonobstructing renal stone on the left. No hydronephrosis..     Bowel: No obstruction or acute inflammation. Colonic diverticulosis.     Lymph nodes: No adenopathy.     Vasculature: Unremarkable.     Peritoneum: Unremarkable without ascites.     Musculoskeletal: L5 pars defects with grade 2 anterolisthesis L5-S1 with significant spinal stenosis and severe foraminal narrowing.. Left hip arthroplasty.     Pelvis: No adenopathy or free fluid.           IMPRESSION:     1.  Probable mild decrease in size of abscess in the hepatic dome.  2.   Stable to slight decrease in size of tiny subcapsular fluid adjacent to segment 4.  3.  Mild decrease in small right pleural effusion and associated atelectasis.  4.  No other significant change.        Exam Ended: 01/07/25  2:24 PM Last Resulted: 01/07/25  2:40 PM       CT-ABDOMEN-PELVIS WITH  Order: [id number]   Status: Final result       Visible to patient: Yes (seen)       Next appt: 02/13/2025 at 09:00 AM in Internal Medicine (Shar Rahman M.D.)       Dx: Liver abscess    0 Result Notes  Details    Reading Physician Reading Date Result Priority   Shar Jorgensen M.D.  [phone] 1/22/2025      Narrative & Impression     1/22/2025 5:17 PM     HISTORY/REASON FOR EXAM:  Liver abscess followup.  Initial catheter drainage 12/29/2024. Recent fluoroscopic guided catheter exchange and upsizing 1/8/2025     TECHNIQUE/EXAM DESCRIPTION:   CT scan of the abdomen and pelvis with contrast.     Contrast-enhanced helical scanning was obtained from the diaphragmatic domes through the pubic symphysis following the bolus administration of nonionic contrast without complication.     100 mL of Omnipaque 350 nonionic contrast was administered without complication.     Low dose optimization technique was utilized for this CT exam including automated exposure control and adjustment of the mA and/or kV according to patient size.     COMPARISON: CT of the abdomen and pelvis 12/28/2024, guided abscess drainage 12/29/2024, CT abdomen with contrast 1/2/2025, CT of the abdomen and pelvis with contrast 1/7/2025, IR catheter exchange and abscessogram 1/3/2025, IR abscessogram and catheter   exchange 1/8/2025     FINDINGS:     The lung bases are unremarkable. There has been resolution of right pleural effusion and right lower lobe atelectasis. Incidentally noted coronary calcification.     There is a 14 Israeli pigtail catheter in the right lobe near the liver dome centrally at segments 7/8. There is some minimal patchy hypodensity  surrounding the catheter spanning about 36 mm x 33 mm (axial image 11, series 2). There is no significant   persisting abscess or new abscess or satellite lesion. The patchy hypodensity might merely represent small amounts of irrigant.     There is normal vascular enhancement of the intrahepatic and extrahepatic portal veins and hepatic veins.     There is a minimal rim of perihepatic/anterior subphrenic fluid.     The gallbladder is unremarkable.     Spleen is normal.     The pancreas is normal.     The adrenal glands are normal.     The kidneys show uniform nephrograms. There is a 4 mm punctate calcification in the left kidney midpole consistent with a nonobstructive calculus (axial image 33, series 2). There are no perinephric collections. There is no hydronephrosis. There are no   ureteral calculi.     The abdominal aorta shows some scattered atherosclerotic calcification. There are a few para-aortic lymph nodes. There is an 8 mm short axis aortocaval node (axial image 42, series 2). There is a left-sided infrarenal 12 mm para-aortic node (axial image   21, series 2). No change from prior exam. More caudad and just above the aortoiliac bifurcation, there are 2 [id number] left para-aortic nodes with short axes of 9 mm and 11 mm respectively (axial image 49, series 2).     The abdominal bowel loops are unremarkable. There is no free air or free fluid.     In the pelvis, the urinary bladder is unremarkable. Metallic artifact from left total hip arthroplasty obscures some anatomic detail in the pelvis.     There is diverticulosis of the sigmoid colon without evidence of acute diverticulitis. There is no free air or free fluid in the pelvis.     There is no pelvic adenopathy. There are multiple small subcentimeter bilateral inguinal lymph nodes.     There is no significant abdominal wall hernia.     The bony structures show L5-S1 grade 3 anterolisthesis with bilateral L5 spondylolysis.     IMPRESSION:     1.   Resolution of right pleural effusion and right lower lobe atelectasis.  2.  14 Setswana pigtail right lobe of liver abscess drain in place. Minimal surrounding foci of hypodensity which may represent irrigant. No evidence of new abscess or significant satellite abscess. If drainage output is scant and clinical status is   satisfactory, drain removal may be indicated.  3.  Minimal thin rim of perihepatic/anterior subphrenic fluid.  4.  Several para-aortic lymph nodes are identified as detailed above. No change from prior exam. These may be reactive. Neoplasm is not excluded.  5.  Diverticulosis of the sigmoid colon without diverticulitis.  6.  Left total hip arthroplasty.  7.  L5-S1 marked spondylolisthesis with bilateral L5 spondylolysis.        Exam Ended: 01/22/25  5:27 PM Last Resulted: 01/22/25  5:52 PM         Impression/Assessment      1. Liver abscess  [id number]      2. Bacteremia        3. 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 Streptococcus infection        5. Liver abscess Active [id number]    amoxicillin-clavulanate (AUGMENTIN) 875-125 MG Tab      Jet Webb is a 69-year-old male patient with history of non-Hodgkin's lymphoma currently on surveillance, history of nephrolithiasis, hyperlipidemia, presented with fevers and chills with a CT scan consistent with a 5.6 cm right liver abscess. Patient underwent CT-guided drainage on 12/29, repeat CT scan with no improvement so drain exchange performed by IR on 1/3. Blood cultures x 2 from 12/28 and IR drain cultures positive for Strep intermedius. Patient did have dental work done, 3 crowns, before Thanksgiving. No dental abscesses, no current toothache. Repeat CT scan continue to demonstrate very slow improvement in size of abscess.  IR performed drain upsize 1/8. TTE with no obvious valvular vegetations, pulmonic valve not well-visualized, tricuspid valve with some myxomatous changes with moderate TR. Not  persistently bacteremic so avoiding CASANDRA.  Patient was discharged on a 8-week course of p.o. Augmentin 875/125 mg twice daily with plans for repeat CT scan     1/23/25-patient following up while on Augmentin 875/125 mg 3 times daily.  Continues to tolerate Augmentin with no complaints of side effects.  CT abdomen pelvis with contrast from 1/22/2025 14 Somali pigtail right lobe of liver abscess drain in place. Minimal surrounding foci of hypodensity which may represent irrigant. No evidence of new abscess or significant satellite abscess. If drainage output is scant and clinical status is satisfactory, drain removal may be indicated.  Discussed results with IR MD [place].  IR abscessogram and drain removal as drain output has been minimal for the past few days, less than 5 mL minus flushes.  Recommended to stop irrigation of IR drain.  Additional 10 days of p.o. Augmentin 875/125 mg 3 times daily sent to patient's pharmacy for post drain removal.  Repeat labs CBC/CMP in 1 week for continuous monitoring while on antibiotic therapy and post drain removal.  Most recent labs reviewed from 1/13/2025 and discussed above.  Today the patient denies any nausea, vomiting, fever, chills, constipation or diarrhea.  Education provided on signs and symptoms of reoccurrence of infection and when to report to ER/call 911.  If IR drain is left in place patient will need follow-up appointment with ID clinic    - This infection/ chronic illness posses a threat to life, bodily function, and limb preservation   PLAN:   - Continue p.o. Augmentin 875/125 mg 3 times daily.  For an additional 10 days post IR drain removal.  10 additional days of Augmentin sent to patient's pharmacy.  - Repeat Labs CBC/CMP in 1 week. Monitoring for side effects, medication toxicity, medication interactions, and for infection  - Medication education provided and S/S of side effects discussed   - Recommend routine follow up with PCP  - IR abscessogram and drain  removal.   - Continue wound care to right upper quadrant drain site.  Stop irrigating IR drain.  Continue drain output log  - Continue po probiotic        Return visit: PRN. Follow up with primary care physician for chronic medical problems    I have performed a physical exam,  updated ROS and plan today. I have reviewed previous images, labs, and provider notes.      USHA Baltazar.    All Patients should seek medical re-evaluation or report to the ER for new, increasing or worsening symptoms. In some circumstances medical conditions can change from the initial evaluation and may require emergent medical re-evaluation. This includes but is not limited to chest pain, shortness of breath, atypical abdominal pain, atypical headache, ALOC, fever >101, low blood pressure, high respiratory rate (above 30), low oxygen saturation (below 90%), acute delirium, abnormal bleeding, inability to tolerate any intake, weakness on one side of the body, any worsened or concerning conditions.    Please note that this dictation was created using voice recognition software. I have worked with technical experts from [facility] to optimize the interface.  I have made every reasonable attempt to correct obvious errors, but there may be errors of grammar and possibly content that I did not discover before finalizing the note.

## 2025-01-23 ENCOUNTER — OFFICE VISIT (OUTPATIENT)
Dept: INFECTIOUS DISEASES | Facility: MEDICAL CENTER | Age: 70
End: 2025-01-23
Attending: NURSE PRACTITIONER
Payer: MEDICARE

## 2025-01-23 VITALS
WEIGHT: 153.7 LBS | TEMPERATURE: 97.6 F | SYSTOLIC BLOOD PRESSURE: 110 MMHG | BODY MASS INDEX: 23.3 KG/M2 | HEART RATE: 66 BPM | HEIGHT: 68 IN | DIASTOLIC BLOOD PRESSURE: 70 MMHG | OXYGEN SATURATION: 98 % | RESPIRATION RATE: 18 BRPM

## 2025-01-23 DIAGNOSIS — A49.1 STREPTOCOCCUS INFECTION: ICD-10-CM

## 2025-01-23 DIAGNOSIS — K75.0 LIVER ABSCESS: ICD-10-CM

## 2025-01-23 DIAGNOSIS — R78.81 BACTEREMIA: ICD-10-CM

## 2025-01-23 DIAGNOSIS — C82.01 GRADE 1 FOLLICULAR LYMPHOMA OF LYMPH NODES OF NECK (HCC): ICD-10-CM

## 2025-01-23 PROCEDURE — 3078F DIAST BP <80 MM HG: CPT | Performed by: NURSE PRACTITIONER

## 2025-01-23 PROCEDURE — 3074F SYST BP LT 130 MM HG: CPT | Performed by: NURSE PRACTITIONER

## 2025-01-23 PROCEDURE — 99212 OFFICE O/P EST SF 10 MIN: CPT | Performed by: NURSE PRACTITIONER

## 2025-01-23 PROCEDURE — 99214 OFFICE O/P EST MOD 30 MIN: CPT | Performed by: NURSE PRACTITIONER

## 2025-01-23 ASSESSMENT — FIBROSIS 4 INDEX: FIB4 SCORE: 0.46

## 2025-01-24 ENCOUNTER — HOSPITAL ENCOUNTER (OUTPATIENT)
Dept: RADIOLOGY | Facility: MEDICAL CENTER | Age: 70
End: 2025-01-24
Attending: NURSE PRACTITIONER
Payer: MEDICARE

## 2025-01-24 VITALS
SYSTOLIC BLOOD PRESSURE: 142 MMHG | HEART RATE: 73 BPM | DIASTOLIC BLOOD PRESSURE: 72 MMHG | TEMPERATURE: 98.5 F | OXYGEN SATURATION: 93 % | RESPIRATION RATE: 14 BRPM

## 2025-01-24 DIAGNOSIS — K75.0 LIVER ABSCESS: ICD-10-CM

## 2025-01-24 PROCEDURE — 49424 ASSESS CYST CONTRAST INJECT: CPT

## 2025-01-24 PROCEDURE — 700117 HCHG RX CONTRAST REV CODE 255: Performed by: RADIOLOGY

## 2025-01-24 RX ADMIN — IOHEXOL 5 ML: 300 INJECTION, SOLUTION INTRAVENOUS at 16:30

## 2025-01-24 ASSESSMENT — PAIN SCALES - GENERAL
PAINLEVEL: NO PAIN
PAINLEVEL: NO PAIN

## 2025-01-24 NOTE — PROGRESS NOTES
Pt presents to [facility]. Patient was consented by MD at bedside, confirmed by this RN and consent at bedside. Pt transferred to IR table in supine position. Patient underwent a Abscess-o-gram with drain removal by Dr. Chandler. Procedure site was marked by MD and verified using imaging guidance. Pt placed on monitor, prepped and draped in a sterile fashion. Vitals (BP and spO2 only - NON sedation case) were taken every 10 minutes and remained stable during procedure (see doc flow sheet for results). Report called to AGNES Zarate. Pt transported by stretcher with RN to [facility].

## 2025-01-24 NOTE — OR SURGEON
Immediate Post- Operative Note        Findings: Hepatic abscess resolved. Drain removed, no tract bleeding.      Procedure(s): Abscessogram with drain removal under fluoro guidance.       Estimated Blood Loss: Less than 5 ml        Complications: None            1/24/2025     3:59 PM     Moshe Chandler M.D.

## 2025-01-25 NOTE — PROGRESS NOTES
Patient arrived to [facility] post liver abscess-o-gram and drain removal. Report received from AGNES Neville. One set of vital signs obtained. Midline abdomen site is clean, dry, intact, and no hematoma. Discharge instructions discussed with the patient. Copy of discharge paperwork provided to the patient and a signed copy of discharge paperwork is in patient's chart. Dressing change supplies given to patient. All personal belongings are in patients possession. All questions have been answered. Patient ambulates off the unit.

## 2025-01-29 ENCOUNTER — HOSPITAL ENCOUNTER (OUTPATIENT)
Dept: LAB | Facility: MEDICAL CENTER | Age: 70
End: 2025-01-29
Attending: NURSE PRACTITIONER
Payer: MEDICARE

## 2025-01-29 DIAGNOSIS — K75.0 LIVER ABSCESS: ICD-10-CM

## 2025-01-29 LAB
ALBUMIN SERPL BCP-MCNC: 4.1 G/DL (ref 3.2–4.9)
ALBUMIN/GLOB SERPL: 1.4 G/DL
ALP SERPL-CCNC: 103 U/L (ref 30–99)
ALT SERPL-CCNC: 19 U/L (ref 2–50)
ANION GAP SERPL CALC-SCNC: 13 MMOL/L (ref 7–16)
AST SERPL-CCNC: 15 U/L (ref 12–45)
BASOPHILS # BLD AUTO: 0.5 % (ref 0–1.8)
BASOPHILS # BLD: 0.06 K/UL (ref 0–0.12)
BILIRUB SERPL-MCNC: 0.5 MG/DL (ref 0.1–1.5)
BUN SERPL-MCNC: 15 MG/DL (ref 8–22)
CALCIUM ALBUM COR SERPL-MCNC: 9.4 MG/DL (ref 8.5–10.5)
CALCIUM SERPL-MCNC: 9.5 MG/DL (ref 8.5–10.5)
CHLORIDE SERPL-SCNC: 103 MMOL/L (ref 96–112)
CO2 SERPL-SCNC: 26 MMOL/L (ref 20–33)
CREAT SERPL-MCNC: 0.7 MG/DL (ref 0.5–1.4)
EOSINOPHIL # BLD AUTO: 0.13 K/UL (ref 0–0.51)
EOSINOPHIL NFR BLD: 1.1 % (ref 0–6.9)
ERYTHROCYTE [DISTWIDTH] IN BLOOD BY AUTOMATED COUNT: 43.3 FL (ref 35.9–50)
GFR SERPLBLD CREATININE-BSD FMLA CKD-EPI: 98 ML/MIN/1.73 M 2
GLOBULIN SER CALC-MCNC: 3 G/DL (ref 1.9–3.5)
GLUCOSE SERPL-MCNC: 82 MG/DL (ref 65–99)
HCT VFR BLD AUTO: 40.4 % (ref 42–52)
HGB BLD-MCNC: 12.9 G/DL (ref 14–18)
IMM GRANULOCYTES # BLD AUTO: 0.06 K/UL (ref 0–0.11)
IMM GRANULOCYTES NFR BLD AUTO: 0.5 % (ref 0–0.9)
LYMPHOCYTES # BLD AUTO: 5 K/UL (ref 1–4.8)
LYMPHOCYTES NFR BLD: 42.7 % (ref 22–41)
MCH RBC QN AUTO: 30.1 PG (ref 27–33)
MCHC RBC AUTO-ENTMCNC: 31.9 G/DL (ref 32.3–36.5)
MCV RBC AUTO: 94.4 FL (ref 81.4–97.8)
MONOCYTES # BLD AUTO: 1.22 K/UL (ref 0–0.85)
MONOCYTES NFR BLD AUTO: 10.4 % (ref 0–13.4)
NEUTROPHILS # BLD AUTO: 5.23 K/UL (ref 1.82–7.42)
NEUTROPHILS NFR BLD: 44.8 % (ref 44–72)
NRBC # BLD AUTO: 0 K/UL
NRBC BLD-RTO: 0 /100 WBC (ref 0–0.2)
PLATELET # BLD AUTO: 283 K/UL (ref 164–446)
PMV BLD AUTO: 8.6 FL (ref 9–12.9)
POTASSIUM SERPL-SCNC: 4.7 MMOL/L (ref 3.6–5.5)
PROT SERPL-MCNC: 7.1 G/DL (ref 6–8.2)
RBC # BLD AUTO: 4.28 M/UL (ref 4.7–6.1)
SODIUM SERPL-SCNC: 142 MMOL/L (ref 135–145)
WBC # BLD AUTO: 11.7 K/UL (ref 4.8–10.8)

## 2025-01-29 PROCEDURE — 85025 COMPLETE CBC W/AUTO DIFF WBC: CPT

## 2025-01-29 PROCEDURE — 80053 COMPREHEN METABOLIC PANEL: CPT

## 2025-01-29 PROCEDURE — 36415 COLL VENOUS BLD VENIPUNCTURE: CPT

## 2025-01-29 NOTE — TELEPHONE ENCOUNTER
Patient called needs Augmentin RX forwarded to [facility] pharmacy, previously sent to [facility] and will not be dispensed/ delivered on time

## 2025-01-31 ENCOUNTER — OFFICE VISIT (OUTPATIENT)
Dept: DERMATOLOGY | Facility: IMAGING CENTER | Age: 70
End: 2025-01-31
Payer: MEDICARE

## 2025-01-31 DIAGNOSIS — L57.0 ACTINIC KERATOSIS: ICD-10-CM

## 2025-01-31 DIAGNOSIS — L82.1 SEBORRHEIC KERATOSIS: ICD-10-CM

## 2025-01-31 NOTE — PROGRESS NOTES
"CC: Other (Spots of concern )     Subjective: Est patient here for spots of concern Rt cheek.  And right ear dryness, scaling - comes/goes.    History of skin cancer: No  History of precancers/actinic keratoses: Yes, Details: rt ear. Put some\"ointment on it for a while\" ( efudex maybe)   History of biopsies:Yes, Details: benign   History of blistering/severe sunburns:No  Family history of skin cancer:No  Family history of atypical moles:No    ROS: no fevers/chills. No itch.  No cough  Relevant PMH:NHL, admitted for Strep liver abscess Jan 2025  Social: NS    PE: Gen:WDWN male in NAD. Skin: right cheek/ear focal exam - few waxy, stuck-on papules on right cheek, appearing benign. Pink macule right mid helix, no notable HK    A/P:   AKs: right ear  -counseled on diagnosis and treatment, including risks/benefits/alternatives of cyrotherapy  -LN2 25 sec X 2 cycles X 1lesions  -f/u if persists at 1 month    SK, r cheek:  -b/r  -LN2 20 sec X 2 cycles X 3lesions    ASHLEY Summer 2025    I have reviewed medications relevant to my specialty.          "

## 2025-02-10 ENCOUNTER — APPOINTMENT (OUTPATIENT)
Dept: PHYSICAL THERAPY | Facility: REHABILITATION | Age: 70
End: 2025-02-10
Payer: MEDICARE

## 2025-02-13 ENCOUNTER — APPOINTMENT (OUTPATIENT)
Dept: INTERNAL MEDICINE | Facility: OTHER | Age: 70
End: 2025-02-13
Payer: MEDICARE

## 2025-02-13 VITALS
HEIGHT: 68 IN | BODY MASS INDEX: 23.79 KG/M2 | OXYGEN SATURATION: 98 % | HEART RATE: 63 BPM | SYSTOLIC BLOOD PRESSURE: 123 MMHG | DIASTOLIC BLOOD PRESSURE: 66 MMHG | WEIGHT: 157 LBS | TEMPERATURE: 98.2 F

## 2025-02-13 DIAGNOSIS — R78.81 BACTEREMIA: ICD-10-CM

## 2025-02-13 DIAGNOSIS — Z91.09 ENVIRONMENTAL ALLERGIES: ICD-10-CM

## 2025-02-13 DIAGNOSIS — E78.5 DYSLIPIDEMIA: ICD-10-CM

## 2025-02-13 DIAGNOSIS — K75.0 LIVER ABSCESS: ICD-10-CM

## 2025-02-13 DIAGNOSIS — M25.551 RIGHT HIP PAIN: ICD-10-CM

## 2025-02-13 DIAGNOSIS — C82.01 GRADE 1 FOLLICULAR LYMPHOMA OF LYMPH NODES OF NECK (HCC): ICD-10-CM

## 2025-02-13 DIAGNOSIS — B00.2 ORAL HERPES: ICD-10-CM

## 2025-02-13 PROBLEM — R03.0 ELEVATED BLOOD PRESSURE READING: Status: RESOLVED | Noted: 2024-11-14 | Resolved: 2025-02-13

## 2025-02-13 PROBLEM — S01.81XA CHIN LACERATION: Status: RESOLVED | Noted: 2024-11-14 | Resolved: 2025-02-13
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
## 2025-02-13 PROCEDURE — 3074F SYST BP LT 130 MM HG: CPT | Performed by: INTERNAL MEDICINE

## 2025-02-13 PROCEDURE — 3078F DIAST BP <80 MM HG: CPT | Performed by: INTERNAL MEDICINE

## 2025-02-13 PROCEDURE — 99214 OFFICE O/P EST MOD 30 MIN: CPT | Performed by: INTERNAL MEDICINE

## 2025-02-13 ASSESSMENT — FIBROSIS 4 INDEX: FIB4 SCORE: 0.86

## 2025-02-13 NOTE — PROGRESS NOTES
Established Patient    Patient Care Team:  Shar Rahman M.D. as PCP - General (Internal Medicine)  Erik Chen D.O. (Hematology & Oncology)  Marcial Camacho, PT, DPT, OCS as Physical Therapist (Physical Therapy)    Jet Webb is a 69 y.o. 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    ROS:     Denies any new chest pain or shortness of breath.  No changes to urinary or bowel function.  See HPI.    Past Medical History:   Diagnosis Date    Cancer (HCC)     Dyslipidemia 05/13/2024    Chronic, LDL 36  Hdl slightly low at 39, so encouraged exercise.  Current regimen: Lipitor 10 mg nightly      History of kidney stones 05/13/2024    -kidney stone s/p lithotripsy      Liver abscess 12/28/2024    Lymphoma (HCC)     Mild aortic valve sclerosis 05/16/2024    Echocardiogram May 2024 showed.      Oral herpes 05/13/2024    On acyclovir everyday.       Social History     Tobacco Use    Smoking status: Never    Smokeless tobacco: Never   Vaping Use    Vaping status: Never Used   Substance Use Topics    Alcohol use: Yes     Comment: ocassional    Drug use: Never     Current Outpatient Medications   Medication Sig Dispense Refill    ibuprofen (MOTRIN) 200 MG Tab Take 600 mg by mouth every 6 hours as needed for Mild Pain or Fever. 200 mg x 3 tablets = 600 mg      acetaminophen (TYLENOL) 325 MG Tab Take 650 mg by mouth every four hours as needed for Mild Pain or Fever. 325 mg x 2 tablets = 500 mg      acyclovir (ZOVIRAX) 800 MG Tab Take 1 Tablet by mouth 2 times a day. (Patient taking differently: Take 800 mg by mouth every evening.) 200 Tablet 2    atorvastatin (LIPITOR) 10 MG Tab Take 1 Tablet by  "mouth every evening. 100 Tablet 2    Azelastine (ASTELIN) 137 MCG/SPRAY Solution SPRAY TWO PUFFS INTO EACH NOSTRIL TWICE A DAY AS DIRECTED 90 mL 2    montelukast (SINGULAIR) 10 MG Tab Take 1 Tablet by mouth every day. 100 Tablet 2     No current facility-administered medications for this visit.       Physical Exam:  /66 (BP Location: Left arm, Patient Position: Sitting, BP Cuff Size: Adult)   Pulse 63   Temp 36.8 °C (98.2 °F) (Temporal)   Ht 1.727 m (5' 8\")   Wt 71.2 kg (157 lb)   SpO2 98%   BMI 23.87 kg/m²   General: Well developed, well nourished male, in no distress.  Eyes: Conjuntiva without any obvious injection or erythema.   Cardiovascular: Heart is regular with no murmur  Lungs: Clear to auscultation bilaterally. No wheezes, rhonci or crackles heard. Respiratory effort is normal.  Abd: Soft, non-tender, no hepatomegaly or hepatic tenderness  Ext: No edema    HPI / Assessment / Plan:  1. Liver abscess  2. Bacteremia  He had quite a complicated course recently with a liver infection and bacteremia with Streptococcus intermedius infection.  His symptoms began right around Sushma with some fevers and chills that were described being consistent with rigors.  After a trip to the urgent care and some lab work, he was sent to the emergency department for further evaluation.  While there, he was noted that he had bacteremia in the liver abscess.  The liver abscess was drained, and he was started on IV antibiotics.  A drain was left in upon discharge, and was removed after follow-up CT scan demonstrated resolution of the abscess, about 3 weeks ago.  He continued on oral antibiotics until about 10 days ago when he discontinued.  He is actually feeling quite well now, and feels for the most part back to his usual self.  He has had no fevers chills or abdominal pain.  Of note he had a recent white blood cell level that was still elevated at 11.7, that was done on January 29, 2025.  Plan:  He seems to be " recovering from this, but I would like a repeat CBC to ensure his white blood cell count is coming back to normal.  He should continue to monitor for any recurrence of abdominal pain, fevers or chills.  - CBC WITHOUT DIFFERENTIAL; Future    3. 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  Lymph node resection led to the diagnosis of non-Hodgkin's lymphoma.  He has not had any treatment, as has been described as very early and low level, and is only being followed every 6 months by oncology.  He is next due to see oncology in June 2025.  Plan:  Currently this is stable and well controlled.  The patient should continue current therapy with no changes at this time.        4. Dyslipidemia  Jet is tolerating medication well.  No intolerable side-effects noted.  No new symptoms of muscle aches or weakness.  Patient reports good adherence to medication regimen.  No change in medication since the last visit. Jet is trying to follow a low-cholesterol diet.  Exercise is adequate.   His last LDL was good, and he is only on atorvastatin 10 mg daily.  He does have significant family history of early coronary disease.  Plan:  For now continue atorvastatin at its current dose.  Repeat lipid profile before his next visit.  We discussed  Coronary artery calcium scan, and he is quite interested in proceeding with this and I think this is reasonable.  That said, he has had 5 CT scans since December, and had like him to hold off on another CT scan for at least 6 months.  Will continue to discuss this at future visits.  - Comp Metabolic Panel; Future  - Lipid Profile; Future    5. Environmental allergies  Overall he is doing well on his current allergy regimen which includes nasal spray as well as montelukast.  Plan:  Currently this is stable and well controlled.  The patient should continue  current therapy with no changes at this time.        6. Right hip pain  He has history of left total hip arthroplasty, and has had some increasing pain in the right hip that he has been seeing physical therapy for.  At some point he may need hip replacement on the right as well, and I will continue to follow with orthopedics for this.    7. Oral herpes  Stable with minimal outbreaks on daily acyclovir.     Orders Placed This Encounter    CBC WITHOUT DIFFERENTIAL    Comp Metabolic Panel    Lipid Profile     Return in about 2 months (around 4/13/2025).    Shar Rahman M.D.  Professor of Medicine  [facility]    This note was created using voice recognition software.  While every attempt is made to ensure accuracy of transcription, occasionally errors occur.

## 2025-02-14 ENCOUNTER — PHYSICAL THERAPY (OUTPATIENT)
Dept: PHYSICAL THERAPY | Facility: REHABILITATION | Age: 70
End: 2025-02-14
Payer: MEDICARE

## 2025-02-14 DIAGNOSIS — M25.551 RIGHT HIP PAIN: ICD-10-CM

## 2025-02-14 PROCEDURE — 97110 THERAPEUTIC EXERCISES: CPT

## 2025-02-14 NOTE — OP THERAPY DISCHARGE SUMMARY
Outpatient Physical Therapy  DISCHARGE SUMMARY NOTE      [facility] [place] [address] [address]  [address]  [facility] [address]  Phone:  [phone]  Fax:  [phone]    Date of Visit: 02/14/2025    Patient: Jet Webb  YOB: 1955  MRN: [id number]     Referring Provider: Sukumar Bunn D.O.  [address] [place] [address]   Referring Diagnosis Pain in right hip [M25.551]         Functional Assessment Used  womac3%  disability        Your patient is being discharged from Physical Therapy with the following comments:   Goals partially met  Pt. was hospitalized due to liver abscess and was feeling pretty good prior to and after hospitalization--overall, his hip is feeling pretty good and wanted to come in today to review hep   Patient is independent with his HEP.  Discharging patient to an independent HEP.    Marcial Camacho, PT, DPT, OCS    Date: 2/14/2025

## 2025-02-14 NOTE — OP THERAPY DAILY TREATMENT
"  Outpatient Physical Therapy  DAILY TREATMENT     [facility] [address].  [address]  [place] [address]  Phone:  [phone]  Fax:  [phone]    Date: 02/14/2025    Patient: Jet Webb  YOB: 1955  MRN: [id number]     Time Calculation    Start time: 0802  Stop time: 0840 Time Calculation (min): 38 minutes         Chief Complaint: No chief complaint on file.    Visit #: 6    SUBJECTIVE:was hospitalized  due to liver abscess and was feeling pretty good prior to and after hospitalization--overall, the hip is feeling pretty good and wanted to come in today to review hep  OBJECTIVE:            Therapeutic Treatments and Modalities:     Therapeutic Treatment and Modalities Summary: Reviewed and discussed presence of spondylolisthesis and improtance of posture, core stab hip function  Quadruped rocking--focus on hip dissociation x 20--hep  Superman   ART R g min/med  Bridge marching x 1'  Running kevin x 1'// focussed on end range hip ext.  DN: Patient signed informed written release and verbally agreed with informed consent to procedure of dry needling   skin prep with isopropyl  Alcohol/Chlora prep  R tFL, r g min/med  -TENS w/ FDN    -No adverse reactions observed post treatment   HEP focus: 4 ex:  Superman --  Running man --bilateral > 1'    Toy soldier #2--hep  Bridge marching--limited psot chain  strength          Time-based treatments/modalities:    Physical Therapy Timed Treatment Charges  Therapeutic exercise minutes (CPT 23467): 38 minutes      Pain rating (1-10) before treatment: no pain  Pain rating (1-10) after treatment:  0\" tired buttock but no pain\"    ASSESSMENT:   All gaols met but still presents with psot chain weakness but improving    PLAN/RECOMMENDATIONS:   D/c to an independent HEP         "

## 2025-02-15 ENCOUNTER — HOSPITAL ENCOUNTER (OUTPATIENT)
Dept: LAB | Facility: MEDICAL CENTER | Age: 70
End: 2025-02-15
Attending: INTERNAL MEDICINE
Payer: MEDICARE

## 2025-02-15 DIAGNOSIS — E78.5 DYSLIPIDEMIA: ICD-10-CM

## 2025-02-15 DIAGNOSIS — K75.0 LIVER ABSCESS: ICD-10-CM

## 2025-02-15 DIAGNOSIS — R78.81 BACTEREMIA: ICD-10-CM

## 2025-02-15 LAB
ALBUMIN SERPL BCP-MCNC: 3.9 G/DL (ref 3.2–4.9)
ALBUMIN/GLOB SERPL: 1.3 G/DL
ALP SERPL-CCNC: 91 U/L (ref 30–99)
ALT SERPL-CCNC: 16 U/L (ref 2–50)
ANION GAP SERPL CALC-SCNC: 11 MMOL/L (ref 7–16)
AST SERPL-CCNC: 18 U/L (ref 12–45)
BILIRUB SERPL-MCNC: 0.9 MG/DL (ref 0.1–1.5)
BUN SERPL-MCNC: 16 MG/DL (ref 8–22)
CALCIUM ALBUM COR SERPL-MCNC: 9.6 MG/DL (ref 8.5–10.5)
CALCIUM SERPL-MCNC: 9.5 MG/DL (ref 8.5–10.5)
CHLORIDE SERPL-SCNC: 104 MMOL/L (ref 96–112)
CHOLEST SERPL-MCNC: 84 MG/DL (ref 100–199)
CO2 SERPL-SCNC: 25 MMOL/L (ref 20–33)
CREAT SERPL-MCNC: 0.91 MG/DL (ref 0.5–1.4)
ERYTHROCYTE [DISTWIDTH] IN BLOOD BY AUTOMATED COUNT: 43.4 FL (ref 35.9–50)
GFR SERPLBLD CREATININE-BSD FMLA CKD-EPI: 90 ML/MIN/1.73 M 2
GLOBULIN SER CALC-MCNC: 3.1 G/DL (ref 1.9–3.5)
GLUCOSE SERPL-MCNC: 89 MG/DL (ref 65–99)
HCT VFR BLD AUTO: 39.9 % (ref 42–52)
HDLC SERPL-MCNC: 43 MG/DL
HGB BLD-MCNC: 12.8 G/DL (ref 14–18)
LDLC SERPL CALC-MCNC: 31 MG/DL
MCH RBC QN AUTO: 30.5 PG (ref 27–33)
MCHC RBC AUTO-ENTMCNC: 32.1 G/DL (ref 32.3–36.5)
MCV RBC AUTO: 95 FL (ref 81.4–97.8)
PLATELET # BLD AUTO: 269 K/UL (ref 164–446)
PMV BLD AUTO: 8.7 FL (ref 9–12.9)
POTASSIUM SERPL-SCNC: 4.5 MMOL/L (ref 3.6–5.5)
PROT SERPL-MCNC: 7 G/DL (ref 6–8.2)
RBC # BLD AUTO: 4.2 M/UL (ref 4.7–6.1)
SODIUM SERPL-SCNC: 140 MMOL/L (ref 135–145)
TRIGL SERPL-MCNC: 52 MG/DL (ref 0–149)
WBC # BLD AUTO: 10.1 K/UL (ref 4.8–10.8)

## 2025-02-15 PROCEDURE — 80053 COMPREHEN METABOLIC PANEL: CPT

## 2025-02-15 PROCEDURE — 80061 LIPID PANEL: CPT

## 2025-02-15 PROCEDURE — 85027 COMPLETE CBC AUTOMATED: CPT

## 2025-02-15 PROCEDURE — 36415 COLL VENOUS BLD VENIPUNCTURE: CPT

## 2025-02-17 ENCOUNTER — APPOINTMENT (OUTPATIENT)
Dept: PHYSICAL THERAPY | Facility: REHABILITATION | Age: 70
End: 2025-02-17
Payer: MEDICARE

## 2025-02-20 ENCOUNTER — APPOINTMENT (OUTPATIENT)
Dept: PHYSICAL THERAPY | Facility: REHABILITATION | Age: 70
End: 2025-02-20
Payer: MEDICARE

## 2025-02-24 ENCOUNTER — APPOINTMENT (OUTPATIENT)
Dept: PHYSICAL THERAPY | Facility: REHABILITATION | Age: 70
End: 2025-02-24
Payer: MEDICARE

## 2025-02-27 ENCOUNTER — APPOINTMENT (OUTPATIENT)
Dept: PHYSICAL THERAPY | Facility: REHABILITATION | Age: 70
End: 2025-02-27
Payer: MEDICARE

## 2025-03-03 ENCOUNTER — APPOINTMENT (OUTPATIENT)
Dept: PHYSICAL THERAPY | Facility: REHABILITATION | Age: 70
End: 2025-03-03
Payer: MEDICARE

## 2025-03-06 ENCOUNTER — APPOINTMENT (OUTPATIENT)
Dept: PHYSICAL THERAPY | Facility: REHABILITATION | Age: 70
End: 2025-03-06
Payer: MEDICARE

## 2025-03-10 ENCOUNTER — APPOINTMENT (OUTPATIENT)
Dept: PHYSICAL THERAPY | Facility: REHABILITATION | Age: 70
End: 2025-03-10
Payer: MEDICARE

## 2025-03-13 ENCOUNTER — APPOINTMENT (OUTPATIENT)
Dept: PHYSICAL THERAPY | Facility: REHABILITATION | Age: 70
End: 2025-03-13
Payer: MEDICARE

## 2025-03-17 ENCOUNTER — APPOINTMENT (OUTPATIENT)
Dept: PHYSICAL THERAPY | Facility: REHABILITATION | Age: 70
End: 2025-03-17
Payer: MEDICARE

## 2025-03-19 ENCOUNTER — TELEPHONE (OUTPATIENT)
Dept: HEALTH INFORMATION MANAGEMENT | Facility: OTHER | Age: 70
End: 2025-03-19
Payer: MEDICARE

## 2025-03-20 ENCOUNTER — APPOINTMENT (OUTPATIENT)
Dept: PHYSICAL THERAPY | Facility: REHABILITATION | Age: 70
End: 2025-03-20
Payer: MEDICARE

## 2025-04-22 ENCOUNTER — OFFICE VISIT (OUTPATIENT)
Dept: INTERNAL MEDICINE | Facility: OTHER | Age: 70
End: 2025-04-22
Payer: MEDICARE

## 2025-04-22 VITALS
HEIGHT: 68 IN | WEIGHT: 158.2 LBS | DIASTOLIC BLOOD PRESSURE: 65 MMHG | TEMPERATURE: 98.8 F | OXYGEN SATURATION: 97 % | BODY MASS INDEX: 23.98 KG/M2 | HEART RATE: 60 BPM | SYSTOLIC BLOOD PRESSURE: 122 MMHG

## 2025-04-22 DIAGNOSIS — Z91.09 ENVIRONMENTAL ALLERGIES: ICD-10-CM

## 2025-04-22 DIAGNOSIS — C82.01 GRADE 1 FOLLICULAR LYMPHOMA OF LYMPH NODES OF NECK (HCC): ICD-10-CM

## 2025-04-22 DIAGNOSIS — J45.20 MILD INTERMITTENT REACTIVE AIRWAY DISEASE WITHOUT COMPLICATION: ICD-10-CM

## 2025-04-22 DIAGNOSIS — E78.5 DYSLIPIDEMIA: ICD-10-CM

## 2025-04-22 PROBLEM — J45.909 REACTIVE AIRWAY DISEASE WITHOUT COMPLICATION: Status: ACTIVE | Noted: 2025-04-22

## 2025-04-22 ASSESSMENT — FIBROSIS 4 INDEX: FIB4 SCORE: 1.19

## 2025-04-22 NOTE — PROGRESS NOTES
Established Patient    Patient Care Team:  Shar Rahman M.D. as PCP - General (Internal Medicine)  Erik Chen D.O. (Hematology & Oncology)    Jet Webb is a 69 y.o. 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    ROS:     Denies any new chest pain or shortness of breath.  No changes to urinary or bowel function.   See HPI.    Past Medical History:   Diagnosis Date    Cancer (HCC)     Dyslipidemia 05/13/2024    Chronic, LDL 36  Hdl slightly low at 39, so encouraged exercise.  Current regimen: Lipitor 10 mg nightly      History of kidney stones 05/13/2024    -kidney stone s/p lithotripsy      Liver abscess 12/28/2024    Lymphoma (HCC)     Mild aortic valve sclerosis 05/16/2024    Echocardiogram May 2024 showed.      Oral herpes 05/13/2024    On acyclovir everyday.       Social History     Tobacco Use    Smoking status: Never    Smokeless tobacco: Never   Vaping Use    Vaping status: Never Used   Substance Use Topics    Alcohol use: Yes     Comment: ocassional    Drug use: Never     Current Outpatient Medications   Medication Sig Dispense Refill    ibuprofen (MOTRIN) 200 MG Tab Take 600 mg by mouth every 6 hours as needed for Mild Pain or Fever. 200 mg x 3 tablets = 600 mg      acetaminophen (TYLENOL) 325 MG Tab Take 650 mg by mouth every four hours as needed for Mild Pain or Fever. 325 mg x 2 tablets = 500 mg      acyclovir (ZOVIRAX) 800 MG Tab Take 1 Tablet by mouth 2 times a day. (Patient taking differently: Take 800 mg by mouth every evening.) 200 Tablet 2    atorvastatin (LIPITOR) 10 MG Tab Take 1 Tablet by mouth every evening. 100 Tablet 2    Azelastine (ASTELIN) 137  "MCG/SPRAY Solution SPRAY TWO PUFFS INTO EACH NOSTRIL TWICE A DAY AS DIRECTED 90 mL 2    montelukast (SINGULAIR) 10 MG Tab Take 1 Tablet by mouth every day. 100 Tablet 2     No current facility-administered medications for this visit.       Physical Exam:  /65 (BP Location: Left arm, Patient Position: Sitting, BP Cuff Size: Adult)   Pulse 60   Temp 37.1 °C (98.8 °F) (Temporal)   Ht 1.727 m (5' 8\")   Wt 71.8 kg (158 lb 3.2 oz)   SpO2 97%   BMI 24.05 kg/m²   General: Well developed, well nourished male, in no distress.  Eyes: Conjuntiva without any obvious injection or erythema.   Cardiovascular: Heart is regular with no murmur  Lungs: Clear to auscultation bilaterally. No wheezes, rhonci or crackles heard. Respiratory effort is normal.  Abd: Soft, non-tender  Ext: No edema    HPI / Assessment / Plan:  1. Dyslipidemia  Jet is tolerating medication well.  No intolerable side-effects noted.  No new symptoms of muscle aches or weakness.  Patient reports good adherence to medication regimen.  No change in medication since the last visit. Jet is trying to follow a low-cholesterol diet.  Exercise is adequate.  Plan:  Overall doing well on the current regimen.  No changes needed today. Will repeat the lipid panel in one year.    2. 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  He has a follow up appointment with Dr. Chen in June and will plan to have labs just prior.  His las CBC showed a persistent, but mild anemia.  Plan:  Currently this is stable and well controlled.  The patient should continue current therapy with no changes at this time.       3. Environmental allergies  4. Mild intermittent reactive airway disease without complication  He continues to take the Singulair and the Astelin nasal spray which completley controls his symptoms.  In the past he had some mild nocturnal wheezing 10+ years ago, but that was during a period when he was " taking care of a cat (to which he was likely allergic).  Plan:  Continue current management.    No orders of the defined types were placed in this encounter.      No follow-ups on file.    Shar Rahman M.D.  Professor of Medicine  [facility]    This note was created using voice recognition software.  While every attempt is made to ensure accuracy of transcription, occasionally errors occur.

## 2025-04-23 ENCOUNTER — PATIENT MESSAGE (OUTPATIENT)
Dept: INTERNAL MEDICINE | Facility: OTHER | Age: 70
End: 2025-04-23
Payer: MEDICARE

## 2025-04-23 DIAGNOSIS — Z12.5 PROSTATE CANCER SCREENING: ICD-10-CM

## 2025-05-12 ENCOUNTER — PATIENT MESSAGE (OUTPATIENT)
Dept: INTERNAL MEDICINE | Facility: OTHER | Age: 70
End: 2025-05-12
Payer: MEDICARE

## 2025-05-12 DIAGNOSIS — G47.25 SLEEP-WAKE SCHEDULE DISORDER, JET LAG TYPE: Primary | ICD-10-CM

## 2025-05-12 RX ORDER — ZOLPIDEM TARTRATE 10 MG/1
TABLET ORAL
Qty: 30 TABLET | Status: CANCELLED
Start: 2025-05-12

## 2025-05-12 NOTE — TELEPHONE ENCOUNTER
Can we please get some more details on this prescription?  It is not on his med list and I see no refills in the PDMP system for controlled substances.  I suspect it is only used as needed and sparingly, but knowing how often he takes it will be helpful.

## 2025-05-12 NOTE — PATIENT COMMUNICATION
Received request via: Patient    Was the patient seen in the last year in this department? Yes    Does the patient have an active prescription (recently filled or refills available) for medication(s) requested? No    Pharmacy Name: [facility]     Does the patient have snf Plus and need 100-day supply? (This applies to ALL medications) Yes, patient has SCP, but unsure if zolpidem is appropriate for 100 day supply.

## 2025-05-19 ENCOUNTER — PATIENT MESSAGE (OUTPATIENT)
Dept: INTERNAL MEDICINE | Facility: OTHER | Age: 70
End: 2025-05-19
Payer: MEDICARE

## 2025-05-19 RX ORDER — ZOLPIDEM TARTRATE 10 MG/1
10 TABLET ORAL NIGHTLY PRN
Qty: 20 TABLET | Refills: 0 | Status: SHIPPED | OUTPATIENT
Start: 2025-05-19 | End: 2025-06-08

## 2025-05-19 NOTE — PROGRESS NOTES
Jet Leonson Jon is well known to me and I have reviewed the chart, including the medication list and history of prescription requests.    PDMP reviewed today.  Controlled Substance Agreement status date: N/A - single, non recurring Rx for travel  UDS Status: N/A    Requested Prescriptions     Signed Prescriptions Disp Refills    zolpidem (AMBIEN) 10 MG Tab 20 Tablet 0     Sig: Take 1 Tablet by mouth at bedtime as needed for Sleep for up to 20 days.     Authorizing Provider: SHAR RAHMAN       Next appointment with me is Visit date not found    Shar Rahman M.D.

## 2025-05-21 ENCOUNTER — HOSPITAL ENCOUNTER (OUTPATIENT)
Dept: LAB | Facility: MEDICAL CENTER | Age: 70
End: 2025-05-21
Attending: INTERNAL MEDICINE
Payer: MEDICARE

## 2025-05-21 ENCOUNTER — HOSPITAL ENCOUNTER (OUTPATIENT)
Dept: LAB | Facility: MEDICAL CENTER | Age: 70
End: 2025-05-21
Attending: STUDENT IN AN ORGANIZED HEALTH CARE EDUCATION/TRAINING PROGRAM
Payer: MEDICARE

## 2025-05-21 DIAGNOSIS — Z12.5 PROSTATE CANCER SCREENING: ICD-10-CM

## 2025-05-21 DIAGNOSIS — C82.01 GRADE 1 FOLLICULAR LYMPHOMA OF LYMPH NODES OF NECK (HCC): ICD-10-CM

## 2025-05-21 LAB
BASOPHILS # BLD AUTO: 0.9 % (ref 0–1.8)
BASOPHILS # BLD: 0.08 K/UL (ref 0–0.12)
EOSINOPHIL # BLD AUTO: 0.15 K/UL (ref 0–0.51)
EOSINOPHIL NFR BLD: 1.6 % (ref 0–6.9)
ERYTHROCYTE [DISTWIDTH] IN BLOOD BY AUTOMATED COUNT: 41.1 FL (ref 35.9–50)
HCT VFR BLD AUTO: 41 % (ref 42–52)
HGB BLD-MCNC: 13.5 G/DL (ref 14–18)
IMM GRANULOCYTES # BLD AUTO: 0.05 K/UL (ref 0–0.11)
IMM GRANULOCYTES NFR BLD AUTO: 0.5 % (ref 0–0.9)
LYMPHOCYTES # BLD AUTO: 3.88 K/UL (ref 1–4.8)
LYMPHOCYTES NFR BLD: 42.3 % (ref 22–41)
MCH RBC QN AUTO: 30.6 PG (ref 27–33)
MCHC RBC AUTO-ENTMCNC: 32.9 G/DL (ref 32.3–36.5)
MCV RBC AUTO: 93 FL (ref 81.4–97.8)
MONOCYTES # BLD AUTO: 1.17 K/UL (ref 0–0.85)
MONOCYTES NFR BLD AUTO: 12.7 % (ref 0–13.4)
NEUTROPHILS # BLD AUTO: 3.85 K/UL (ref 1.82–7.42)
NEUTROPHILS NFR BLD: 42 % (ref 44–72)
NRBC # BLD AUTO: 0 K/UL
NRBC BLD-RTO: 0 /100 WBC (ref 0–0.2)
PLATELET # BLD AUTO: 274 K/UL (ref 164–446)
PMV BLD AUTO: 8.5 FL (ref 9–12.9)
RBC # BLD AUTO: 4.41 M/UL (ref 4.7–6.1)
WBC # BLD AUTO: 9.2 K/UL (ref 4.8–10.8)

## 2025-05-21 PROCEDURE — 84153 ASSAY OF PSA TOTAL: CPT

## 2025-05-21 PROCEDURE — 36415 COLL VENOUS BLD VENIPUNCTURE: CPT

## 2025-05-21 PROCEDURE — 83615 LACTATE (LD) (LDH) ENZYME: CPT

## 2025-05-21 PROCEDURE — 85025 COMPLETE CBC W/AUTO DIFF WBC: CPT

## 2025-05-21 PROCEDURE — 80053 COMPREHEN METABOLIC PANEL: CPT

## 2025-05-22 ENCOUNTER — OFFICE VISIT (OUTPATIENT)
Dept: DERMATOLOGY | Facility: IMAGING CENTER | Age: 70
End: 2025-05-22
Payer: MEDICARE

## 2025-05-22 DIAGNOSIS — L82.1 SEBORRHEIC KERATOSIS: ICD-10-CM

## 2025-05-22 DIAGNOSIS — D22.9 NEVUS: ICD-10-CM

## 2025-05-22 DIAGNOSIS — L90.8 SKIN AGING: ICD-10-CM

## 2025-05-22 DIAGNOSIS — L82.0 INFLAMED SEBORRHEIC KERATOSIS: ICD-10-CM

## 2025-05-22 DIAGNOSIS — L81.4 LENTIGO: ICD-10-CM

## 2025-05-22 DIAGNOSIS — Z12.83 SKIN CANCER SCREENING: Primary | ICD-10-CM

## 2025-05-22 LAB
ALBUMIN SERPL BCP-MCNC: 3.9 G/DL (ref 3.2–4.9)
ALBUMIN/GLOB SERPL: 1.3 G/DL
ALP SERPL-CCNC: 88 U/L (ref 30–99)
ALT SERPL-CCNC: 16 U/L (ref 2–50)
ANION GAP SERPL CALC-SCNC: 10 MMOL/L (ref 7–16)
AST SERPL-CCNC: 22 U/L (ref 12–45)
BILIRUB SERPL-MCNC: 0.6 MG/DL (ref 0.1–1.5)
BUN SERPL-MCNC: 19 MG/DL (ref 8–22)
CALCIUM ALBUM COR SERPL-MCNC: 9.2 MG/DL (ref 8.5–10.5)
CALCIUM SERPL-MCNC: 9.1 MG/DL (ref 8.5–10.5)
CHLORIDE SERPL-SCNC: 108 MMOL/L (ref 96–112)
CO2 SERPL-SCNC: 24 MMOL/L (ref 20–33)
CREAT SERPL-MCNC: 0.88 MG/DL (ref 0.5–1.4)
GFR SERPLBLD CREATININE-BSD FMLA CKD-EPI: 92 ML/MIN/1.73 M 2
GLOBULIN SER CALC-MCNC: 3.1 G/DL (ref 1.9–3.5)
GLUCOSE SERPL-MCNC: 97 MG/DL (ref 65–99)
LDH SERPL L TO P-CCNC: 165 U/L (ref 107–266)
POTASSIUM SERPL-SCNC: 4.6 MMOL/L (ref 3.6–5.5)
PROT SERPL-MCNC: 7 G/DL (ref 6–8.2)
PSA SERPL DL<=0.01 NG/ML-MCNC: 1.87 NG/ML (ref 0–4)
SODIUM SERPL-SCNC: 142 MMOL/L (ref 135–145)

## 2025-05-22 PROCEDURE — 17110 DESTRUCTION B9 LES UP TO 14: CPT | Performed by: DERMATOLOGY

## 2025-05-22 PROCEDURE — 99213 OFFICE O/P EST LOW 20 MIN: CPT | Mod: 25 | Performed by: DERMATOLOGY

## 2025-05-22 NOTE — PROGRESS NOTES
"CC: Annual Exam     Subjective: Prev seen patient here for ASHLEY.    Denies sites I/B/C/B  Itchy rash on lower shins     History of skin cancer: No  History of precancers/actinic keratoses: Yes, Details: rt ear. Put some\"ointment on it for a while\" ( efudex maybe)   History of biopsies:Yes, Details: benign   History of blistering/severe sunburns:No  Family history of skin cancer:No  Family history of atypical moles:No    ROS: no fevers/chills. No itch.  No cough  Relevant PMH:NHL  Social: NS    PE: Gen:WDWN male in NAD. Skin: Scalp/face/eyes/lips/neck/chest/back/arms/legs/hands/feet/buttocks - without suspicious lesions noted.  Genitals exam declined  -few thin waxy papules on right arm, chest, appearing mildly irritated  -scattered hyperpigmented macules/papules, appearing benign on torso and extremities  -xerosis lower extremities    A/P: Nevi: benign appearing:  -Reviewed skin cancer detection/prevention  -RTC PRN growth/changes/concerning features    Lentigos/SKs: benign  -reassurance  -reviewed skin cancer detection/prevention    Asteatosis: legs:  -advised moisturizer use/cortaid-steroid cream  -can message MD if needs Rx supply to treat    ISK:    -LN2 25 sec X 2 cycles  X2 lesions  -f/u if persists prn    I have reviewed medications relevant to my specialty.          "

## 2025-06-12 ENCOUNTER — HOSPITAL ENCOUNTER (OUTPATIENT)
Dept: HEMATOLOGY ONCOLOGY | Facility: MEDICAL CENTER | Age: 70
End: 2025-06-12
Attending: STUDENT IN AN ORGANIZED HEALTH CARE EDUCATION/TRAINING PROGRAM
Payer: MEDICARE

## 2025-06-12 VITALS
OXYGEN SATURATION: 97 % | TEMPERATURE: 98.3 F | BODY MASS INDEX: 24.4 KG/M2 | HEIGHT: 68 IN | WEIGHT: 161 LBS | HEART RATE: 68 BPM | SYSTOLIC BLOOD PRESSURE: 132 MMHG | DIASTOLIC BLOOD PRESSURE: 68 MMHG

## 2025-06-12 DIAGNOSIS — C82.01 GRADE 1 FOLLICULAR LYMPHOMA OF LYMPH NODES OF NECK (HCC): Primary | ICD-10-CM

## 2025-06-12 PROCEDURE — 99213 OFFICE O/P EST LOW 20 MIN: CPT | Performed by: STUDENT IN AN ORGANIZED HEALTH CARE EDUCATION/TRAINING PROGRAM

## 2025-06-12 PROCEDURE — 99212 OFFICE O/P EST SF 10 MIN: CPT | Performed by: STUDENT IN AN ORGANIZED HEALTH CARE EDUCATION/TRAINING PROGRAM

## 2025-06-12 ASSESSMENT — ENCOUNTER SYMPTOMS
SINUS PAIN: 0
CHILLS: 0
VOMITING: 0
SHORTNESS OF BREATH: 0
DIZZINESS: 0
ABDOMINAL PAIN: 0
NERVOUS/ANXIOUS: 0
BACK PAIN: 0
DOUBLE VISION: 0
TINGLING: 0
NAUSEA: 0
DIAPHORESIS: 0
MYALGIAS: 0
HEADACHES: 0
CONSTIPATION: 0
ORTHOPNEA: 0
SORE THROAT: 0
WEIGHT LOSS: 0
COUGH: 0
SPUTUM PRODUCTION: 0
DIARRHEA: 0
WHEEZING: 0
WEAKNESS: 0
FEVER: 0
BLURRED VISION: 0
BLOOD IN STOOL: 0
HEARTBURN: 0
PALPITATIONS: 0
INSOMNIA: 0
BRUISES/BLEEDS EASILY: 0

## 2025-06-12 ASSESSMENT — FIBROSIS 4 INDEX: FIB4 SCORE: 1.43

## 2025-06-12 NOTE — PROGRESS NOTES
Follow Up Note:  Hematology/Oncology      Primary Care:  Sukumar Bunn D.O.    Diagnosis: Follicular lymphoma    Chief Complaint: Surveillance visit    Current Treatment: NA    Prior Treatment: NA    Oncology History of Presenting Illness:  Jet Webb is a 68 y.o.  man who presents to the clinic for transfer of care for ongoing management for follicular lymphoma.  The patient was first diagnosed 5 and half years ago when he noticed a lymph node in his supraclavicular area on the right side, which has been enlarged for some time.  This was biopsied and found to be follicular lymphoma and he had continued follow-up with oncology in [place].  The patient never had any problems with B symptoms or endorgan damage, and was maintained on surveillance.  He has done well during this period of time and has recently retired and moved from [place] to [place].     Treatment History: NA    Interval History:  Patient is here for follow up visit. He feels well overall. He was hospitalized in January for a liver abscess from a strep bacteremia and was in the hospital for 12 days. He has recovered well from this and has no other issues.     Allergies as of 06/12/2025    (No Known Allergies)         Current Outpatient Medications:     ibuprofen (MOTRIN) 200 MG Tab, Take 600 mg by mouth every 6 hours as needed for Mild Pain or Fever. 200 mg x 3 tablets = 600 mg, Disp: , Rfl:     acetaminophen (TYLENOL) 325 MG Tab, Take 650 mg by mouth every four hours as needed for Mild Pain or Fever. 325 mg x 2 tablets = 500 mg, Disp: , Rfl:     acyclovir (ZOVIRAX) 800 MG Tab, Take 1 Tablet by mouth 2 times a day. (Patient taking differently: Take 800 mg by mouth every evening.), Disp: 200 Tablet, Rfl: 2    atorvastatin (LIPITOR) 10 MG Tab, Take 1 Tablet by mouth every evening., Disp: 100 Tablet, Rfl: 2    Azelastine (ASTELIN) 137 MCG/SPRAY Solution, SPRAY TWO PUFFS INTO EACH NOSTRIL TWICE A DAY AS DIRECTED, Disp: 90 mL, Rfl: 2     "montelukast (SINGULAIR) 10 MG Tab, Take 1 Tablet by mouth every day., Disp: 100 Tablet, Rfl: 2      Review of Systems:  Review of Systems   Constitutional:  Negative for chills, diaphoresis, fever, malaise/fatigue and weight loss.   HENT:  Negative for hearing loss, nosebleeds, sinus pain and sore throat.    Eyes:  Negative for blurred vision and double vision.   Respiratory:  Negative for cough, sputum production, shortness of breath and wheezing.    Cardiovascular:  Negative for chest pain, palpitations, orthopnea and leg swelling.   Gastrointestinal:  Negative for abdominal pain, blood in stool, constipation, diarrhea, heartburn, melena, nausea and vomiting.   Genitourinary:  Negative for dysuria, frequency, hematuria and urgency.   Musculoskeletal:  Negative for back pain, joint pain and myalgias.   Skin:  Negative for rash.   Neurological:  Negative for dizziness, tingling, weakness and headaches.   Endo/Heme/Allergies:  Does not bruise/bleed easily.   Psychiatric/Behavioral:  The patient is not nervous/anxious and does not have insomnia.          Physical Exam:  Vitals:    06/12/25 0848   BP: 132/68   Pulse: 68   Temp: 36.8 °C (98.3 °F)   TempSrc: Temporal   SpO2: 97%   Weight: 73 kg (161 lb)   Height: 1.727 m (5' 7.99\")       DESC; KARNOFSKY SCALE WITH ECOG EQUIVALENT: 100, Fully active, able to carry on all pre-disease performed without restriction (ECOG equivalent 0)    DISTRESS LEVEL: no apparent distress    Physical Exam  Vitals and nursing note reviewed.   Constitutional:       General: He is awake. He is not in acute distress.     Appearance: Normal appearance. He is normal weight. He is not ill-appearing, toxic-appearing or diaphoretic.   HENT:      Head: Normocephalic and atraumatic.      Nose: Nose normal. No congestion.      Mouth/Throat:      Pharynx: Oropharynx is clear. No oropharyngeal exudate or posterior oropharyngeal erythema.   Eyes:      General: No scleral icterus.     Extraocular " Movements: Extraocular movements intact.      Conjunctiva/sclera: Conjunctivae normal.      Pupils: Pupils are equal, round, and reactive to light.   Cardiovascular:      Rate and Rhythm: Normal rate and regular rhythm.      Pulses: Normal pulses.      Heart sounds: Normal heart sounds. No murmur heard.     No friction rub. No gallop.   Pulmonary:      Effort: Pulmonary effort is normal.      Breath sounds: Normal breath sounds. No decreased air movement. No wheezing, rhonchi or rales.   Abdominal:      General: Bowel sounds are normal. There is no distension.      Tenderness: There is no abdominal tenderness.   Musculoskeletal:         General: No deformity. Normal range of motion.      Cervical back: Normal range of motion and neck supple. No tenderness.      Right lower leg: No edema.      Left lower leg: No edema.   Lymphadenopathy:      Cervical: Cervical adenopathy present.      Right cervical: Superficial cervical adenopathy present.      Left cervical: Superficial cervical adenopathy present.      Upper Body:      Right upper body: Supraclavicular adenopathy present. No axillary adenopathy.      Left upper body: No axillary adenopathy.      Lower Body: Right inguinal adenopathy present. No left inguinal adenopathy.   Skin:     General: Skin is warm and dry.      Coloration: Skin is not jaundiced.      Findings: No erythema or rash.   Neurological:      General: No focal deficit present.      Mental Status: He is alert and oriented to person, place, and time.      Sensory: Sensation is intact.      Motor: Motor function is intact. No weakness.      Gait: Gait is intact.   Psychiatric:         Attention and Perception: Attention normal.         Mood and Affect: Mood normal.         Behavior: Behavior normal. Behavior is cooperative.         Thought Content: Thought content normal.         Judgment: Judgment normal.           Labs:  No visits with results within 7 Day(s) from this visit.   Latest known visit  with results is:   Hospital Outpatient Visit on 05/21/2025   Component Date Value Ref Range Status    LDH Total 05/21/2025 165  107 - 266 U/L Final    Sodium 05/21/2025 142  135 - 145 mmol/L Final    Potassium 05/21/2025 4.6  3.6 - 5.5 mmol/L Final    Chloride 05/21/2025 108  96 - 112 mmol/L Final    Co2 05/21/2025 24  20 - 33 mmol/L Final    Anion Gap 05/21/2025 10.0  7.0 - 16.0 Final    Glucose 05/21/2025 97  65 - 99 mg/dL Final    Bun 05/21/2025 19  8 - 22 mg/dL Final    Creatinine 05/21/2025 0.88  0.50 - 1.40 mg/dL Final    Calcium 05/21/2025 9.1  8.5 - 10.5 mg/dL Final    Correct Calcium 05/21/2025 9.2  8.5 - 10.5 mg/dL Final    AST(SGOT) 05/21/2025 22  12 - 45 U/L Final    ALT(SGPT) 05/21/2025 16  2 - 50 U/L Final    Alkaline Phosphatase 05/21/2025 88  30 - 99 U/L Final    Total Bilirubin 05/21/2025 0.6  0.1 - 1.5 mg/dL Final    Albumin 05/21/2025 3.9  3.2 - 4.9 g/dL Final    Total Protein 05/21/2025 7.0  6.0 - 8.2 g/dL Final    Globulin 05/21/2025 3.1  1.9 - 3.5 g/dL Final    A-G Ratio 05/21/2025 1.3  g/dL Final    WBC 05/21/2025 9.2  4.8 - 10.8 K/uL Final    RBC 05/21/2025 4.41 (L)  4.70 - 6.10 M/uL Final    Hemoglobin 05/21/2025 13.5 (L)  14.0 - 18.0 g/dL Final    Hematocrit 05/21/2025 41.0 (L)  42.0 - 52.0 % Final    MCV 05/21/2025 93.0  81.4 - 97.8 fL Final    MCH 05/21/2025 30.6  27.0 - 33.0 pg Final    MCHC 05/21/2025 32.9  32.3 - 36.5 g/dL Final    RDW 05/21/2025 41.1  35.9 - 50.0 fL Final    Platelet Count 05/21/2025 274  164 - 446 K/uL Final    MPV 05/21/2025 8.5 (L)  9.0 - 12.9 fL Final    Neutrophils-Polys 05/21/2025 42.00 (L)  44.00 - 72.00 % Final    Lymphocytes 05/21/2025 42.30 (H)  22.00 - 41.00 % Final    Monocytes 05/21/2025 12.70  0.00 - 13.40 % Final    Eosinophils 05/21/2025 1.60  0.00 - 6.90 % Final    Basophils 05/21/2025 0.90  0.00 - 1.80 % Final    Immature Granulocytes 05/21/2025 0.50  0.00 - 0.90 % Final    Nucleated RBC 05/21/2025 0.00  0.00 - 0.20 /100 WBC Final    Neutrophils  (Absolute) 05/21/2025 3.85  1.82 - 7.42 K/uL Final    Includes immature neutrophils, if present.    Lymphs (Absolute) 05/21/2025 3.88  1.00 - 4.80 K/uL Final    Monos (Absolute) 05/21/2025 1.17 (H)  0.00 - 0.85 K/uL Final    Eos (Absolute) 05/21/2025 0.15  0.00 - 0.51 K/uL Final    Baso (Absolute) 05/21/2025 0.08  0.00 - 0.12 K/uL Final    Immature Granulocytes (abs) 05/21/2025 0.05  0.00 - 0.11 K/uL Final    NRBC (Absolute) 05/21/2025 0.00  K/uL Final    GFR (CKD-EPI) 05/21/2025 92  >60 mL/min/1.73 m 2 Final    Comment: Estimated Glomerular Filtration Rate is calculated using  race neutral CKD-EPI 2021 equation per NKF-ASN recommendations.         Imaging:     All listed images below have been independently reviewed by me. I agree with the findings as summarized below:    No results found.    Pathology:  Reported follicular lymphoma    Assessment & Plan:  1. 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 He has been on surveillance for many years and presents for transfer of care and management.      Current Diagnosis and Staging: Follicular lymphoma, stage II    Update: Patient is doing well at this time. He is asymptomatic at this time so we will continue with monitoring. Discussed single agent rituximab as well and the patient      Treatment Plan: Surveillance     Treatment Citation: NCCN     Plan of Care:     Primary Therapy: NA  Supportive Therapy: NA  Toxicity: NA  Labs: CBC with diff, CMP, LDH monitoring  Imaging: CT scans as clinically indicated  Treatment Planning: Patient has low grade indolent follicular lymphoma. Continue with surveillance with labs and exam q 6 months.   Consultations: NA  Code Status: Full  Miscellaneous: NA  Return for Follow Up: 6 months    Any questions and concerns raised by the patient were answered to the best of my ability. Thank you for allowing me to participate in the  care for this patient. Please feel free to contact me for any questions or concerns.

## 2025-06-12 NOTE — ADDENDUM NOTE
Encounter addended by: Emily Marshall, Med Ass't on: 6/12/2025 9:10 AM   Actions taken: Charge Capture section accepted

## 2025-06-18 ENCOUNTER — PATIENT MESSAGE (OUTPATIENT)
Dept: INTERNAL MEDICINE | Facility: OTHER | Age: 70
End: 2025-06-18
Payer: MEDICARE

## 2025-06-24 RX ORDER — SCOPOLAMINE 1 MG/3D
1 PATCH, EXTENDED RELEASE TRANSDERMAL
Qty: 4 PATCH | Refills: 3 | Status: SHIPPED | OUTPATIENT
Start: 2025-06-24

## 2025-07-09 ENCOUNTER — PATIENT MESSAGE (OUTPATIENT)
Dept: INTERNAL MEDICINE | Facility: OTHER | Age: 70
End: 2025-07-09
Payer: MEDICARE

## 2025-07-09 DIAGNOSIS — M25.551 RIGHT HIP PAIN: Primary | ICD-10-CM

## 2025-07-18 NOTE — Clinical Note
REFERRAL APPROVAL NOTICE         Sent on July 18, 2025                   Jet Webb  [address] [address]                   Dear Mr. Webb,    After a careful review of the medical information and benefit coverage, [facility] has processed your referral. See below for additional details.    If applicable, you must be actively enrolled with your insurance for coverage of the authorized service. If you have any questions regarding your coverage, please contact your insurance directly.    REFERRAL INFORMATION   Referral #:  [id number]  Referred-To Department    Referred-By Provider:  Orthopedics    Shar Rahman M.D.   [facility] (joint)      [address] [address]  [phone] [address] [address]  [phone]    Referral Start Date:  07/16/2025  Referral End Date:   07/16/2026             SCHEDULING  If you do not already have an appointment, please call [phone] to make an appointment.     MORE INFORMATION  If you do not already have a [facility] account, sign up at: [email or web address]  You can access your medical information, make appointments, see lab results, billing information, and more.  If you have questions regarding this referral, please contact  the [facility] Referrals department at:             [phone]. Monday - Friday 8:00AM - 5:00PM.     Sincerely,    [facility]     123

## 2025-08-16 DIAGNOSIS — Z91.09 ENVIRONMENTAL ALLERGIES: ICD-10-CM

## 2025-08-16 DIAGNOSIS — E78.5 DYSLIPIDEMIA: ICD-10-CM

## 2025-08-21 RX ORDER — ATORVASTATIN CALCIUM 10 MG/1
10 TABLET, FILM COATED ORAL EVERY EVENING
Qty: 100 TABLET | Refills: 2 | Status: SHIPPED | OUTPATIENT
Start: 2025-08-21

## 2025-08-21 RX ORDER — AZELASTINE HYDROCHLORIDE 137 UG/1
SPRAY, METERED NASAL
Qty: 90 ML | Refills: 2 | Status: SHIPPED | OUTPATIENT
Start: 2025-08-21

## 2025-08-21 RX ORDER — MONTELUKAST SODIUM 10 MG/1
10 TABLET ORAL DAILY
Qty: 100 TABLET | Refills: 2 | Status: SHIPPED | OUTPATIENT
Start: 2025-08-21